# Patient Record
Sex: MALE | Race: WHITE | NOT HISPANIC OR LATINO | Employment: OTHER | URBAN - NONMETROPOLITAN AREA
[De-identification: names, ages, dates, MRNs, and addresses within clinical notes are randomized per-mention and may not be internally consistent; named-entity substitution may affect disease eponyms.]

---

## 2018-06-07 ENCOUNTER — HOSPITAL ENCOUNTER (INPATIENT)
Facility: HOSPITAL | Age: 68
LOS: 13 days | Discharge: HOME | End: 2018-06-20
Attending: PSYCHIATRY & NEUROLOGY | Admitting: PSYCHIATRY & NEUROLOGY

## 2018-06-07 ENCOUNTER — APPOINTMENT (OUTPATIENT)
Dept: CT IMAGING | Facility: HOSPITAL | Age: 68
End: 2018-06-07

## 2018-06-07 ENCOUNTER — HOSPITAL ENCOUNTER (EMERGENCY)
Facility: HOSPITAL | Age: 68
Discharge: PSYCHIATRIC OR SUBSTANCE USE FACILITY | End: 2018-06-07
Attending: EMERGENCY MEDICINE | Admitting: EMERGENCY MEDICINE

## 2018-06-07 ENCOUNTER — APPOINTMENT (OUTPATIENT)
Dept: GENERAL RADIOLOGY | Facility: HOSPITAL | Age: 68
End: 2018-06-07

## 2018-06-07 VITALS
BODY MASS INDEX: 28.49 KG/M2 | RESPIRATION RATE: 20 BRPM | HEART RATE: 63 BPM | DIASTOLIC BLOOD PRESSURE: 90 MMHG | HEIGHT: 68 IN | OXYGEN SATURATION: 96 % | SYSTOLIC BLOOD PRESSURE: 150 MMHG | TEMPERATURE: 98.1 F | WEIGHT: 188 LBS

## 2018-06-07 DIAGNOSIS — F29 PSYCHOSIS, UNSPECIFIED PSYCHOSIS TYPE (HCC): Primary | ICD-10-CM

## 2018-06-07 DIAGNOSIS — F32.3 SEVERE SINGLE CURRENT EPISODE OF MAJOR DEPRESSIVE DISORDER, WITH PSYCHOTIC FEATURES (HCC): Primary | ICD-10-CM

## 2018-06-07 PROBLEM — F32.9 MDD (MAJOR DEPRESSIVE DISORDER): Status: ACTIVE | Noted: 2018-06-07

## 2018-06-07 LAB
6-ACETYL MORPHINE: NEGATIVE
ALBUMIN SERPL-MCNC: 4.6 G/DL (ref 3.4–4.8)
ALBUMIN/GLOB SERPL: 1.5 G/DL (ref 1.5–2.5)
ALP SERPL-CCNC: 64 U/L (ref 40–129)
ALT SERPL W P-5'-P-CCNC: 25 U/L (ref 10–44)
AMPHET+METHAMPHET UR QL: NEGATIVE
ANION GAP SERPL CALCULATED.3IONS-SCNC: 5.9 MMOL/L (ref 3.6–11.2)
AST SERPL-CCNC: 24 U/L (ref 10–34)
BARBITURATES UR QL SCN: NEGATIVE
BASOPHILS # BLD AUTO: 0.09 10*3/MM3 (ref 0–0.3)
BASOPHILS NFR BLD AUTO: 0.9 % (ref 0–2)
BENZODIAZ UR QL SCN: POSITIVE
BILIRUB SERPL-MCNC: 1.2 MG/DL (ref 0.2–1.8)
BILIRUB UR QL STRIP: NEGATIVE
BUN BLD-MCNC: 16 MG/DL (ref 7–21)
BUN/CREAT SERPL: 13.2 (ref 7–25)
BUPRENORPHINE SERPL-MCNC: NEGATIVE NG/ML
CALCIUM SPEC-SCNC: 9.8 MG/DL (ref 7.7–10)
CANNABINOIDS SERPL QL: NEGATIVE
CHLORIDE SERPL-SCNC: 104 MMOL/L (ref 99–112)
CLARITY UR: CLEAR
CO2 SERPL-SCNC: 26.1 MMOL/L (ref 24.3–31.9)
COCAINE UR QL: NEGATIVE
COLOR UR: YELLOW
CREAT BLD-MCNC: 1.21 MG/DL (ref 0.43–1.29)
DEPRECATED RDW RBC AUTO: 43.3 FL (ref 37–54)
EOSINOPHIL # BLD AUTO: 0.19 10*3/MM3 (ref 0–0.7)
EOSINOPHIL NFR BLD AUTO: 2 % (ref 0–7)
ERYTHROCYTE [DISTWIDTH] IN BLOOD BY AUTOMATED COUNT: 13.5 % (ref 11.5–14.5)
ETHANOL BLD-MCNC: <10 MG/DL
ETHANOL UR QL: <0.01 %
GFR SERPL CREATININE-BSD FRML MDRD: 60 ML/MIN/1.73
GLOBULIN UR ELPH-MCNC: 3.1 GM/DL
GLUCOSE BLD-MCNC: 111 MG/DL (ref 70–110)
GLUCOSE UR STRIP-MCNC: NEGATIVE MG/DL
HCT VFR BLD AUTO: 45.2 % (ref 42–52)
HGB BLD-MCNC: 16.4 G/DL (ref 14–18)
HGB UR QL STRIP.AUTO: NEGATIVE
IMM GRANULOCYTES # BLD: 0.02 10*3/MM3 (ref 0–0.03)
IMM GRANULOCYTES NFR BLD: 0.2 % (ref 0–0.5)
KETONES UR QL STRIP: NEGATIVE
LEUKOCYTE ESTERASE UR QL STRIP.AUTO: NEGATIVE
LYMPHOCYTES # BLD AUTO: 1.74 10*3/MM3 (ref 1–3)
LYMPHOCYTES NFR BLD AUTO: 18 % (ref 16–46)
MCH RBC QN AUTO: 32.3 PG (ref 27–33)
MCHC RBC AUTO-ENTMCNC: 36.3 G/DL (ref 33–37)
MCV RBC AUTO: 89 FL (ref 80–94)
METHADONE UR QL SCN: NEGATIVE
MONOCYTES # BLD AUTO: 1.13 10*3/MM3 (ref 0.1–0.9)
MONOCYTES NFR BLD AUTO: 11.7 % (ref 0–12)
NEUTROPHILS # BLD AUTO: 6.5 10*3/MM3 (ref 1.4–6.5)
NEUTROPHILS NFR BLD AUTO: 67.2 % (ref 40–75)
NITRITE UR QL STRIP: NEGATIVE
OPIATES UR QL: NEGATIVE
OSMOLALITY SERPL CALC.SUM OF ELEC: 273.8 MOSM/KG (ref 273–305)
OXYCODONE UR QL SCN: NEGATIVE
PCP UR QL SCN: NEGATIVE
PH UR STRIP.AUTO: 6 [PH] (ref 5–8)
PLATELET # BLD AUTO: 360 10*3/MM3 (ref 130–400)
PMV BLD AUTO: 9.3 FL (ref 6–10)
POTASSIUM BLD-SCNC: 3.8 MMOL/L (ref 3.5–5.3)
PROT SERPL-MCNC: 7.7 G/DL (ref 6–8)
PROT UR QL STRIP: NEGATIVE
RBC # BLD AUTO: 5.08 10*6/MM3 (ref 4.7–6.1)
SODIUM BLD-SCNC: 136 MMOL/L (ref 135–153)
SP GR UR STRIP: 1.02 (ref 1–1.03)
UROBILINOGEN UR QL STRIP: NORMAL
WBC NRBC COR # BLD: 9.67 10*3/MM3 (ref 4.5–12.5)

## 2018-06-07 PROCEDURE — 80307 DRUG TEST PRSMV CHEM ANLYZR: CPT | Performed by: PHYSICIAN ASSISTANT

## 2018-06-07 PROCEDURE — 85025 COMPLETE CBC W/AUTO DIFF WBC: CPT | Performed by: PHYSICIAN ASSISTANT

## 2018-06-07 PROCEDURE — 70450 CT HEAD/BRAIN W/O DYE: CPT | Performed by: RADIOLOGY

## 2018-06-07 PROCEDURE — 80053 COMPREHEN METABOLIC PANEL: CPT | Performed by: PHYSICIAN ASSISTANT

## 2018-06-07 PROCEDURE — 99284 EMERGENCY DEPT VISIT MOD MDM: CPT

## 2018-06-07 PROCEDURE — 74022 RADEX COMPL AQT ABD SERIES: CPT

## 2018-06-07 PROCEDURE — 93010 ELECTROCARDIOGRAM REPORT: CPT | Performed by: INTERNAL MEDICINE

## 2018-06-07 PROCEDURE — 81003 URINALYSIS AUTO W/O SCOPE: CPT | Performed by: PHYSICIAN ASSISTANT

## 2018-06-07 PROCEDURE — 93005 ELECTROCARDIOGRAM TRACING: CPT | Performed by: PSYCHIATRY & NEUROLOGY

## 2018-06-07 PROCEDURE — 74022 RADEX COMPL AQT ABD SERIES: CPT | Performed by: RADIOLOGY

## 2018-06-07 PROCEDURE — 70450 CT HEAD/BRAIN W/O DYE: CPT

## 2018-06-07 RX ORDER — PAROXETINE HYDROCHLORIDE 20 MG/1
20 TABLET, FILM COATED ORAL DAILY
COMMUNITY
End: 2018-06-20 | Stop reason: HOSPADM

## 2018-06-07 RX ORDER — CLONIDINE HYDROCHLORIDE 0.1 MG/1
0.1 TABLET ORAL DAILY PRN
Status: DISCONTINUED | OUTPATIENT
Start: 2018-06-07 | End: 2018-06-09

## 2018-06-07 RX ORDER — OLANZAPINE 5 MG/1
5 TABLET, ORALLY DISINTEGRATING ORAL EVERY 8 HOURS PRN
Status: DISCONTINUED | OUTPATIENT
Start: 2018-06-07 | End: 2018-06-20 | Stop reason: HOSPADM

## 2018-06-07 RX ORDER — AMLODIPINE BESYLATE 10 MG/1
10 TABLET ORAL DAILY
Status: DISCONTINUED | OUTPATIENT
Start: 2018-06-08 | End: 2018-06-09

## 2018-06-07 RX ORDER — IRBESARTAN AND HYDROCHLOROTHIAZIDE 150; 12.5 MG/1; MG/1
2 TABLET, FILM COATED ORAL DAILY
COMMUNITY
End: 2018-06-20 | Stop reason: HOSPADM

## 2018-06-07 RX ORDER — BENZTROPINE MESYLATE 1 MG/1
1 TABLET ORAL DAILY PRN
Status: DISCONTINUED | OUTPATIENT
Start: 2018-06-07 | End: 2018-06-20 | Stop reason: HOSPADM

## 2018-06-07 RX ORDER — BENZONATATE 100 MG/1
100 CAPSULE ORAL 3 TIMES DAILY PRN
Status: DISCONTINUED | OUTPATIENT
Start: 2018-06-07 | End: 2018-06-20 | Stop reason: HOSPADM

## 2018-06-07 RX ORDER — ONDANSETRON 4 MG/1
4 TABLET, FILM COATED ORAL EVERY 6 HOURS PRN
Status: DISCONTINUED | OUTPATIENT
Start: 2018-06-07 | End: 2018-06-20 | Stop reason: HOSPADM

## 2018-06-07 RX ORDER — ECHINACEA PURPUREA EXTRACT 125 MG
2 TABLET ORAL AS NEEDED
Status: DISCONTINUED | OUTPATIENT
Start: 2018-06-07 | End: 2018-06-20 | Stop reason: HOSPADM

## 2018-06-07 RX ORDER — ALUMINA, MAGNESIA, AND SIMETHICONE 2400; 2400; 240 MG/30ML; MG/30ML; MG/30ML
15 SUSPENSION ORAL EVERY 6 HOURS PRN
Status: DISCONTINUED | OUTPATIENT
Start: 2018-06-07 | End: 2018-06-20 | Stop reason: HOSPADM

## 2018-06-07 RX ORDER — ATENOLOL 25 MG/1
25 TABLET ORAL DAILY
Status: DISCONTINUED | OUTPATIENT
Start: 2018-06-08 | End: 2018-06-12

## 2018-06-07 RX ORDER — BENZTROPINE MESYLATE 1 MG/ML
0.5 INJECTION INTRAMUSCULAR; INTRAVENOUS DAILY PRN
Status: DISCONTINUED | OUTPATIENT
Start: 2018-06-07 | End: 2018-06-20 | Stop reason: HOSPADM

## 2018-06-07 RX ORDER — CLONIDINE HYDROCHLORIDE 0.1 MG/1
0.1 TABLET ORAL DAILY PRN
COMMUNITY
End: 2018-06-20 | Stop reason: HOSPADM

## 2018-06-07 RX ORDER — ALPRAZOLAM 0.5 MG/1
0.5 TABLET ORAL 3 TIMES DAILY PRN
Status: DISCONTINUED | OUTPATIENT
Start: 2018-06-07 | End: 2018-06-20 | Stop reason: HOSPADM

## 2018-06-07 RX ORDER — TRAZODONE HYDROCHLORIDE 50 MG/1
50 TABLET ORAL NIGHTLY PRN
Status: DISCONTINUED | OUTPATIENT
Start: 2018-06-07 | End: 2018-06-20 | Stop reason: HOSPADM

## 2018-06-07 RX ORDER — ASPIRIN 81 MG/1
81 TABLET ORAL DAILY
COMMUNITY

## 2018-06-07 RX ORDER — AMLODIPINE BESYLATE 10 MG/1
10 TABLET ORAL DAILY
COMMUNITY
End: 2018-06-20 | Stop reason: HOSPADM

## 2018-06-07 RX ORDER — ALPRAZOLAM 0.5 MG/1
0.5 TABLET ORAL 3 TIMES DAILY PRN
COMMUNITY
End: 2018-06-20 | Stop reason: HOSPADM

## 2018-06-07 RX ORDER — ACETAMINOPHEN 325 MG/1
650 TABLET ORAL EVERY 4 HOURS PRN
Status: DISCONTINUED | OUTPATIENT
Start: 2018-06-07 | End: 2018-06-20 | Stop reason: HOSPADM

## 2018-06-07 RX ORDER — PANTOPRAZOLE SODIUM 40 MG/1
40 TABLET, DELAYED RELEASE ORAL EVERY EVENING
COMMUNITY
End: 2018-06-20 | Stop reason: HOSPADM

## 2018-06-07 RX ORDER — LOPERAMIDE HYDROCHLORIDE 2 MG/1
2 CAPSULE ORAL 4 TIMES DAILY PRN
Status: DISCONTINUED | OUTPATIENT
Start: 2018-06-07 | End: 2018-06-09

## 2018-06-07 RX ORDER — PAROXETINE HYDROCHLORIDE 20 MG/1
20 TABLET, FILM COATED ORAL EVERY MORNING
Status: CANCELLED | OUTPATIENT
Start: 2018-06-07

## 2018-06-07 RX ORDER — PANTOPRAZOLE SODIUM 40 MG/1
40 TABLET, DELAYED RELEASE ORAL EVERY EVENING
Status: DISCONTINUED | OUTPATIENT
Start: 2018-06-07 | End: 2018-06-20 | Stop reason: HOSPADM

## 2018-06-07 RX ORDER — PAROXETINE HYDROCHLORIDE 20 MG/1
20 TABLET, FILM COATED ORAL DAILY
Status: DISCONTINUED | OUTPATIENT
Start: 2018-06-07 | End: 2018-06-08

## 2018-06-07 RX ORDER — ASPIRIN 81 MG/1
81 TABLET, CHEWABLE ORAL DAILY
Status: DISCONTINUED | OUTPATIENT
Start: 2018-06-08 | End: 2018-06-20 | Stop reason: HOSPADM

## 2018-06-07 RX ORDER — FAMOTIDINE 20 MG/1
20 TABLET, FILM COATED ORAL 2 TIMES DAILY PRN
Status: DISCONTINUED | OUTPATIENT
Start: 2018-06-07 | End: 2018-06-20 | Stop reason: HOSPADM

## 2018-06-07 RX ORDER — ATENOLOL 25 MG/1
25 TABLET ORAL DAILY
COMMUNITY
End: 2018-06-20 | Stop reason: HOSPADM

## 2018-06-07 RX ADMIN — POLYETHYLENE GLYCOL (3350) 17 G: 17 POWDER, FOR SOLUTION ORAL at 12:42

## 2018-06-07 RX ADMIN — PANTOPRAZOLE SODIUM 40 MG: 40 TABLET, DELAYED RELEASE ORAL at 16:49

## 2018-06-07 RX ADMIN — PAROXETINE HYDROCHLORIDE 20 MG: 20 TABLET, FILM COATED ORAL at 12:42

## 2018-06-07 NOTE — PLAN OF CARE
Problem: Patient Care Overview  Goal: Plan of Care Review  Outcome: Ongoing (interventions implemented as appropriate)  NEW ADMIT. 6/7/18 06/07/18 2032   Coping/Psychosocial   Plan of Care Reviewed With patient   Coping/Psychosocial   Patient Agreement with Plan of Care agrees   Plan of Care Review   Progress no change       Problem: Overarching Goals (Adult)  Goal: Adheres to Safety Considerations for Self and Others  Outcome: Ongoing (interventions implemented as appropriate)    Goal: Optimized Coping Skills in Response to Life Stressors  Outcome: Ongoing (interventions implemented as appropriate)    Goal: Develops/Participates in Therapeutic [place] to Support Successful Transition  Outcome: Ongoing (interventions implemented as appropriate)

## 2018-06-07 NOTE — ED NOTES
Pt resting quietly on stretcher with no complaints.  Pt AAOx4 with no resp distress noted, respirations even and unlabored.  Pt denies any needs at this time.  Skin PWD.  Pt family at bedside. Will continue to monitor and follow plan of care.  Bed rails up x2, bed in lowest position, call light in reach.       Margarita Bustillos RN  06/07/18 2494
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
## 2018-06-07 NOTE — ED NOTES
"Pt reports that he has been feeling down and out and has been \"feeling tormented\".  Pt informs that he also has \"not been to the bathroom in the past couple weeks\".  Pt wife informs that he has been angry and agitated the past few months worsening the past couple days.  Pt wife at bedside.     Margarita Bustillos RN  06/07/18 3969    "

## 2018-06-08 PROBLEM — F32.3 MAJOR DEPRESSIVE DISORDER WITH PSYCHOTIC FEATURES: Status: ACTIVE | Noted: 2018-06-07

## 2018-06-08 PROBLEM — I10 HTN (HYPERTENSION): Status: ACTIVE | Noted: 2018-06-08

## 2018-06-08 PROBLEM — R45.851 DEPRESSION WITH SUICIDAL IDEATION: Status: ACTIVE | Noted: 2018-06-08

## 2018-06-08 PROBLEM — F32.A DEPRESSION WITH SUICIDAL IDEATION: Status: ACTIVE | Noted: 2018-06-08

## 2018-06-08 PROCEDURE — 99223 1ST HOSP IP/OBS HIGH 75: CPT | Performed by: PSYCHIATRY & NEUROLOGY

## 2018-06-08 RX ORDER — OLANZAPINE 5 MG/1
5 TABLET ORAL 2 TIMES DAILY
Status: DISCONTINUED | OUTPATIENT
Start: 2018-06-08 | End: 2018-06-15

## 2018-06-08 RX ORDER — FLUOXETINE 10 MG/1
10 CAPSULE ORAL DAILY
Status: DISCONTINUED | OUTPATIENT
Start: 2018-06-08 | End: 2018-06-11

## 2018-06-08 RX ADMIN — ASPIRIN 81 MG: 81 TABLET, CHEWABLE ORAL at 08:53

## 2018-06-08 RX ADMIN — OLANZAPINE 5 MG: 5 TABLET, FILM COATED ORAL at 11:57

## 2018-06-08 RX ADMIN — CLONIDINE HYDROCHLORIDE 0.1 MG: 0.1 TABLET ORAL at 20:04

## 2018-06-08 RX ADMIN — LOSARTAN POTASSIUM: 50 TABLET, FILM COATED ORAL at 08:54

## 2018-06-08 RX ADMIN — PANTOPRAZOLE SODIUM 40 MG: 40 TABLET, DELAYED RELEASE ORAL at 16:02

## 2018-06-08 RX ADMIN — PAROXETINE HYDROCHLORIDE 20 MG: 20 TABLET, FILM COATED ORAL at 08:54

## 2018-06-08 RX ADMIN — FLUOXETINE HYDROCHLORIDE 10 MG: 10 CAPSULE ORAL at 11:57

## 2018-06-08 RX ADMIN — ATENOLOL 25 MG: 25 TABLET ORAL at 08:54

## 2018-06-08 RX ADMIN — AMLODIPINE BESYLATE 10 MG: 10 TABLET ORAL at 08:54

## 2018-06-08 RX ADMIN — TRAZODONE HYDROCHLORIDE 50 MG: 50 TABLET ORAL at 00:52

## 2018-06-08 RX ADMIN — POLYETHYLENE GLYCOL (3350) 17 G: 17 POWDER, FOR SOLUTION ORAL at 08:53

## 2018-06-08 RX ADMIN — OLANZAPINE 5 MG: 5 TABLET, FILM COATED ORAL at 20:04

## 2018-06-08 NOTE — PLAN OF CARE
Problem: Patient Care Overview  Goal: Plan of Care Review  Outcome: Ongoing (interventions implemented as appropriate)   06/08/18 0258   Coping/Psychosocial   Plan of Care Reviewed With patient   Coping/Psychosocial   Patient Agreement with Plan of Care agrees   Plan of Care Review   Progress no change   OTHER   Outcome Summary Pt denies anxiety depression SI HI hallucinations.       Problem: Overarching Goals (Adult)  Goal: Adheres to Safety Considerations for Self and Others  Outcome: Ongoing (interventions implemented as appropriate)    Goal: Optimized Coping Skills in Response to Life Stressors  Outcome: Ongoing (interventions implemented as appropriate)    Goal: Develops/Participates in Therapeutic [place] to Support Successful Transition  Outcome: Ongoing (interventions implemented as appropriate)

## 2018-06-08 NOTE — PLAN OF CARE
Problem: Patient Care Overview  Goal: Plan of Care Review  Outcome: Ongoing (interventions implemented as appropriate)    Goal: Individualization and Mutuality  Outcome: Ongoing (interventions implemented as appropriate)    Goal: Discharge Needs Assessment  Outcome: Ongoing (interventions implemented as appropriate)    Goal: Interprofessional Rounds/Family Conf  Outcome: Ongoing (interventions implemented as appropriate)      Problem: Overarching Goals (Adult)  Goal: Adheres to Safety Considerations for Self and Others  Outcome: Ongoing (interventions implemented as appropriate)    Goal: Optimized Coping Skills in Response to Life Stressors  Outcome: Ongoing (interventions implemented as appropriate)    Goal: Develops/Participates in Therapeutic [place] to Support Successful Transition  Outcome: Ongoing (interventions implemented as appropriate)      Problem: Mood Impairment (Depressive Signs/Symptoms) (Adult)  Goal: Improved Mood Symptoms (Depressive Signs/Symptoms)  Outcome: Ongoing (interventions implemented as appropriate)      Problem: Mood Impairment (Anxiety Signs/Symptoms) (Adult)  Goal: Improved Mood Symptoms (Anxiety Signs/Symptoms)  Outcome: Ongoing (interventions implemented as appropriate)      Problem: Suicidal Behavior (Adult)  Goal: Suicidal Behavior is Absent/Minimized/Managed  Outcome: Ongoing (interventions implemented as appropriate)
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
## 2018-06-09 LAB
ANION GAP SERPL CALCULATED.3IONS-SCNC: 8.5 MMOL/L (ref 3.6–11.2)
BASOPHILS # BLD AUTO: 0.06 10*3/MM3 (ref 0–0.3)
BASOPHILS NFR BLD AUTO: 0.6 % (ref 0–2)
BUN BLD-MCNC: 21 MG/DL (ref 7–21)
BUN/CREAT SERPL: 15.3 (ref 7–25)
CALCIUM SPEC-SCNC: 9.9 MG/DL (ref 7.7–10)
CHLORIDE SERPL-SCNC: 102 MMOL/L (ref 99–112)
CHOLEST SERPL-MCNC: 178 MG/DL (ref 0–200)
CO2 SERPL-SCNC: 28.5 MMOL/L (ref 24.3–31.9)
CREAT BLD-MCNC: 1.37 MG/DL (ref 0.43–1.29)
DEPRECATED RDW RBC AUTO: 44.6 FL (ref 37–54)
EOSINOPHIL # BLD AUTO: 0.18 10*3/MM3 (ref 0–0.7)
EOSINOPHIL NFR BLD AUTO: 1.9 % (ref 0–7)
ERYTHROCYTE [DISTWIDTH] IN BLOOD BY AUTOMATED COUNT: 13.7 % (ref 11.5–14.5)
GFR SERPL CREATININE-BSD FRML MDRD: 52 ML/MIN/1.73
GLUCOSE BLD-MCNC: 129 MG/DL (ref 70–110)
HBA1C MFR BLD: 5 % (ref 4.5–5.7)
HCT VFR BLD AUTO: 43.2 % (ref 42–52)
HDLC SERPL-MCNC: 39 MG/DL (ref 60–100)
HGB BLD-MCNC: 15.4 G/DL (ref 14–18)
IMM GRANULOCYTES # BLD: 0.02 10*3/MM3 (ref 0–0.03)
IMM GRANULOCYTES NFR BLD: 0.2 % (ref 0–0.5)
LDLC SERPL CALC-MCNC: 115 MG/DL (ref 0–100)
LDLC/HDLC SERPL: 2.95 {RATIO}
LYMPHOCYTES # BLD AUTO: 2.54 10*3/MM3 (ref 1–3)
LYMPHOCYTES NFR BLD AUTO: 27.1 % (ref 16–46)
MCH RBC QN AUTO: 32.8 PG (ref 27–33)
MCHC RBC AUTO-ENTMCNC: 35.6 G/DL (ref 33–37)
MCV RBC AUTO: 92.1 FL (ref 80–94)
MONOCYTES # BLD AUTO: 1.3 10*3/MM3 (ref 0.1–0.9)
MONOCYTES NFR BLD AUTO: 13.9 % (ref 0–12)
NEUTROPHILS # BLD AUTO: 5.26 10*3/MM3 (ref 1.4–6.5)
NEUTROPHILS NFR BLD AUTO: 56.3 % (ref 40–75)
OSMOLALITY SERPL CALC.SUM OF ELEC: 282.2 MOSM/KG (ref 273–305)
PLATELET # BLD AUTO: 329 10*3/MM3 (ref 130–400)
PMV BLD AUTO: 9.7 FL (ref 6–10)
POTASSIUM BLD-SCNC: 3.8 MMOL/L (ref 3.5–5.3)
RBC # BLD AUTO: 4.69 10*6/MM3 (ref 4.7–6.1)
SODIUM BLD-SCNC: 139 MMOL/L (ref 135–153)
T4 FREE SERPL-MCNC: 1.36 NG/DL (ref 0.89–1.76)
TRIGL SERPL-MCNC: 119 MG/DL (ref 0–150)
TSH SERPL DL<=0.05 MIU/L-ACNC: 2.65 MIU/ML (ref 0.55–4.78)
VLDLC SERPL-MCNC: 23.8 MG/DL
WBC NRBC COR # BLD: 9.36 10*3/MM3 (ref 4.5–12.5)

## 2018-06-09 PROCEDURE — 84439 ASSAY OF FREE THYROXINE: CPT | Performed by: PSYCHIATRY & NEUROLOGY

## 2018-06-09 PROCEDURE — 85025 COMPLETE CBC W/AUTO DIFF WBC: CPT | Performed by: PHYSICIAN ASSISTANT

## 2018-06-09 PROCEDURE — 99233 SBSQ HOSP IP/OBS HIGH 50: CPT | Performed by: PSYCHIATRY & NEUROLOGY

## 2018-06-09 PROCEDURE — 80048 BASIC METABOLIC PNL TOTAL CA: CPT | Performed by: PSYCHIATRY & NEUROLOGY

## 2018-06-09 PROCEDURE — 84443 ASSAY THYROID STIM HORMONE: CPT | Performed by: PSYCHIATRY & NEUROLOGY

## 2018-06-09 PROCEDURE — 83036 HEMOGLOBIN GLYCOSYLATED A1C: CPT | Performed by: PSYCHIATRY & NEUROLOGY

## 2018-06-09 PROCEDURE — 99223 1ST HOSP IP/OBS HIGH 75: CPT | Performed by: HOSPITALIST

## 2018-06-09 PROCEDURE — 80061 LIPID PANEL: CPT | Performed by: PSYCHIATRY & NEUROLOGY

## 2018-06-09 RX ORDER — DOCUSATE SODIUM 100 MG/1
100 CAPSULE, LIQUID FILLED ORAL 2 TIMES DAILY
Status: DISCONTINUED | OUTPATIENT
Start: 2018-06-09 | End: 2018-06-10

## 2018-06-09 RX ADMIN — OLANZAPINE 5 MG: 5 TABLET, FILM COATED ORAL at 08:47

## 2018-06-09 RX ADMIN — SODIUM CHLORIDE 500 ML: 9 INJECTION, SOLUTION INTRAVENOUS at 16:40

## 2018-06-09 RX ADMIN — POLYETHYLENE GLYCOL (3350) 17 G: 17 POWDER, FOR SOLUTION ORAL at 08:47

## 2018-06-09 RX ADMIN — OLANZAPINE 5 MG: 5 TABLET, FILM COATED ORAL at 21:41

## 2018-06-09 RX ADMIN — SODIUM CHLORIDE 500 ML: 9 INJECTION, SOLUTION INTRAVENOUS at 12:49

## 2018-06-09 RX ADMIN — SODIUM CHLORIDE 500 ML: 9 INJECTION, SOLUTION INTRAVENOUS at 14:34

## 2018-06-09 RX ADMIN — FLUOXETINE HYDROCHLORIDE 10 MG: 10 CAPSULE ORAL at 08:47

## 2018-06-09 RX ADMIN — DOCUSATE SODIUM 100 MG: 100 CAPSULE, LIQUID FILLED ORAL at 21:41

## 2018-06-09 RX ADMIN — AMLODIPINE BESYLATE 10 MG: 10 TABLET ORAL at 08:47

## 2018-06-09 RX ADMIN — LOSARTAN POTASSIUM: 50 TABLET, FILM COATED ORAL at 08:46

## 2018-06-09 RX ADMIN — ATENOLOL 25 MG: 25 TABLET ORAL at 08:47

## 2018-06-09 RX ADMIN — ASPIRIN 81 MG: 81 TABLET, CHEWABLE ORAL at 08:46

## 2018-06-09 RX ADMIN — PANTOPRAZOLE SODIUM 40 MG: 40 TABLET, DELAYED RELEASE ORAL at 17:14

## 2018-06-09 NOTE — PROGRESS NOTES
"      Inpatient Psy Progress Note   Clinician: Tej De Leon MD  Admission Date: 6/7/2018  1:38 PM 06/09/18    Behavioral Health Treatment Plan and Problem List: I have reviewed and approved the Behavioral Health Treatment Plan and Problem list.    Allergies  No Known Allergies    Hospital Day: 2 days      Assessment completed within view of staff    History  CC: inpatient followup  Interval HPI: Patient seen and evaluated by me.  Chart reviewed. His blood pressure has been extremely low this morning.  Hospitalist consulted and patient currently receiving IV fluids.    Patient rates  level of depression (subjectively) at a   6/10.  Anxiety   8/10.  Patient tolerating meds with exception of dry mouth.        Interval Review of Systems:   General ROS: negative for - fever or malaise + for dizziness and syncope last night and this morning.  Endocrine ROS: negative for - palpitations  Respiratory ROS: no cough, shortness of breath, or wheezing  Cardiovascular ROS: no chest pain or dyspnea on exertion  Gastrointestinal ROS: no abdominal pain,no black or bloody stools    BP (!) 89/46 (Patient Position: Sitting)   Pulse 65   Temp 99.3 °F (37.4 °C) (Temporal Artery )   Resp 18   Ht 172.7 cm (68\")   Wt 80.5 kg (177 lb 6.4 oz)   SpO2 96%   BMI 26.97 kg/m²     Mental Status Exam  Mood: depressed  Affect: dysphoric   Thought Processes: linear, logical, and goal directed  Thought Content: negativistic  Hallucinations: no  Suicidal Thoughts: denies  Suicidal Plan/Intent:denies  Hopelesness:Moderate  Homicidal Thoughts:  absent      Medical Decision Making:   Labs:     Lab Results (last 24 hours)     Procedure Component Value Units Date/Time    T4, Free [[id number]]  (Normal) Collected:  06/09/18 0757    Specimen:  Blood Updated:  06/09/18 0945     Free T4 1.36 ng/dL     Hemoglobin A1c [[id number]]  (Normal) Collected:  06/09/18 0757    Specimen:  Blood Updated:  06/09/18 0905     Hemoglobin A1C 5.00 %     TSH [[id number]]  " (Normal) Collected:  06/09/18 0757    Specimen:  Blood Updated:  06/09/18 0901     TSH 2.651 mIU/mL     Lipid Panel [[id number]]  (Abnormal) Collected:  06/09/18 0757    Specimen:  Blood Updated:  06/09/18 0852     Total Cholesterol 178 mg/dL      Triglycerides 119 mg/dL      HDL Cholesterol 39 (L) mg/dL      LDL Cholesterol  115 (H) mg/dL      VLDL Cholesterol 23.8 mg/dL      LDL/HDL Ratio 2.95    Narrative:       Cholesterol Reference Ranges  (U.S. Department of Health and Human Services ATP III Classifications)    Desirable          <200 mg/dL  Borderline High    200-239 mg/dL  High Risk          >240 mg/dL      Triglyceride Reference Ranges  (U.S. Department of Health and Human Services ATP III Classifications)    Normal           <150 mg/dL  Borderline High  150-199 mg/dL  High             200-499 mg/dL  Very High        >500 mg/dL    HDL Reference Ranges  (U.S. Department of Health and Human Services ATP III Classifcations)    Low     <40 mg/dl (major risk factor for CHD)  High    >60 mg/dl ('negative' risk factor for CHD)        LDL Reference Ranges  (U.S. Department of Health and Human Services ATP III Classifcations)    Optimal          <100 mg/dL  Near Optimal     100-129 mg/dL  Borderline High  130-159 mg/dL  High             160-189 mg/dL  Very High        >189 mg/dL    Basic Metabolic Panel [[id number]]  (Abnormal) Collected:  06/09/18 0757    Specimen:  Blood Updated:  06/09/18 0852     Glucose 129 (H) mg/dL      BUN 21 mg/dL      Creatinine 1.37 (H) mg/dL      Sodium 139 mmol/L      Potassium 3.8 mmol/L      Chloride 102 mmol/L      CO2 28.5 mmol/L      Calcium 9.9 mg/dL      eGFR Non African Amer 52 (L) mL/min/1.73      BUN/Creatinine Ratio 15.3     Anion Gap 8.5 mmol/L     Narrative:       GFR Normal >60  Chronic Kidney Disease <60  Kidney Failure <15    Osmolality, Calculated [[id number]]  (Normal) Collected:  06/09/18 0757    Specimen:  Blood Updated:  06/09/18 0852     Osmolality Calc 282.2 mOsm/kg              Radiology:     Imaging Results (last 24 hours)     ** No results found for the last 24 hours. **            EKG:     ECG/EMG Results (most recent)     Procedure Component Value Units Date/Time    ECG 12 Lead [[id number]] Collected:  06/07/18 1443     Updated:  06/07/18 2023    Narrative:       Test Reason : Potential adverse reaction to medications.  Blood Pressure : **/** mmHG  Vent. Rate : 056 BPM     Atrial Rate : 056 BPM     P-R Int : 150 ms          QRS Dur : 106 ms      QT Int : 412 ms       P-R-T Axes : 064 057 048 degrees     QTc Int : 397 ms    Sinus bradycardia  Otherwise normal ECG  No previous ECGs available  Confirmed by Thai Burnham ([id number]) on 6/7/2018 8:23:37 PM    Referred By:  SARAH           Confirmed By:Thai Burnham           Medications:     amLODIPine 10 mg Oral Daily   aspirin 81 mg Oral Daily   atenolol 25 mg Oral Daily   FLUoxetine 10 mg Oral Daily   OLANZapine 5 mg Oral BID   pantoprazole 40 mg Oral Q PM   polyethylene glycol 17 g Oral Daily          All medications reviewed.      Assessment and Plan:   Diagnosis Code   • Major depressive disorder with psychotic features  Plan: labs reviewed.  Continue prozac and zyprexa.  F32.3    • Depression with suicidal ideation   Plan: is continued on precautions.  F32.9, R45.851   • HTN (hypertension)   Plan: continues patient current medication, Norvasc and atenolol and evaluate prospectively for necessary changes.   I10        Hypotension    - hospitalist consulted.  Patient currently receiving IV fluids.    Continue hospitalization for safety and stabilization.  Continue current level of Special Precautions (q15 minute checks).

## 2018-06-09 NOTE — PLAN OF CARE
"Problem: Patient Care Overview  Goal: Plan of Care Review  Outcome: Ongoing (interventions implemented as appropriate)   06/09/18 0153   Coping/Psychosocial   Plan of Care Reviewed With patient   Coping/Psychosocial   Patient Agreement with Plan of Care agrees   Plan of Care Review   Progress no change   OTHER   Outcome Summary Pt denies anxiety depression SI HI hallucinations. Pt did not sleep any the previous night and has not slept tonight. When PRN medication was offered pt states \"I know if I lay down I will die\".        Problem: Overarching Goals (Adult)  Goal: Adheres to Safety Considerations for Self and Others  Outcome: Ongoing (interventions implemented as appropriate)    Goal: Optimized Coping Skills in Response to Life Stressors  Outcome: Ongoing (interventions implemented as appropriate)    Goal: Develops/Participates in Therapeutic [place] to Support Successful Transition  Outcome: Ongoing (interventions implemented as appropriate)        "

## 2018-06-09 NOTE — PLAN OF CARE
Problem: Patient Care Overview  Goal: Plan of Care Review  Outcome: Ongoing (interventions implemented as appropriate)    Goal: Individualization and Mutuality  Outcome: Ongoing (interventions implemented as appropriate)    Goal: Discharge Needs Assessment  Outcome: Ongoing (interventions implemented as appropriate)    Goal: Interprofessional Rounds/Family Conf  Outcome: Ongoing (interventions implemented as appropriate)      Problem: Overarching Goals (Adult)  Goal: Adheres to Safety Considerations for Self and Others  Outcome: Ongoing (interventions implemented as appropriate)    Goal: Optimized Coping Skills in Response to Life Stressors  Outcome: Ongoing (interventions implemented as appropriate)    Goal: Develops/Participates in Therapeutic [place] to Support Successful Transition  Outcome: Ongoing (interventions implemented as appropriate)      Problem: Mood Impairment (Depressive Signs/Symptoms) (Adult)  Goal: Improved Mood Symptoms (Depressive Signs/Symptoms)  Outcome: Ongoing (interventions implemented as appropriate)      Problem: Mood Impairment (Anxiety Signs/Symptoms) (Adult)  Goal: Improved Mood Symptoms (Anxiety Signs/Symptoms)  Outcome: Ongoing (interventions implemented as appropriate)      Problem: Suicidal Behavior (Adult)  Goal: Suicidal Behavior is Absent/Minimized/Managed  Outcome: Ongoing (interventions implemented as appropriate)      Problem: Fall Risk (Adult)  Goal: Identify Related Risk Factors and Signs and Symptoms  Outcome: Ongoing (interventions implemented as appropriate)    Goal: Absence of Fall  Outcome: Ongoing (interventions implemented as appropriate)

## 2018-06-09 NOTE — CONSULTS
[facility] HOSPITALIST CONSULT NOTE     Inpatient Hospitalist Consult  Consult performed by: DARLINE GREGG  Consult ordered by: JERONIMO BRIDGES          Patient Identification:  Name:  Ernesto Easley  Age:  67 y.o.  Sex:  male  :  1950  MRN:  [id number]  Visit Number:  [id number]  Primary care provider:  GLO Huston    Length of stay:  2    Subjective     Chief Complaint:  Weak     History of presenting illness:     Patient is a 68 yo male admitted to the [facility] per psychiatry on 2018 for major depression and psychosis episode. Hospitalist services were consulted for low blood pressure. Patient's past medical history is significant for essential hypertension treated with 10 mg Norvasc daily, 25 mg atenolol daily, and 150-12.5 irbesartan-hydrochlorathiazide two tablets daily.     Patient states that until this morning when he was getting up to take a shower, he was feeling fine. He states that once he got up out of bed to go shower, he felt very weak, dizzy, and light headed. Patient was given all of his home blood pressure medication this morning, his BP prior to medication administration was 127/79. Per report, patient has not been eating or drinking much since time of admission. Currently, patient continues to report weakness. He denies any chest pain or shortness of breath. He states he feels okay if he remains lying down, becomes symptomatic with standing. He does report decreased po intake the past two days, but reports increased intake today. He is alert and oriented, has no other complaints. Denies palpitations. He denies any syncope. Denies abdominal pain, nausea, vomiting, diarrhea. No fevers or chills. No urinary symptoms.     Present during exam: BERNIE Colón  ---------------------------------------------------------------------------------------------------------------------  Review of Systems   Constitutional: Positive for appetite  change and fatigue. Negative for activity change, chills, diaphoresis and fever.   HENT: Negative for congestion, postnasal drip, rhinorrhea, sinus pain, sinus pressure, sneezing and sore throat.    Eyes: Negative for discharge and visual disturbance.   Respiratory: Negative for cough, chest tightness, shortness of breath and wheezing.    Cardiovascular: Negative for chest pain, palpitations and leg swelling.   Endocrine: Negative for cold intolerance and heat intolerance.   Genitourinary: Negative for dysuria, flank pain, frequency and urgency.   Musculoskeletal: Negative for arthralgias and myalgias.   Skin: Negative for pallor, rash and wound.   Allergic/Immunologic: Negative for environmental allergies and immunocompromised state.   Neurological: Positive for dizziness, weakness and light-headedness. Negative for syncope.   Hematological: Negative for adenopathy. Does not bruise/bleed easily.   Psychiatric/Behavioral: Negative for confusion and decreased concentration. The patient is not nervous/anxious.       ---------------------------------------------------------------------------------------------------------------------   Past History:  Past Medical History:   Diagnosis Date   • Anxiety    • Depression    • Hernia of abdominal cavity    • HTN (hypertension)    • Pyloric ulcer      Past Surgical History:   Procedure Laterality Date   • TOE NAIL AVULSION       Family History   Problem Relation Age of Onset   • Dementia Sister      Social History     Social History   • Marital status:      Social History Main Topics   • Smoking status: Never Smoker   • Smokeless tobacco: Never Used   • Alcohol use No      Comment: denies   • Drug use: No      Comment: denies   • Sexual activity: No     Other Topics Concern   • Not on file     ---------------------------------------------------------------------------------------------------------------------   Allergies:  Patient has no known  allergies.  ---------------------------------------------------------------------------------------------------------------------   Prior to Admission Medications     Prescriptions Last Dose Informant Patient Reported? Taking?    ALPRAZolam (XANAX) 0.5 MG tablet 6/6/2018 Medication Bottle Yes Yes    Take 0.5 mg by mouth 3 (Three) Times a Day As Needed for Anxiety.    amLODIPine (NORVASC) 10 MG tablet 6/7/2018 Medication Bottle Yes Yes    Take 10 mg by mouth Daily.    aspirin 81 MG chewable tablet 6/7/2018 Medication Bottle Yes Yes    Chew 81 mg Daily.    atenolol (TENORMIN) 25 MG tablet 6/7/2018 Medication Bottle Yes Yes    Take 25 mg by mouth Daily.    CloNIDine (CATAPRES) 0.1 MG tablet Unknown Medication Bottle Yes No    Take 0.1 mg by mouth Daily As Needed for High Blood Pressure (only give if its over 150/90).    irbesartan-hydrochlorothiazide (AVALIDE) 150-12.5 MG tablet 6/7/2018 Medication Bottle Yes Yes    Take 2 tablets by mouth Daily.    pantoprazole (PROTONIX) 40 MG EC tablet 6/6/2018 Medication Bottle Yes Yes    Take 40 mg by mouth Every Evening.    PARoxetine (PAXIL) 20 MG tablet 6/6/2018 Medication Bottle Yes Yes    Take 20 mg by mouth Daily.        ---------------------------------------------------------------------------------------------------------------------     Objective      [facility] Meds:    amLODIPine 10 mg Oral Daily   aspirin 81 mg Oral Daily   atenolol 25 mg Oral Daily   FLUoxetine 10 mg Oral Daily   OLANZapine 5 mg Oral BID   pantoprazole 40 mg Oral Q PM   polyethylene glycol 17 g Oral Daily   sodium chloride 500 mL Intravenous Once        ---------------------------------------------------------------------------------------------------------------------   Vital Signs:  Temp:  [98.2 °F (36.8 °C)-99.3 °F (37.4 °C)] 99.3 °F (37.4 °C)  Heart Rate:  [61-80] 72  Resp:  [18] 18  BP: ()/() 82/56  No data found.    SpO2 Percentage    06/08/18 1955 06/08/18 2034 06/09/18 0800   SpO2:  98% 98% 96%     SpO2:  [96 %-98 %] 96 %  on   ;   Device (Oxygen Therapy): room air    Body mass index is 26.97 kg/m².  Wt Readings from Last 3 Encounters:   06/07/18 80.5 kg (177 lb 6.4 oz)   06/07/18 85.3 kg (188 lb)      No intake or output data in the 24 hours ending 06/09/18 1247  Diet Regular  ---------------------------------------------------------------------------------------------------------------------   Physical exam:  Constitutional:  Well-developed and well-nourished.  No respiratory distress.      HENT:  Head: Normocephalic and atraumatic.  Mouth:  Moist mucous membranes.    Eyes:  Conjunctivae and EOM are normal.  Pupils are equal, round, and reactive to light.  No scleral icterus.    Neck:  Neck supple.  No JVD present.    Cardiovascular:  Normal rate, regular rhythm and normal heart sounds with no murmur.  Pulmonary/Chest:  No respiratory distress, no wheezes, no crackles, with normal breath sounds and good air movement.  Abdominal:  Soft.  Bowel sounds are normal.  No distension and no tenderness.   Musculoskeletal:  No edema, no tenderness, and no deformity.  No red or swollen joints anywhere.    Neurological:  Alert and oriented to person, place, and time.  No cranial nerve deficit.  No tongue deviation.  No facial droop.  No slurred speech.   Skin:  Skin is warm and dry. No rash noted.  No pallor.   Peripheral vascular: No edema. Capillary refill < 3 seconds.   Psychiatric:  Normal mood and affect.  Behavior is normal.  Judgment and thought content normal.   Genitourinary: No huffman catheter in place, making urine.   ---------------------------------------------------------------------------------------------------------------------   EKG:       Telemetry:  Patient is not currently on telemetry.     I have personally reviewed the EKG/Telemetry strips.   ---------------------------------------------------------------------------------------------------------------------         Results from  last 7 days  Lab Units 06/09/18  0757   CHOLESTEROL mg/dL 178   TRIGLYCERIDES mg/dL 119   HDL CHOL mg/dL 39*   LDL CHOL mg/dL 115*       Results from last 7 days  Lab Units 06/07/18  0843   WBC 10*3/mm3 9.67   HEMOGLOBIN g/dL 16.4   HEMATOCRIT % 45.2   MCV fL 89.0   MCHC g/dL 36.3   PLATELETS 10*3/mm3 360     Results from last 7 days  Lab Units 06/09/18  0757 06/07/18  0843   SODIUM mmol/L 139 136   POTASSIUM mmol/L 3.8 3.8   CHLORIDE mmol/L 102 104   CO2 mmol/L 28.5 26.1   BUN mg/dL 21 16   CREATININE mg/dL 1.37* 1.21   EGFR IF NONAFRICN AM mL/min/1.73 52* 60*   CALCIUM mg/dL 9.9 9.8   GLUCOSE mg/dL 129* 111*   ALBUMIN g/dL  --  4.60   BILIRUBIN mg/dL  --  1.2   ALK PHOS U/L  --  64   AST (SGOT) U/L  --  24   ALT (SGPT) U/L  --  25   Estimated Creatinine Clearance: 61.2 mL/min (A) (by C-G formula based on SCr of 1.37 mg/dL (H)).  No results found for: AMMONIA    Hemoglobin A1C   Date/Time Value Ref Range Status   06/09/2018 0757 5.00 4.50 - 5.70 % Final     Lab Results   Component Value Date    HGBA1C 5.00 06/09/2018     Lab Results   Component Value Date    TSH 2.651 06/09/2018    FREET4 1.36 06/09/2018     Pain Management Panel     Pain Management Panel Latest Ref Rng & Units 6/7/2018    AMPHETAMINES SCREEN, URINE Negative Negative    BARBITURATES SCREEN Negative Negative    BENZODIAZEPINE SCREEN, URINE Negative Positive(A)    BUPRENORPHINE Negative Negative    COCAINE SCREEN, URINE Negative Negative    METHADONE SCREEN, URINE Negative Negative        I have personally reviewed the above laboratory results.   ---------------------------------------------------------------------------------------------------------------------  Imaging Results (last 7 days)               I have personally reviewed the above radiology results.   ----------------------------------------------------------------------------------------------------------------------    Assessment/Plan     -Hypotension, most likely medication induced    -Mild acute on chronic stage III renal failure   -GERD   -Constipation with possible ileus on abdominal films.  -Major depression with psychosis   -Anxiety     Continue psychiatric care per primary. Patient hypotensive, most likely secondary to medications and decreased po intake. BP was low normal this morning and three large dose oral BP medications were given. Patient was hypertensive during the night and was given a PRN dose of 0.1 mg PO clonidine. Suspect hypotension due to combo of meds and since he has not been drinking much fluids. I have ordered for STAT normal saline bolus. Will stop home irbesartan-hydrochlorothiazide, in setting of mild renal failure. Will put holding parameters on Atenolol and Norvasc. Will stop PRN clonidine. Will reassess BP after these interventions are made. Continue to closely monitor BP, adjust medication therapy as necessary. Patient with no signs of infection to warrant sepsis work up, he has no symptoms there than symptomatic hypotension.  Patient does have constipation, ? Ileus on left abdomen on abdominal films, schedule miralax and colace, again patient asymptomatic.       Thank you for the consult, Hospitalist Services will continue to follow.     MARY Salas  06/09/18  12:47 PM  ---------------------------------------------------------------------------------------------------------------------

## 2018-06-10 LAB
ANION GAP SERPL CALCULATED.3IONS-SCNC: 6.1 MMOL/L (ref 3.6–11.2)
BUN BLD-MCNC: 27 MG/DL (ref 7–21)
BUN/CREAT SERPL: 18.5 (ref 7–25)
CALCIUM SPEC-SCNC: 9 MG/DL (ref 7.7–10)
CHLORIDE SERPL-SCNC: 106 MMOL/L (ref 99–112)
CO2 SERPL-SCNC: 27.9 MMOL/L (ref 24.3–31.9)
CREAT BLD-MCNC: 1.46 MG/DL (ref 0.43–1.29)
GFR SERPL CREATININE-BSD FRML MDRD: 48 ML/MIN/1.73
GLUCOSE BLD-MCNC: 97 MG/DL (ref 70–110)
OSMOLALITY SERPL CALC.SUM OF ELEC: 284.4 MOSM/KG (ref 273–305)
POTASSIUM BLD-SCNC: 4 MMOL/L (ref 3.5–5.3)
SODIUM BLD-SCNC: 140 MMOL/L (ref 135–153)

## 2018-06-10 PROCEDURE — 99232 SBSQ HOSP IP/OBS MODERATE 35: CPT | Performed by: HOSPITALIST

## 2018-06-10 PROCEDURE — 80048 BASIC METABOLIC PNL TOTAL CA: CPT | Performed by: HOSPITALIST

## 2018-06-10 PROCEDURE — 99232 SBSQ HOSP IP/OBS MODERATE 35: CPT | Performed by: PSYCHIATRY & NEUROLOGY

## 2018-06-10 RX ORDER — SENNA AND DOCUSATE SODIUM 50; 8.6 MG/1; MG/1
2 TABLET, FILM COATED ORAL NIGHTLY
Status: DISCONTINUED | OUTPATIENT
Start: 2018-06-10 | End: 2018-06-11

## 2018-06-10 RX ORDER — AMLODIPINE BESYLATE 5 MG/1
5 TABLET ORAL
Status: DISCONTINUED | OUTPATIENT
Start: 2018-06-10 | End: 2018-06-11

## 2018-06-10 RX ORDER — BISACODYL 10 MG
10 SUPPOSITORY, RECTAL RECTAL DAILY
Status: DISCONTINUED | OUTPATIENT
Start: 2018-06-10 | End: 2018-06-20 | Stop reason: HOSPADM

## 2018-06-10 RX ADMIN — DOCUSATE SODIUM AND SENNOSIDES 2 TABLET: 8.6; 5 TABLET, FILM COATED ORAL at 20:41

## 2018-06-10 RX ADMIN — DOCUSATE SODIUM 100 MG: 100 CAPSULE, LIQUID FILLED ORAL at 10:03

## 2018-06-10 RX ADMIN — AMLODIPINE BESYLATE 5 MG: 5 TABLET ORAL at 18:29

## 2018-06-10 RX ADMIN — ASPIRIN 81 MG: 81 TABLET, CHEWABLE ORAL at 10:03

## 2018-06-10 RX ADMIN — FLUOXETINE HYDROCHLORIDE 10 MG: 10 CAPSULE ORAL at 10:03

## 2018-06-10 RX ADMIN — ATENOLOL 25 MG: 25 TABLET ORAL at 10:03

## 2018-06-10 RX ADMIN — POLYETHYLENE GLYCOL (3350) 17 G: 17 POWDER, FOR SOLUTION ORAL at 10:03

## 2018-06-10 RX ADMIN — OLANZAPINE 5 MG: 5 TABLET, FILM COATED ORAL at 20:41

## 2018-06-10 RX ADMIN — PANTOPRAZOLE SODIUM 40 MG: 40 TABLET, DELAYED RELEASE ORAL at 16:25

## 2018-06-10 RX ADMIN — OLANZAPINE 5 MG: 5 TABLET, FILM COATED ORAL at 10:03

## 2018-06-10 NOTE — PROGRESS NOTES
"    [facility]     Patient Identification:  Name:  Ernesto Easley  Age:  67 y.o.  Sex:  male  :  1950  MRN:  [id number]  Visit Number:  [id number]  Primary Care Provider:  GLO Huston    Length of stay:  3    Subjective:  Patient is awake, comfortable, he has very poor appetite and has reported that he has not eaten for a while, he reports no known pain no dyspepsia or nausea or vomiting, he just don't \"have the appetite \".  His last bowel movement was 2 days ago which she reported as hard and constipated.  He received 2 L of fluids yesterday for hypotension.  His blood pressure is now elevated but his renal function has not improved.  He reports his been urinating.  ----------------------------------------------------------------------------------------------------------------------  Current Hospital Meds:    amLODIPine 5 mg Oral Q24H   aspirin 81 mg Oral Daily   atenolol 25 mg Oral Daily   FLUoxetine 10 mg Oral Daily   OLANZapine 5 mg Oral BID   pantoprazole 40 mg Oral Q PM   polyethylene glycol 17 g Oral Daily   sennosides-docusate sodium 2 tablet Oral Nightly        ----------------------------------------------------------------------------------------------------------------------  Vital Signs:  Temp:  [98.1 °F (36.7 °C)-99.4 °F (37.4 °C)] 98.1 °F (36.7 °C)  Heart Rate:  [53-79] 61  Resp:  [18] 18  BP: ()/() 160/107       Tele:   1    18  1040   Weight: 80.5 kg (177 lb 6.4 oz)     Body mass index is 26.97 kg/m².  No intake or output data in the 24 hours ending 06/10/18 1711  Diet Regular  ----------------------------------------------------------------------------------------------------------------------  Physical exam:  General: Pleasant, responds appropriately very blunt affect. Comfortable,awake, alert, oriented to self, place, and time, well-developed and well-nourished.  No respiratory distress.    Skin:  Skin is warm and dry. No rash " noted. No pallor.  Poor turgor  HENT:  Head:  Normocephalic and atraumatic.  Mouth:  Moist mucous membranes.    Eyes:  Conjunctivae and EOM are normal.  Pupils are equal, round, and reactive to light.  No scleral icterus.    Neck:  Neck supple.  No JVD present.    Pulmonary/Chest:  No respiratory distress, no wheezes, no crackles, with normal breath sounds and good air movement.  Cardiovascular:  Normal rate, regular rhythm and normal heart sounds with no murmur.  Abdominal:  Soft.  Bowel sounds are normal.  No distension and no tenderness.   Extremities:  No edema, no tenderness, and no deformity.  No red or swollen joints anywhere.  Strong pulses in all 4 extremities with no clubbing, no cyanosis, no edema.  Neurological:  Motor strength equal no obvious deficit, sensory grossly intact.   No cranial nerve deficit.  No tongue deviation.  No facial droop.  No slurred speech.      ----------------------------------------------------------------------------------------------------------------------  ----------------------------------------------------------------------------------------------------------------------        Results from last 7 days  Lab Units 06/09/18  1355 06/07/18  0843   WBC 10*3/mm3 9.36 9.67   HEMOGLOBIN g/dL 15.4 16.4   HEMATOCRIT % 43.2 45.2   MCV fL 92.1 89.0   MCHC g/dL 35.6 36.3   PLATELETS 10*3/mm3 329 360           Results from last 7 days  Lab Units 06/10/18  0506 06/09/18  0757 06/07/18  0843   SODIUM mmol/L 140 139 136   POTASSIUM mmol/L 4.0 3.8 3.8   CHLORIDE mmol/L 106 102 104   CO2 mmol/L 27.9 28.5 26.1   BUN mg/dL 27* 21 16   CREATININE mg/dL 1.46* 1.37* 1.21   EGFR IF NONAFRICN AM mL/min/1.73 48* 52* 60*   CALCIUM mg/dL 9.0 9.9 9.8   GLUCOSE mg/dL 97 129* 111*   ALBUMIN g/dL  --   --  4.60   BILIRUBIN mg/dL  --   --  1.2   ALK PHOS U/L  --   --  64   AST (SGOT) U/L  --   --  24   ALT (SGPT) U/L  --   --  25   Estimated Creatinine Clearance: 57.4 mL/min (A) (by C-G formula based on  SCr of 1.46 mg/dL (H)).    No results found for: AMMONIA    Results from last 7 days  Lab Units 06/09/18  0757   CHOLESTEROL mg/dL 178   TRIGLYCERIDES mg/dL 119   HDL CHOL mg/dL 39*   LDL CHOL mg/dL 115*                   I have personally looked at the labs and they are summarized above.  ----------------------------------------------------------------------------------------------------------------------  Imaging Results (last 24 hours)     ** No results found for the last 24 hours. **        ----------------------------------------------------------------------------------------------------------------------  Assessment and Plan:  - Hypotension secondary to medication as well as dehydration.  - Dehydration secondary to poor intake and diuretics  - Acute kidney injury prerenal  - Major depression with psychosis    Patient is encouraged to increase fluid intake, else he will require IV fluids.  We will suggest continued holding ARB's and diuretics for now.  May restart Norvasc at the lower dose for his blood pressure, address constipation, repeat BMP in the morning.  I suggested to the nurse to keep hep lock for now in case he needs IV fluids.  We will follow the patient with you closely.       Beatris Velazquez MD  06/10/18  5:11 PM

## 2018-06-10 NOTE — PLAN OF CARE
Problem: Patient Care Overview  Goal: Plan of Care Review  Outcome: Ongoing (interventions implemented as appropriate)  Denies suicidal thoughts. Denies hallucinations. Asked patient to rate anxiety and depression no answer to question. Patient observed to be withdrawn staying in room. Patient reports does not want anything to eat or drink encouraged patient to eat and drink refuses at this time. Patients blood pressure elevated on last assessment patient refuses to have blood pressure rechecked. Dr De Leon notified of elevated blood pressure.   Goal: Individualization and Mutuality  Outcome: Ongoing (interventions implemented as appropriate)    Goal: Discharge Needs Assessment  Outcome: Ongoing (interventions implemented as appropriate)      Problem: Mood Impairment (Anxiety Signs/Symptoms) (Adult)  Goal: Improved Mood Symptoms (Anxiety Signs/Symptoms)  Outcome: Ongoing (interventions implemented as appropriate)      Problem: Suicidal Behavior (Adult)  Goal: Suicidal Behavior is Absent/Minimized/Managed  Outcome: Ongoing (interventions implemented as appropriate)      Problem: Fall Risk (Adult)  Goal: Identify Related Risk Factors and Signs and Symptoms  Outcome: Ongoing (interventions implemented as appropriate)    Goal: Absence of Fall  Outcome: Ongoing (interventions implemented as appropriate)

## 2018-06-10 NOTE — PROGRESS NOTES
"      Inpatient Psy Progress Note   Clinician: Tej De Leon MD  Admission Date: 6/7/2018  12:18 PM 06/10/18    Behavioral Health Treatment Plan and Problem List: I have reviewed and approved the Behavioral Health Treatment Plan and Problem list.    Allergies  No Known Allergies    Hospital Day: 3 days      Assessment completed within view of staff    History  CC: inpatient followup  Interval HPI: Patient seen and evaluated by me.  Chart reviewed. Patient not feeling well physically.  Staff reports that he has been very withdrawn.  He is not getting good by mouth intake.  He has rated his anxiety level and depression level at a 7 out of 10.  He is being followed by the hospitalist team and has received IV fluids.    Interval Review of Systems:   General ROS: negative for - fever or malaise  Endocrine ROS: negative for - palpitations  Respiratory ROS: no cough, shortness of breath, or wheezing  Cardiovascular ROS: no chest pain or dyspnea on exertion  Gastrointestinal ROS: no abdominal pain,no black or bloody stools    /90 (BP Location: Right arm)   Pulse 59   Temp 98.4 °F (36.9 °C) (Temporal Artery )   Resp 18   Ht 172.7 cm (68\")   Wt 80.5 kg (177 lb 6.4 oz)   SpO2 97%   BMI 26.97 kg/m²     Mental Status Exam  Mood: depressed  Affect: dysphoric   Thought Processes: linear, logical, and goal directed  Thought Content: negativistic  Hallucinations: no  Suicidal Thoughts: moderate  Suicidal Plan/Intent:denies  Hopelesness:Severe  Homicidal Thoughts:  absent      Medical Decision Making:   Labs:     Lab Results (last 24 hours)     Procedure Component Value Units Date/Time    Basic Metabolic Panel [[id number]]  (Abnormal) Collected:  06/10/18 0506    Specimen:  Blood Updated:  06/10/18 0612     Glucose 97 mg/dL      BUN 27 (H) mg/dL      Creatinine 1.46 (H) mg/dL      Sodium 140 mmol/L      Potassium 4.0 mmol/L      Chloride 106 mmol/L      CO2 27.9 mmol/L      Calcium 9.0 mg/dL      eGFR Non  Amer 48 " (L) mL/min/1.73      BUN/Creatinine Ratio 18.5     Anion Gap 6.1 mmol/L     Narrative:       GFR Normal >60  Chronic Kidney Disease <60  Kidney Failure <15    Osmolality, Calculated [[id number]]  (Normal) Collected:  06/10/18 0506    Specimen:  Blood Updated:  06/10/18 0612     Osmolality Calc 284.4 mOsm/kg     CBC & Differential [[id number]] Collected:  06/09/18 1355    Specimen:  Blood Updated:  06/09/18 1409    Narrative:       The following orders were created for panel order CBC & Differential.  Procedure                               Abnormality         Status                     ---------                               -----------         ------                     CBC Auto Differential[[id number]]        Abnormal            Final result                 Please view results for these tests on the individual orders.    CBC Auto Differential [[id number]]  (Abnormal) Collected:  06/09/18 1355    Specimen:  Blood Updated:  06/09/18 1409     WBC 9.36 10*3/mm3      RBC 4.69 (L) 10*6/mm3      Hemoglobin 15.4 g/dL      Hematocrit 43.2 %      MCV 92.1 fL      MCH 32.8 pg      MCHC 35.6 g/dL      RDW 13.7 %      RDW-SD 44.6 fl      MPV 9.7 fL      Platelets 329 10*3/mm3      Neutrophil % 56.3 %      Lymphocyte % 27.1 %      Monocyte % 13.9 (H) %      Eosinophil % 1.9 %      Basophil % 0.6 %      Immature Grans % 0.2 %      Neutrophils, Absolute 5.26 10*3/mm3      Lymphocytes, Absolute 2.54 10*3/mm3      Monocytes, Absolute 1.30 (H) 10*3/mm3      Eosinophils, Absolute 0.18 10*3/mm3      Basophils, Absolute 0.06 10*3/mm3      Immature Grans, Absolute 0.02 10*3/mm3             Radiology:     Imaging Results (last 24 hours)     ** No results found for the last 24 hours. **            EKG:     ECG/EMG Results (most recent)     Procedure Component Value Units Date/Time    ECG 12 Lead [[id number]] Collected:  06/07/18 1443     Updated:  06/07/18 2023    Narrative:       Test Reason : Potential adverse reaction to medications.  Blood  Pressure : **/** mmHG  Vent. Rate : 056 BPM     Atrial Rate : 056 BPM     P-R Int : 150 ms          QRS Dur : 106 ms      QT Int : 412 ms       P-R-T Axes : 064 057 048 degrees     QTc Int : 397 ms    Sinus bradycardia  Otherwise normal ECG  No previous ECGs available  Confirmed by Thai Burnham ([id number]) on 6/7/2018 8:23:37 PM    Referred By:  SARAH           Confirmed By:Thai Burnham           Medications:     aspirin 81 mg Oral Daily   atenolol 25 mg Oral Daily   docusate sodium 100 mg Oral BID   FLUoxetine 10 mg Oral Daily   OLANZapine 5 mg Oral BID   pantoprazole 40 mg Oral Q PM   polyethylene glycol 17 g Oral Daily          All medications reviewed.      Assessment and Plan:    Active Problems:    Major depressive disorder with psychotic features      - Increase Prozac to 20 mg daily.  Continue Zyprexa 5 mg twice a day.      HTN (hypertension) with intermittent hypotension and orthostasis.      - Patient has received IV fluids.  Hospitalist team has seen the patient and stopped PRN clonidine.  Hold parameters placed on atenolol and Norvasc.  Appreciate the ongoing assistance from the hospitalist team.    Continue hospitalization for safety and stabilization.  Continue current level of Special Precautions (q15 minute checks).

## 2018-06-10 NOTE — PLAN OF CARE
Problem: Nutrition, Imbalanced: Inadequate Oral Intake (Adult)  Goal: Identify Related Risk Factors and Signs and Symptoms  Outcome: Ongoing (interventions implemented as appropriate)    Goal: Improved Oral Intake  Outcome: Ongoing (interventions implemented as appropriate)    Goal: Prevent Further Weight Loss  Outcome: Ongoing (interventions implemented as appropriate)

## 2018-06-10 NOTE — PLAN OF CARE
Problem: Patient Care Overview  Goal: Plan of Care Review  Outcome: Ongoing (interventions implemented as appropriate)   06/10/18 0106   Coping/Psychosocial   Plan of Care Reviewed With patient   Coping/Psychosocial   Patient Agreement with Plan of Care agrees   Plan of Care Review   Progress no change   OTHER   Outcome Summary Pt rates anxiety depression 7/10. Denies SI HI hallucinations. Pt isolating in room, encouraged pt to increase oral intake       Problem: Overarching Goals (Adult)  Goal: Adheres to Safety Considerations for Self and Others  Outcome: Ongoing (interventions implemented as appropriate)    Goal: Optimized Coping Skills in Response to Life Stressors  Outcome: Ongoing (interventions implemented as appropriate)    Goal: Develops/Participates in Therapeutic [place] to Support Successful Transition  Outcome: Ongoing (interventions implemented as appropriate)

## 2018-06-11 ENCOUNTER — APPOINTMENT (OUTPATIENT)
Dept: GENERAL RADIOLOGY | Facility: HOSPITAL | Age: 68
End: 2018-06-11

## 2018-06-11 LAB
ANION GAP SERPL CALCULATED.3IONS-SCNC: 3 MMOL/L (ref 3.6–11.2)
BUN BLD-MCNC: 16 MG/DL (ref 7–21)
BUN/CREAT SERPL: 14.4 (ref 7–25)
CALCIUM SPEC-SCNC: 9.3 MG/DL (ref 7.7–10)
CHLORIDE SERPL-SCNC: 106 MMOL/L (ref 99–112)
CO2 SERPL-SCNC: 28 MMOL/L (ref 24.3–31.9)
CREAT BLD-MCNC: 1.11 MG/DL (ref 0.43–1.29)
GFR SERPL CREATININE-BSD FRML MDRD: 66 ML/MIN/1.73
GLUCOSE BLD-MCNC: 94 MG/DL (ref 70–110)
OSMOLALITY SERPL CALC.SUM OF ELEC: 274.8 MOSM/KG (ref 273–305)
POTASSIUM BLD-SCNC: 3.7 MMOL/L (ref 3.5–5.3)
SODIUM BLD-SCNC: 137 MMOL/L (ref 135–153)

## 2018-06-11 PROCEDURE — 99232 SBSQ HOSP IP/OBS MODERATE 35: CPT | Performed by: PHYSICIAN ASSISTANT

## 2018-06-11 PROCEDURE — 72020 X-RAY EXAM OF SPINE 1 VIEW: CPT

## 2018-06-11 PROCEDURE — 72020 X-RAY EXAM OF SPINE 1 VIEW: CPT | Performed by: RADIOLOGY

## 2018-06-11 PROCEDURE — 99232 SBSQ HOSP IP/OBS MODERATE 35: CPT | Performed by: PSYCHIATRY & NEUROLOGY

## 2018-06-11 PROCEDURE — 80048 BASIC METABOLIC PNL TOTAL CA: CPT | Performed by: HOSPITALIST

## 2018-06-11 RX ORDER — AMLODIPINE BESYLATE 10 MG/1
10 TABLET ORAL
Status: DISCONTINUED | OUTPATIENT
Start: 2018-06-12 | End: 2018-06-20 | Stop reason: HOSPADM

## 2018-06-11 RX ORDER — SENNA AND DOCUSATE SODIUM 50; 8.6 MG/1; MG/1
2 TABLET, FILM COATED ORAL 2 TIMES DAILY
Status: DISCONTINUED | OUTPATIENT
Start: 2018-06-11 | End: 2018-06-20 | Stop reason: HOSPADM

## 2018-06-11 RX ORDER — FLUOXETINE HYDROCHLORIDE 20 MG/1
20 CAPSULE ORAL DAILY
Status: DISCONTINUED | OUTPATIENT
Start: 2018-06-11 | End: 2018-06-18

## 2018-06-11 RX ADMIN — PANTOPRAZOLE SODIUM 40 MG: 40 TABLET, DELAYED RELEASE ORAL at 18:03

## 2018-06-11 RX ADMIN — ATENOLOL 25 MG: 25 TABLET ORAL at 09:42

## 2018-06-11 RX ADMIN — OLANZAPINE 5 MG: 5 TABLET, FILM COATED ORAL at 20:34

## 2018-06-11 RX ADMIN — ASPIRIN 81 MG: 81 TABLET, CHEWABLE ORAL at 09:42

## 2018-06-11 RX ADMIN — OLANZAPINE 5 MG: 5 TABLET, FILM COATED ORAL at 09:42

## 2018-06-11 RX ADMIN — FLUOXETINE 20 MG: 20 CAPSULE ORAL at 09:41

## 2018-06-11 RX ADMIN — AMLODIPINE BESYLATE 5 MG: 5 TABLET ORAL at 09:41

## 2018-06-11 RX ADMIN — DOCUSATE SODIUM AND SENNOSIDES 2 TABLET: 8.6; 5 TABLET, FILM COATED ORAL at 15:06

## 2018-06-11 RX ADMIN — DOCUSATE SODIUM AND SENNOSIDES 2 TABLET: 8.6; 5 TABLET, FILM COATED ORAL at 20:34

## 2018-06-11 RX ADMIN — POLYETHYLENE GLYCOL (3350) 17 G: 17 POWDER, FOR SOLUTION ORAL at 09:42

## 2018-06-11 NOTE — PROGRESS NOTES
"INPATIENT PSYCHIATRIC PROGRESS NOTE    Name:  Ernesto Easley  :  1950  MRN:  [id number]  Visit Number:  [id number]  Length of stay:  4    Behavioral Health Treatment Plan and Problem List: I have reviewed and approved the Behavioral Health Treatment Plan and Problem list.    SUBJECTIVE    CC: \"Not sleeping, might as well die at home\"    INTERVAL HISTORY: Not eating or sleeping, isolating on the unit, remains convinced has CA and is going to die, feels hopeless, however denying suicidal intent \"here\".    Remain delusional regarding that he is convinced he has a terminal illness, at serious risk for self harm as a result of this.     Discussed treatment options with the patient and subsequently with his wife via phone, at this point ECT seems to be the best treatment option given the severity of his depression as described.  Wife to visit today, nursing to educate both her and her  regarding ECT.    Depression rating 10/10  Anxiety rating 10/10  Sleep: 2-3 hours at most.          Review of Systems   Respiratory: Negative.    Cardiovascular: Negative.    Gastrointestinal: Negative.    Musculoskeletal: Negative.    Neurological: Negative.          OBJECTIVE    Temp:  [97.8 °F (36.6 °C)-98.1 °F (36.7 °C)] 97.8 °F (36.6 °C)  Heart Rate:  [57-79] 75  Resp:  [18] 18  BP: (115-163)/() 163/95    MENTAL STATUS EXAM:      Appearance:Casually dressed, good hygeine.   Cooperation:Cooperative  Psychomotor: No psychomotor agitation/retardation, No EPS, No motor tics  Speech-normal rate, amount.   Mood/Affect: Depressed  Thought Processes: associations intact  Thought Content: dilussional, negativistic and distorted   Hallucination(s): none  Hopelessness: Yes  Optimistic:No  Suicidal Thoughts:  moderate  Suicidal Plan/Intent: none  Homicidal Thoughts:  absent  Orientation: oriented x 3  Memory: recent intact    Lab Results (last 24 hours)     Procedure Component Value Units Date/Time    Basic Metabolic Panel " [[id number]]  (Abnormal) Collected:  06/11/18 0514    Specimen:  Blood Updated:  06/11/18 0625     Glucose 94 mg/dL      BUN 16 mg/dL      Creatinine 1.11 mg/dL      Sodium 137 mmol/L      Potassium 3.7 mmol/L      Chloride 106 mmol/L      CO2 28.0 mmol/L      Calcium 9.3 mg/dL      eGFR Non African Amer 66 mL/min/1.73      BUN/Creatinine Ratio 14.4     Anion Gap 3.0 (L) mmol/L     Narrative:       GFR Normal >60  Chronic Kidney Disease <60  Kidney Failure <15    Osmolality, Calculated [[id number]]  (Normal) Collected:  06/11/18 0514    Specimen:  Blood Updated:  06/11/18 0625     Osmolality Calc 274.8 mOsm/kg            Imaging Results (last 24 hours)     ** No results found for the last 24 hours. **           ECG/EMG Results (most recent)     Procedure Component Value Units Date/Time    ECG 12 Lead [[id number]] Collected:  06/07/18 1443     Updated:  06/07/18 2023    Narrative:       Test Reason : Potential adverse reaction to medications.  Blood Pressure : **/** mmHG  Vent. Rate : 056 BPM     Atrial Rate : 056 BPM     P-R Int : 150 ms          QRS Dur : 106 ms      QT Int : 412 ms       P-R-T Axes : 064 057 048 degrees     QTc Int : 397 ms    Sinus bradycardia  Otherwise normal ECG  No previous ECGs available  Confirmed by Thai Burnham ([id number]) on 6/7/2018 8:23:37 PM    Referred By:  SARAH           Confirmed By:Thai Burnham           ALLERGIES: Patient has no known allergies.      Current Facility-Administered Medications:   •  acetaminophen (TYLENOL) tablet 650 mg, 650 mg, Oral, Q4H PRN, Eduardo Bhandari MD  •  ALPRAZolam (XANAX) tablet 0.5 mg, 0.5 mg, Oral, TID PRN, Eduardo Bhandari MD  •  aluminum-magnesium hydroxide-simethicone (MAALOX MAX) 400-400-40 MG/5ML suspension 15 mL, 15 mL, Oral, Q6H PRN, Eduardo Bhandari MD  •  amLODIPine (NORVASC) tablet 5 mg, 5 mg, Oral, Q24H, Beatris Velazquez MD, 5 mg at 06/10/18 5929  •  aspirin chewable tablet 81 mg, 81 mg, Oral, Daily, Eduardo Bhandari MD, 81 mg at 06/10/18  1003  •  atenolol (TENORMIN) tablet 25 mg, 25 mg, Oral, Daily, MARY Salas, 25 mg at 06/10/18 1003  •  benzonatate (TESSALON) capsule 100 mg, 100 mg, Oral, TID PRN, Eduardo Bhandari MD  •  benztropine (COGENTIN) tablet 1 mg, 1 mg, Oral, Daily PRN **OR** benztropine (COGENTIN) injection 0.5 mg, 0.5 mg, Intramuscular, Daily PRN, Eduardo Bhandari MD  •  bisacodyl (DULCOLAX) suppository 10 mg, 10 mg, Rectal, Daily, Beatris Velazquez MD  •  famotidine (PEPCID) tablet 20 mg, 20 mg, Oral, BID PRN, Eduardo Bhandari MD  •  FLUoxetine (PROzac) capsule 10 mg, 10 mg, Oral, Daily, Thomas De Leon MD, 10 mg at 06/10/18 1003  •  magnesium hydroxide (MILK OF MAGNESIA) suspension 2400 mg/10mL 10 mL, 10 mL, Oral, Daily PRN, Eduardo Bhandari MD  •  OLANZapine (zyPREXA) tablet 5 mg, 5 mg, Oral, BID, Thomas De Leon MD, 5 mg at 06/10/18 2041  •  OLANZapine zydis (zyPREXA) disintegrating tablet 5 mg, 5 mg, Oral, Q8H PRN, Eduardo Bhandari MD  •  ondansetron (ZOFRAN) tablet 4 mg, 4 mg, Oral, Q6H PRN, Eduardo Bhandari MD  •  pantoprazole (PROTONIX) EC tablet 40 mg, 40 mg, Oral, Q PM, Eduardo Bhandari MD, 40 mg at 06/10/18 1625  •  polyethylene glycol 3350 powder (packet), 17 g, Oral, Daily, Thomas Canela MD, 17 g at 06/10/18 1003  •  sennosides-docusate sodium (SENOKOT-S) 8.6-50 MG tablet 2 tablet, 2 tablet, Oral, Nightly, Beatris Velazquez MD, 2 tablet at 06/10/18 2041  •  sodium chloride (OCEAN) nasal spray 2 spray, 2 spray, Each Nare, PRN, Eduardo Russellm, MD  •  traZODone (DESYREL) tablet 50 mg, 50 mg, Oral, Nightly PRNEduardo MD, 50 mg at 06/08/18 0052    ASSESSMENT & PLAN    Patient Active Problem List   Diagnosis   • Major depressive disorder with psychotic features Plan: To continue medications that include Prozac and Zyprexa, to discussed ECT as a treatment option more completely with the patient and his wife, to continue supportive psychotherapeutic efforts individual and group.     • Depression with suicidal  ideation Plan: Patient is on special precautions.     • HTN (hypertension) Plan: Hospitalist consultant evaluating patient's blood pressure medications prospectively.  Follow their recommendations          Suicide precautions: Suicide precaution Level 3 (q15 min checks)     Behavioral Health Treatment Plan and Problem List: I have reviewed and approved the Behavioral Health Treatment Plan and Problem list.    Clinician:  Thomas De Leon MD  06/11/18  8:10 AM    Dictated utilizing Dragon dictation

## 2018-06-11 NOTE — PLAN OF CARE
Problem: Patient Care Overview  Goal: Plan of Care Review  Outcome: Ongoing (interventions implemented as appropriate)   06/11/18 0321   Coping/Psychosocial   Plan of Care Reviewed With patient   Coping/Psychosocial   Patient Agreement with Plan of Care agrees   Plan of Care Review   Progress no change   OTHER   Outcome Summary Pt rates anxiety depression 10/10. Denies SI HI hallucinations. Continue to encourage increasing PO intake       Problem: Overarching Goals (Adult)  Goal: Adheres to Safety Considerations for Self and Others  Outcome: Ongoing (interventions implemented as appropriate)    Goal: Optimized Coping Skills in Response to Life Stressors  Outcome: Ongoing (interventions implemented as appropriate)    Goal: Develops/Participates in Therapeutic [place] to Support Successful Transition  Outcome: Ongoing (interventions implemented as appropriate)

## 2018-06-11 NOTE — PROGRESS NOTES
1500  Patient isolating in his room, he is having delusional thoughts that he is thinks that he is dying of cancer. Patient reports not sleeping well and tells Dr. De Leon that he might as well go home and die. He continues to rate anxiety and depression at 10/10 He reports sleeping 2-3 hrs at night. Dr. De Leon discussed ECT with him today and his wife plans to visit today to discuss ECT.

## 2018-06-11 NOTE — PLAN OF CARE
Problem: Patient Care Overview  Goal: Plan of Care Review  Outcome: Ongoing (interventions implemented as appropriate)   06/11/18 1631   Coping/Psychosocial   Plan of Care Reviewed With patient   Coping/Psychosocial   Patient Agreement with Plan of Care agrees   Plan of Care Review   Progress no change   OTHER   Outcome Summary Isolates in his room. Delusional-thinks he has cancer and that he is dying. Rates anxiety and depression both 10. Denies si.       Problem: Overarching Goals (Adult)  Goal: Adheres to Safety Considerations for Self and Others  Outcome: Ongoing (interventions implemented as appropriate)    Goal: Optimized Coping Skills in Response to Life Stressors  Outcome: Ongoing (interventions implemented as appropriate)    Goal: Develops/Participates in Therapeutic [place] to Support Successful Transition  Outcome: Ongoing (interventions implemented as appropriate)      Problem: Fall Risk (Adult)  Goal: Identify Related Risk Factors and Signs and Symptoms  Outcome: Ongoing (interventions implemented as appropriate)    Goal: Absence of Fall  Outcome: Ongoing (interventions implemented as appropriate)

## 2018-06-11 NOTE — PROGRESS NOTES
Patient Identification:  Name:  Ernesto Easley  Age:  67 y.o.  Sex:  male  :  1950  MRN:  [id number]  Visit Number:  [id number]  Primary Care Provider:  GLO Huston    Length of stay:  4    67-year-old  male admitted to the [facility] for major depression and psychosis. Hospitalist service initially consulted for low blood pressures.  He was also found to have mild acute on chronic stage III renal failure.   Consult appears to have been placed again for ECT clearance.      Subjective:      Mr. Easley is resting in bed on this date. He reports he is considering ECT but wants to discuss this with his wife.  He denies chest pain, palpitations, and shortness of breath.   Mr. Easley denies known history of seizures. He reports he has never had ECT treatment.  He reports he has had general anesthesia before and was recently anesthetized prior to colonoscopy.   He does report some intermittent constipation.  He reports he has had one bowel movement in the past 4 days.    ----------------------------------------------------------------------------------------------------------------------  [facility] Meds:    [START ON 2018] amLODIPine 10 mg Oral Q24H   aspirin 81 mg Oral Daily   atenolol 25 mg Oral Daily   bisacodyl 10 mg Rectal Daily   FLUoxetine 20 mg Oral Daily   OLANZapine 5 mg Oral BID   pantoprazole 40 mg Oral Q PM   polyethylene glycol 17 g Oral Daily   sennosides-docusate sodium 2 tablet Oral Nightly        ----------------------------------------------------------------------------------------------------------------------  Vital Signs:  Temp:  [97.8 °F (36.6 °C)-98.1 °F (36.7 °C)] 98.1 °F (36.7 °C)  Heart Rate:  [57-75] 69  Resp:  [18] 18  BP: (141-193)/() 153/106  1    18  1040   Weight: 80.5 kg (177 lb 6.4 oz)     Body mass index is 26.97 kg/m².  No intake or output data in the 24 hours ending 18 1131  No intake/output data recorded.  Diet  Regular  ----------------------------------------------------------------------------------------------------------------------  Physical exam:  Constitutional:  Well-developed and well-nourished.  No respiratory distress.      HENT:  Head:  Normocephalic and atraumatic.  Mouth:  Moist mucous membranes.    Eyes:  Conjunctivae and EOM are normal.  Pupils are equal, round, and reactive to light.  No scleral icterus.    Neck:  Neck supple.  No JVD present.    Cardiovascular:  Normal rate, regular rhythm and normal heart sounds with no murmur.  Pulmonary/Chest:  No respiratory distress, no wheezes, no crackles, with normal breath sounds and good air movement.  Abdominal:  Soft.  Bowel sounds are normal.  No distension and no tenderness.   Musculoskeletal:  No edema, no tenderness, and no deformity.  No red or swollen joints anywhere.    Neurological:  Alert and oriented to person, place, and time.  No cranial nerve deficit.  No tongue deviation.  No facial droop.  No slurred speech.   Skin:  Skin is warm and dry. No rash noted. No pallor.   ----------------------------------------------------------------------------------------------------------------------  EKG from admission:         ----------------------------------------------------------------------------------------------------------------------        Results from last 7 days  Lab Units 06/09/18  1355 06/07/18  0843   WBC 10*3/mm3 9.36 9.67   HEMOGLOBIN g/dL 15.4 16.4   HEMATOCRIT % 43.2 45.2   MCV fL 92.1 89.0   MCHC g/dL 35.6 36.3   PLATELETS 10*3/mm3 329 360           Results from last 7 days  Lab Units 06/11/18  0514 06/10/18  0506 06/09/18  0757 06/07/18  0843   SODIUM mmol/L 137 140 139 136   POTASSIUM mmol/L 3.7 4.0 3.8 3.8   CHLORIDE mmol/L 106 106 102 104   CO2 mmol/L 28.0 27.9 28.5 26.1   BUN mg/dL 16 27* 21 16   CREATININE mg/dL 1.11 1.46* 1.37* 1.21   EGFR IF NONAFRICN AM mL/min/1.73 66 48* 52* 60*   CALCIUM mg/dL 9.3 9.0 9.9 9.8   GLUCOSE mg/dL 94  97 129* 111*   ALBUMIN g/dL  --   --   --  4.60   BILIRUBIN mg/dL  --   --   --  1.2   ALK PHOS U/L  --   --   --  64   AST (SGOT) U/L  --   --   --  24   ALT (SGPT) U/L  --   --   --  25   Estimated Creatinine Clearance: 75.5 mL/min (by C-G formula based on SCr of 1.11 mg/dL).  No results found for: AMMONIA    Results from last 7 days  Lab Units 06/09/18  0757   CHOLESTEROL mg/dL 178   TRIGLYCERIDES mg/dL 119   HDL CHOL mg/dL 39*   LDL CHOL mg/dL 115*                 ----------------------------------------------------------------------------------------------------------------------  Imaging Results (last 24 hours)     ** No results found for the last 24 hours. **        ----------------------------------------------------------------------------------------------------------------------  Assessment and Plan:    -Pre-ECT clearance: CT head unremarkable upon admission.  Thyroid studies unremarkable.  Lumbar spine xray is pending at present.   Will likely need better control of hypertension prior to procedure.   -Hypertension, uncontrolled;  Norvasc increased to 10mg daily (prior home dose). Continue to hold Losartan/HCTZ combination pill given improvement in renal failure. Will continue to monitor.   -Acute on chronic renal failure stage III, improved:   Continue to hold nephrotoxic medications. Continue to encourage oral intake.     -Constipation: Increased colace to BID.  Continue with daily miralax.    -Major depression with psychosis, care per primary team.     (Reviewed data and agree)    Christiana Holland PA-C  06/11/18  11:31 AM

## 2018-06-12 LAB
ANION GAP SERPL CALCULATED.3IONS-SCNC: 8.1 MMOL/L (ref 3.6–11.2)
BASOPHILS # BLD AUTO: 0.06 10*3/MM3 (ref 0–0.3)
BASOPHILS NFR BLD AUTO: 0.8 % (ref 0–2)
BUN BLD-MCNC: 17 MG/DL (ref 7–21)
BUN/CREAT SERPL: 13.1 (ref 7–25)
CALCIUM SPEC-SCNC: 9.4 MG/DL (ref 7.7–10)
CHLORIDE SERPL-SCNC: 105 MMOL/L (ref 99–112)
CO2 SERPL-SCNC: 26.9 MMOL/L (ref 24.3–31.9)
CREAT BLD-MCNC: 1.3 MG/DL (ref 0.43–1.29)
DEPRECATED RDW RBC AUTO: 44.3 FL (ref 37–54)
EOSINOPHIL # BLD AUTO: 0.22 10*3/MM3 (ref 0–0.7)
EOSINOPHIL NFR BLD AUTO: 2.8 % (ref 0–7)
ERYTHROCYTE [DISTWIDTH] IN BLOOD BY AUTOMATED COUNT: 13.4 % (ref 11.5–14.5)
GFR SERPL CREATININE-BSD FRML MDRD: 55 ML/MIN/1.73
GLUCOSE BLD-MCNC: 93 MG/DL (ref 70–110)
HCT VFR BLD AUTO: 42.6 % (ref 42–52)
HGB BLD-MCNC: 14.8 G/DL (ref 14–18)
IMM GRANULOCYTES # BLD: 0.01 10*3/MM3 (ref 0–0.03)
IMM GRANULOCYTES NFR BLD: 0.1 % (ref 0–0.5)
LYMPHOCYTES # BLD AUTO: 2.41 10*3/MM3 (ref 1–3)
LYMPHOCYTES NFR BLD AUTO: 30.9 % (ref 16–46)
MCH RBC QN AUTO: 32 PG (ref 27–33)
MCHC RBC AUTO-ENTMCNC: 34.7 G/DL (ref 33–37)
MCV RBC AUTO: 92.2 FL (ref 80–94)
MONOCYTES # BLD AUTO: 0.95 10*3/MM3 (ref 0.1–0.9)
MONOCYTES NFR BLD AUTO: 12.2 % (ref 0–12)
NEUTROPHILS # BLD AUTO: 4.16 10*3/MM3 (ref 1.4–6.5)
NEUTROPHILS NFR BLD AUTO: 53.2 % (ref 40–75)
OSMOLALITY SERPL CALC.SUM OF ELEC: 280.6 MOSM/KG (ref 273–305)
PLATELET # BLD AUTO: 276 10*3/MM3 (ref 130–400)
PMV BLD AUTO: 9.9 FL (ref 6–10)
POTASSIUM BLD-SCNC: 3.7 MMOL/L (ref 3.5–5.3)
RBC # BLD AUTO: 4.62 10*6/MM3 (ref 4.7–6.1)
SODIUM BLD-SCNC: 140 MMOL/L (ref 135–153)
WBC NRBC COR # BLD: 7.81 10*3/MM3 (ref 4.5–12.5)

## 2018-06-12 PROCEDURE — 80048 BASIC METABOLIC PNL TOTAL CA: CPT | Performed by: INTERNAL MEDICINE

## 2018-06-12 PROCEDURE — 85025 COMPLETE CBC W/AUTO DIFF WBC: CPT | Performed by: INTERNAL MEDICINE

## 2018-06-12 PROCEDURE — 99232 SBSQ HOSP IP/OBS MODERATE 35: CPT | Performed by: PHYSICIAN ASSISTANT

## 2018-06-12 PROCEDURE — 99232 SBSQ HOSP IP/OBS MODERATE 35: CPT | Performed by: PSYCHIATRY & NEUROLOGY

## 2018-06-12 RX ORDER — ATENOLOL 50 MG/1
50 TABLET ORAL DAILY
Status: DISCONTINUED | OUTPATIENT
Start: 2018-06-13 | End: 2018-06-14

## 2018-06-12 RX ORDER — CLONIDINE HYDROCHLORIDE 0.1 MG/1
0.1 TABLET ORAL EVERY 8 HOURS SCHEDULED
Status: DISCONTINUED | OUTPATIENT
Start: 2018-06-12 | End: 2018-06-12

## 2018-06-12 RX ORDER — CLONIDINE HYDROCHLORIDE 0.1 MG/1
0.1 TABLET ORAL EVERY 8 HOURS PRN
Status: DISCONTINUED | OUTPATIENT
Start: 2018-06-12 | End: 2018-06-20 | Stop reason: HOSPADM

## 2018-06-12 RX ADMIN — OLANZAPINE 5 MG: 5 TABLET, FILM COATED ORAL at 20:27

## 2018-06-12 RX ADMIN — PANTOPRAZOLE SODIUM 40 MG: 40 TABLET, DELAYED RELEASE ORAL at 16:50

## 2018-06-12 RX ADMIN — FLUOXETINE 20 MG: 20 CAPSULE ORAL at 09:08

## 2018-06-12 RX ADMIN — CLONIDINE HYDROCHLORIDE 0.1 MG: 0.1 TABLET ORAL at 15:52

## 2018-06-12 RX ADMIN — ASPIRIN 81 MG: 81 TABLET, CHEWABLE ORAL at 09:08

## 2018-06-12 RX ADMIN — OLANZAPINE 5 MG: 5 TABLET, FILM COATED ORAL at 09:08

## 2018-06-12 RX ADMIN — ATENOLOL 25 MG: 25 TABLET ORAL at 09:08

## 2018-06-12 RX ADMIN — AMLODIPINE BESYLATE 10 MG: 10 TABLET ORAL at 09:07

## 2018-06-12 NOTE — PROGRESS NOTES
"Patient Identification:  Name:  Ernesto Easley  Age:  67 y.o.  Sex:  male  :  1950  MRN:  [id number]  Visit Number:  [id number]  Primary Care Provider:  GLO Huston    Length of stay:  5    67-year-old  male admitted to the [facility] for major depression and psychosis. Hospitalist service initially consulted for low blood pressures.  He was also found to have mild acute on chronic stage III renal failure.   Consult appears to have been placed again for ECT clearance.      Subjective:      Mr. Easley is sitting up on the side of the bed.  He denies chest pain, shortness of breath, and palpitations. He believes the last set of labs he had drawn was over a year ago with Dr. Virgil Brito in [place], [place].  Order to request these labs has been placed.  He reports history of renal failure with labs \"up and down\".  He is unsure of his baseline.  He reports one bowel movement.        ----------------------------------------------------------------------------------------------------------------------  [facility] Meds:    amLODIPine 10 mg Oral Q24H   aspirin 81 mg Oral Daily   [START ON 2018] atenolol 50 mg Oral Daily   bisacodyl 10 mg Rectal Daily   FLUoxetine 20 mg Oral Daily   OLANZapine 5 mg Oral BID   pantoprazole 40 mg Oral Q PM   polyethylene glycol 17 g Oral Daily   sennosides-docusate sodium 2 tablet Oral BID        ----------------------------------------------------------------------------------------------------------------------  Vital Signs:    Vitals:    18 2000 18 0800 18 0815 18 0907   BP: 114/78 (!) 183/111 (!) 173/113 (!) 183/111   BP Location: Right arm Right arm Left arm    Patient Position: Sitting Sitting     Pulse: 74 78  78   Resp: 18 18     Temp: 98.3 °F (36.8 °C) 97.1 °F (36.2 °C)     TempSrc: Temporal Artery  Temporal Artery      SpO2: 97% 96%     Weight:       Height:               Temp:  [97.1 °F (36.2 °C)-98.3 °F (36.8 °C)] 97.1 " °F (36.2 °C)  Heart Rate:  [74-78] 78  Resp:  [18] 18  BP: (114-183)/() 183/111  1    06/07/18  1040   Weight: 80.5 kg (177 lb 6.4 oz)     Body mass index is 26.97 kg/m².  No intake or output data in the 24 hours ending 06/12/18 1235  No intake/output data recorded.  Diet Regular; Low Sodium; 2,000 mg Na  ----------------------------------------------------------------------------------------------------------------------  Physical exam:  Constitutional:  Well-developed and well-nourished.  No respiratory distress.      HENT:  Head:  Normocephalic and atraumatic.  Mouth:  Moist mucous membranes.    Eyes:  Conjunctivae and EOM are normal.  Pupils are equal, round, and reactive to light.  No scleral icterus.    Neck:  Neck supple.  No JVD present.    Cardiovascular:  Normal rate, regular rhythm and normal heart sounds with no murmur.  Pulmonary/Chest:  No respiratory distress, no wheezes, no crackles, with normal breath sounds and good air movement.  Abdominal:  Soft.  Bowel sounds are normal.  No distension and no tenderness.   Musculoskeletal:  No edema, no tenderness, and no deformity.  No red or swollen joints anywhere.    Neurological:  Alert and oriented to person, place, and time.  No cranial nerve deficit.  No tongue deviation.  No facial droop.  No slurred speech.   Skin:  Skin is warm and dry. No rash noted. No pallor.   ----------------------------------------------------------------------------------------------------------------------  ----------------------------------------------------------------------------------------------------------------------        Results from last 7 days  Lab Units 06/12/18  0448 06/09/18  1355 06/07/18  0843   WBC 10*3/mm3 7.81 9.36 9.67   HEMOGLOBIN g/dL 14.8 15.4 16.4   HEMATOCRIT % 42.6 43.2 45.2   MCV fL 92.2 92.1 89.0   MCHC g/dL 34.7 35.6 36.3   PLATELETS 10*3/mm3 276 926 360           Results from last 7 days  Lab Units 06/12/18  0448 06/11/18  0514  06/10/18  0506  06/07/18  0843   SODIUM mmol/L 140 137 140  < > 136   POTASSIUM mmol/L 3.7 3.7 4.0  < > 3.8   CHLORIDE mmol/L 105 106 106  < > 104   CO2 mmol/L 26.9 28.0 27.9  < > 26.1   BUN mg/dL 17 16 27*  < > 16   CREATININE mg/dL 1.30* 1.11 1.46*  < > 1.21   EGFR IF NONAFRICN AM mL/min/1.73 55* 66 48*  < > 60*   CALCIUM mg/dL 9.4 9.3 9.0  < > 9.8   GLUCOSE mg/dL 93 94 97  < > 111*   ALBUMIN g/dL  --   --   --   --  4.60   BILIRUBIN mg/dL  --   --   --   --  1.2   ALK PHOS U/L  --   --   --   --  64   AST (SGOT) U/L  --   --   --   --  24   ALT (SGPT) U/L  --   --   --   --  25   < > = values in this interval not displayed.Estimated Creatinine Clearance: 64.5 mL/min (A) (by C-G formula based on SCr of 1.3 mg/dL (H)).  No results found for: AMMONIA    Results from last 7 days  Lab Units 06/09/18  0757   CHOLESTEROL mg/dL 178   TRIGLYCERIDES mg/dL 119   HDL CHOL mg/dL 39*   LDL CHOL mg/dL 115*         Xr Abdomen 2 View With Chest 1 View    Result Date: 6/7/2018  1. Unremarkable chest. 2. Findings most suggestive of mild focal ileus left mid abdomen. No adrian obstruction identified. 3. Constipation. Otherwise unremarkable exam.   This report was finalized on 6/7/2018 9:57 AM by Dr. Aamir Dewey MD.      Ct Head Without Contrast    Result Date: 6/7/2018  No CT evidence of acute intracranial abnormality.  This report was finalized on 6/7/2018 9:21 AM by Dr. Aamir Dewey MD.      Xr Spine Lumbar Lateral    Result Date: 6/11/2018  Multilevel degenerative disc disease. No radiographic evidence of acute compression fracture of the lumbar spine.  This report was finalized on 6/11/2018 10:52 AM by Dr. Gera Chacon MD.              ----------------------------------------------------------------------------------------------------------------------  Imaging Results (last 24 hours)     ** No results found for the last 24 hours. **     "    ----------------------------------------------------------------------------------------------------------------------  Assessment and Plan:    -Pre-ECT clearance:  CT head unremarkable and lumbar spine with degenerative changes. Thyroid studies unremarkable.  Awaiting improvement in blood pressure control.  EKG reviewed. No prior difficulty with anesthesia or known seizure disorder as previously documented.       -Hypertension, uncontrolled: Blood pressures last evening at 10PM were improved at 114/70; however, pressures prior to receiving AM meds were elevated at 180s/110s.  Norvasc 10mg was given in addition to atenolol.  Discussed with NH staff to recheck blood pressure.  Awaiting 2PM recheck following Norvasc and Atenolol.   If blood pressures remain elevated will add PRN medication such as clonidine.     -Acute on likely chronic renal failure stage III: Creatinine mildly increased today. Encouraged oral intake. Mr. Easley tells me his kidney function \"goes up and down\" but is unsure of his baseline.  His last labs were drawn around one year ago, he thinks at the office of Dr. Virgil Brito in [place], [place]. A records request has been placed to obtain these to establish baseline creatinine, as he may very well be there at present. Continue to hold nephrotoxins.     -Constipation, improving: Continue current regiment.     -Major depression with psychosis, care per primary team.      (D/W Rissa, Agree with above, BP improved with prn clonidine)        Christiana Holland PA-C  06/12/18  12:35 PM    "

## 2018-06-12 NOTE — PROGRESS NOTES
"930  Met with patient to discuss patients family visit last night and questions regarding ECT. Family watched the educational materials for ECT and discussed with the patient. They have requested to meet with Dr. De Leon for consultation, he will see them tomorrow at 9:00.    Met with patient for individual, he continues to report that treatment for him is pointless and says nothing is helping. He continues to say he has cancer and he feels like \"throwing in the towel\". He has been told he does not have cancer however believes that he does.     Continue stabilization individual and group therapy to focus on patient participating in his care and recommendations of the treatment team.   "

## 2018-06-12 NOTE — PLAN OF CARE
Problem: Patient Care Overview  Goal: Plan of Care Review  Outcome: Ongoing (interventions implemented as appropriate)   06/12/18 2699   Coping/Psychosocial   Plan of Care Reviewed With patient   Coping/Psychosocial   Patient Agreement with Plan of Care agrees   Plan of Care Review   Progress no change   OTHER   Outcome Summary Out of his room more today. Evasive. Paranoid. Rates his anxiety and depression both 10. Denies si/hi. Took a shower-refused to shave.       Problem: Overarching Goals (Adult)  Goal: Adheres to Safety Considerations for Self and Others  Outcome: Ongoing (interventions implemented as appropriate)    Goal: Optimized Coping Skills in Response to Life Stressors  Outcome: Ongoing (interventions implemented as appropriate)    Goal: Develops/Participates in Therapeutic Union to Support Successful Transition  Outcome: Ongoing (interventions implemented as appropriate)      Problem: Fall Risk (Adult)  Goal: Identify Related Risk Factors and Signs and Symptoms  Outcome: Ongoing (interventions implemented as appropriate)    Goal: Absence of Fall  Outcome: Ongoing (interventions implemented as appropriate)

## 2018-06-12 NOTE — NURSING NOTE
I spoke with patient's wife-informed her to be here tomorrow am at 0900 to talk with Dr. De Leon. Dr. De Leon states he can meet with them at this time.

## 2018-06-12 NOTE — PROGRESS NOTES
"INPATIENT PSYCHIATRIC PROGRESS NOTE    Name:  Ernesto Easley  :  1950  MRN:  [id number]  Visit Number:  [id number]  Length of stay:  5    Behavioral Health Treatment Plan and Problem List: I have reviewed and approved the Behavioral Health Treatment Plan and Problem list.    SUBJECTIVE  CC: \"No point in trying to treat me, it's hopeless\".     INTERVAL HISTORY: No change in the patient's basic delusional depression seeing his situation is hopeless, that is going to die of cancer, that there is no use in treatment.  \"Just send me home I can die there\".    Understand that the patient's family and the patient has viewed the ECT video and have questions requesting I meet with him tomorrow morning, no decision about ECT, patient is continuing his current medication that include Zyprexa and Prozac.    Patient seen and evaluated by hospitalist service, do not see a note actually clearing patient for ECT.    Depression rating 10/10  Anxiety rating 10/10  Sleep: poor         Review of Systems   Respiratory: Negative.    Cardiovascular: Negative.    Gastrointestinal: Positive for constipation.   Musculoskeletal: Negative.    Neurological: Negative.          OBJECTIVE    Temp:  [97.1 °F (36.2 °C)-98.3 °F (36.8 °C)] 97.1 °F (36.2 °C)  Heart Rate:  [74-78] 78  Resp:  [18] 18  BP: (114-183)/() 183/111    MENTAL STATUS EXAM:      Appearance:Casually dressed, good hygeine.   Cooperation:Cooperative  Psychomotor: No psychomotor agitation/retardation, No EPS, No motor tics  Speech-normal rate, amount.   Mood/Affect: Depressed  Thought Processes: poverty of thought  Thought Content: dilussional, negativistic and distorted   Hallucination(s): none  Hopelessness: Yes  Optimistic:No  Suicidal Thoughts:  moderate  Suicidal Plan/Intent: none  Homicidal Thoughts:  absent  Orientation: oriented x 3  Memory: recent intact    Lab Results (last 24 hours)     Procedure Component Value Units Date/Time    Basic Metabolic Panel [[id number]] "  (Abnormal) Collected:  06/12/18 0448    Specimen:  Blood Updated:  06/12/18 0618     Glucose 93 mg/dL      BUN 17 mg/dL      Creatinine 1.30 (H) mg/dL      Sodium 140 mmol/L      Potassium 3.7 mmol/L      Chloride 105 mmol/L      CO2 26.9 mmol/L      Calcium 9.4 mg/dL      eGFR Non African Amer 55 (L) mL/min/1.73      BUN/Creatinine Ratio 13.1     Anion Gap 8.1 mmol/L     Narrative:       GFR Normal >60  Chronic Kidney Disease <60  Kidney Failure <15    Osmolality, Calculated [[id number]]  (Normal) Collected:  06/12/18 0448    Specimen:  Blood Updated:  06/12/18 0618     Osmolality Calc 280.6 mOsm/kg     CBC & Differential [[id number]] Collected:  06/12/18 0448    Specimen:  Blood Updated:  06/12/18 0546    Narrative:       The following orders were created for panel order CBC & Differential.  Procedure                               Abnormality         Status                     ---------                               -----------         ------                     CBC Auto Differential[[id number]]        Abnormal            Final result                 Please view results for these tests on the individual orders.    CBC Auto Differential [[id number]]  (Abnormal) Collected:  06/12/18 0448    Specimen:  Blood Updated:  06/12/18 0546     WBC 7.81 10*3/mm3      RBC 4.62 (L) 10*6/mm3      Hemoglobin 14.8 g/dL      Hematocrit 42.6 %      MCV 92.2 fL      MCH 32.0 pg      MCHC 34.7 g/dL      RDW 13.4 %      RDW-SD 44.3 fl      MPV 9.9 fL      Platelets 276 10*3/mm3      Neutrophil % 53.2 %      Lymphocyte % 30.9 %      Monocyte % 12.2 (H) %      Eosinophil % 2.8 %      Basophil % 0.8 %      Immature Grans % 0.1 %      Neutrophils, Absolute 4.16 10*3/mm3      Lymphocytes, Absolute 2.41 10*3/mm3      Monocytes, Absolute 0.95 (H) 10*3/mm3      Eosinophils, Absolute 0.22 10*3/mm3      Basophils, Absolute 0.06 10*3/mm3      Immature Grans, Absolute 0.01 10*3/mm3            Imaging Results (last 24 hours)     Procedure Component  Value Units Date/Time    XR Spine Lumbar Lateral [[id number]] Collected:  06/11/18 1051     Updated:  06/11/18 1141    Narrative:       EXAMINATION: XR SPINE LUMBAR LATERAL-      CLINICAL INDICATION: PAIN      TECHNIQUE: Lateral X-rays of the lumbosacral spine.      COMPARISON: NONE.      FINDINGS: There is multilevel degenerative disc disease. Mild anterior  osteophyte formation is noted. Atherosclerotic calcification is seen in  the aorta. There is mild facet arthropathy. Vertebral body height and  alignment are maintained. No radiographic evidence of acute compression  fracture.       Impression:       Multilevel degenerative disc disease. No radiographic  evidence of acute compression fracture of the lumbar spine.     This report was finalized on 6/11/2018 10:52 AM by Dr. Gera Chacon MD.              ECG/EMG Results (most recent)     Procedure Component Value Units Date/Time    ECG 12 Lead [[id number]] Collected:  06/07/18 1443     Updated:  06/07/18 2023    Narrative:       Test Reason : Potential adverse reaction to medications.  Blood Pressure : **/** mmHG  Vent. Rate : 056 BPM     Atrial Rate : 056 BPM     P-R Int : 150 ms          QRS Dur : 106 ms      QT Int : 412 ms       P-R-T Axes : 064 057 048 degrees     QTc Int : 397 ms    Sinus bradycardia  Otherwise normal ECG  No previous ECGs available  Confirmed by Thai Burnham ([id number]) on 6/7/2018 8:23:37 PM    Referred By:  SARAH           Confirmed By:Thai Burnham           ALLERGIES: Patient has no known allergies.      Current Facility-Administered Medications:   •  acetaminophen (TYLENOL) tablet 650 mg, 650 mg, Oral, Q4H PRN, Eduardo Bhandari MD  •  ALPRAZolam (XANAX) tablet 0.5 mg, 0.5 mg, Oral, TID PRN, Eduardo Bhandari MD  •  aluminum-magnesium hydroxide-simethicone (MAALOX MAX) 400-400-40 MG/5ML suspension 15 mL, 15 mL, Oral, Q6H PRN, Eduardo Bhandari MD  •  amLODIPine (NORVASC) tablet 10 mg, 10 mg, Oral, Q24H, Christiana Holland PA-C, 10 mg at  06/12/18 0907  •  aspirin chewable tablet 81 mg, 81 mg, Oral, Daily, Eduardo Bhandari MD, 81 mg at 06/12/18 0908  •  atenolol (TENORMIN) tablet 25 mg, 25 mg, Oral, Daily, Christiana Holland PA-C, 25 mg at 06/12/18 0908  •  benzonatate (TESSALON) capsule 100 mg, 100 mg, Oral, TID PRN, Eduardo Bhandari MD  •  benztropine (COGENTIN) tablet 1 mg, 1 mg, Oral, Daily PRN **OR** benztropine (COGENTIN) injection 0.5 mg, 0.5 mg, Intramuscular, Daily PRN, Eduardo Bhandari MD  •  bisacodyl (DULCOLAX) suppository 10 mg, 10 mg, Rectal, Daily, Beatris Velazquez MD  •  famotidine (PEPCID) tablet 20 mg, 20 mg, Oral, BID PRN, Eduardo Bhandari MD  •  FLUoxetine (PROzac) capsule 20 mg, 20 mg, Oral, Daily, Thomas De Leon MD, 20 mg at 06/12/18 0908  •  magnesium hydroxide (MILK OF MAGNESIA) suspension 2400 mg/10mL 10 mL, 10 mL, Oral, Daily PRN, Eduardo Bhandari MD  •  OLANZapine (zyPREXA) tablet 5 mg, 5 mg, Oral, BID, Thomas De Leon MD, 5 mg at 06/12/18 0908  •  OLANZapine zydis (zyPREXA) disintegrating tablet 5 mg, 5 mg, Oral, Q8H PRN, Eduardo Bhandari MD  •  ondansetron (ZOFRAN) tablet 4 mg, 4 mg, Oral, Q6H PRN, Eduardo Bhandari MD  •  pantoprazole (PROTONIX) EC tablet 40 mg, 40 mg, Oral, Q PM, Eduardo Bhandari MD, 40 mg at 06/11/18 1803  •  polyethylene glycol 3350 powder (packet), 17 g, Oral, Daily, Thomas Canela MD, 17 g at 06/11/18 0942  •  sennosides-docusate sodium (SENOKOT-S) 8.6-50 MG tablet 2 tablet, 2 tablet, Oral, BID, Christiana Holland PA-C, 2 tablet at 06/11/18 2034  •  sodium chloride (OCEAN) nasal spray 2 spray, 2 spray, Each Nare, PRN, Eduardo Bhandari MD  •  traZODone (DESYREL) tablet 50 mg, 50 mg, Oral, Nightly PRN, Eduardo Bhandari MD, 50 mg at 06/08/18 0052    ASSESSMENT & PLAN    Diagnosis   • Major depressive disorder with psychotic features Plan: To continue medications that include Prozac and Zyprexa, to discussed ECT as a treatment option more completely with the patient and his wife, to continue  supportive psychotherapeutic efforts individual and group.     • Depression with suicidal ideation Plan: Patient is on special precautions.     • HTN (hypertension) Plan: Hospitalist consultant evaluating patient's blood pressure medications prospectively.  Follow their recommendations            Suicide precautions: Suicide precaution Level 3 (q15 min checks)     Behavioral Health Treatment Plan and Problem List: I have reviewed and approved the Behavioral Health Treatment Plan and Problem list.    Clinician:  Thomas De Leon MD  06/12/18  10:15 AM    Dictated utilizing Dragon dictation

## 2018-06-13 PROBLEM — F32.3 SEVERE SINGLE CURRENT EPISODE OF MAJOR DEPRESSIVE DISORDER, WITH PSYCHOTIC FEATURES (HCC): Status: ACTIVE | Noted: 2018-06-07

## 2018-06-13 LAB
ANION GAP SERPL CALCULATED.3IONS-SCNC: 4.3 MMOL/L (ref 3.6–11.2)
BUN BLD-MCNC: 20 MG/DL (ref 7–21)
BUN/CREAT SERPL: 15.5 (ref 7–25)
CALCIUM SPEC-SCNC: 9.4 MG/DL (ref 7.7–10)
CHLORIDE SERPL-SCNC: 106 MMOL/L (ref 99–112)
CO2 SERPL-SCNC: 27.7 MMOL/L (ref 24.3–31.9)
CREAT BLD-MCNC: 1.29 MG/DL (ref 0.43–1.29)
GFR SERPL CREATININE-BSD FRML MDRD: 56 ML/MIN/1.73
GLUCOSE BLD-MCNC: 97 MG/DL (ref 70–110)
OSMOLALITY SERPL CALC.SUM OF ELEC: 278.2 MOSM/KG (ref 273–305)
POTASSIUM BLD-SCNC: 4 MMOL/L (ref 3.5–5.3)
SODIUM BLD-SCNC: 138 MMOL/L (ref 135–153)

## 2018-06-13 PROCEDURE — 99232 SBSQ HOSP IP/OBS MODERATE 35: CPT | Performed by: PSYCHIATRY & NEUROLOGY

## 2018-06-13 PROCEDURE — 99231 SBSQ HOSP IP/OBS SF/LOW 25: CPT | Performed by: INTERNAL MEDICINE

## 2018-06-13 PROCEDURE — 80048 BASIC METABOLIC PNL TOTAL CA: CPT | Performed by: INTERNAL MEDICINE

## 2018-06-13 RX ADMIN — PANTOPRAZOLE SODIUM 40 MG: 40 TABLET, DELAYED RELEASE ORAL at 16:50

## 2018-06-13 RX ADMIN — OLANZAPINE 5 MG: 5 TABLET, FILM COATED ORAL at 08:59

## 2018-06-13 RX ADMIN — FLUOXETINE 20 MG: 20 CAPSULE ORAL at 09:00

## 2018-06-13 RX ADMIN — OLANZAPINE 5 MG: 5 TABLET, FILM COATED ORAL at 21:19

## 2018-06-13 RX ADMIN — ASPIRIN 81 MG: 81 TABLET, CHEWABLE ORAL at 09:00

## 2018-06-13 RX ADMIN — ALPRAZOLAM 0.5 MG: 0.5 TABLET ORAL at 13:39

## 2018-06-13 RX ADMIN — ATENOLOL 50 MG: 50 TABLET ORAL at 08:59

## 2018-06-13 RX ADMIN — AMLODIPINE BESYLATE 10 MG: 10 TABLET ORAL at 09:00

## 2018-06-13 NOTE — PLAN OF CARE
Problem: Patient Care Overview  Goal: Plan of Care Review  Outcome: Ongoing (interventions implemented as appropriate)   06/13/18 0313   Coping/Psychosocial   Plan of Care Reviewed With patient   Coping/Psychosocial   Patient Agreement with Plan of Care agrees   Plan of Care Review   Progress no change   OTHER   Outcome Summary Pt denies anxiety depression SI HI hallucinations. Pt evasive during assessment.       Problem: Overarching Goals (Adult)  Goal: Adheres to Safety Considerations for Self and Others  Outcome: Ongoing (interventions implemented as appropriate)    Goal: Optimized Coping Skills in Response to Life Stressors  Outcome: Ongoing (interventions implemented as appropriate)    Goal: Develops/Participates in Therapeutic [place] to Support Successful Transition  Outcome: Ongoing (interventions implemented as appropriate)

## 2018-06-13 NOTE — PROGRESS NOTES
"INPATIENT PSYCHIATRIC PROGRESS NOTE    Name:  Ernesto Easley  :  1950  MRN:  [id number]  Visit Number:  [id number]  Length of stay:  6    Behavioral Health Treatment Plan and Problem List: I have reviewed and approved the Behavioral Health Treatment Plan and Problem list.    SUBJECTIVE  CC: \"Nothing will help\"    INTERVAL HISTORY: Met with patient and his family today discussing ECT as a treatment option, have covered risk-benefit issues, subsequently patient's son and the patient signed informed consent.  Once cleared medically by the hospitalist who are concerned about his blood pressure will proceed with treatment while continuing the Zyprexa Prozac medications.    Depression rating 10/10  Anxiety rating 10/10  Sleep: up once during the night.      Review of Systems   Respiratory: Negative.    Cardiovascular: Negative.    Gastrointestinal: Negative.    Musculoskeletal: Negative.    Neurological: Negative.          OBJECTIVE    Temp:  [96.8 °F (36 °C)-97.9 °F (36.6 °C)] 96.8 °F (36 °C)  Heart Rate:  [57-72] 72  Resp:  [18] 18  BP: (130-172)/() 172/90    MENTAL STATUS EXAM:      Appearance:Casually dressed, good hygeine.   Cooperation:Cooperative  Psychomotor: No psychomotor agitation/retardation, No EPS, No motor tics  Speech-normal rate, amount.   Mood/Affect: Depressed  Thought Processes: poverty of thought  Thought Content: dilussional, negativistic and distorted   Hallucination(s): none  Hopelessness: Yes  Optimistic:No  Suicidal Thoughts:  moderate  Suicidal Plan/Intent: none  Homicidal Thoughts:  absent  Orientation: oriented x 3  Memory: recent intact    Lab Results (last 24 hours)     Procedure Component Value Units Date/Time    Basic Metabolic Panel [[id number]]  (Abnormal) Collected:  18 0851    Specimen:  Blood Updated:  18     Glucose 97 mg/dL      BUN 20 mg/dL      Creatinine 1.29 mg/dL      Sodium 138 mmol/L      Potassium 4.0 mmol/L      Chloride 106 mmol/L      CO2 27.7 " mmol/L      Calcium 9.4 mg/dL      eGFR Non African Amer 56 (L) mL/min/1.73      BUN/Creatinine Ratio 15.5     Anion Gap 4.3 mmol/L     Narrative:       GFR Normal >60  Chronic Kidney Disease <60  Kidney Failure <15    Osmolality, Calculated [[id number]]  (Normal) Collected:  06/13/18 0851    Specimen:  Blood Updated:  06/13/18 0926     Osmolality Calc 278.2 mOsm/kg            Imaging Results (last 24 hours)     ** No results found for the last 24 hours. **           ECG/EMG Results (most recent)     Procedure Component Value Units Date/Time    ECG 12 Lead [[id number]] Collected:  06/07/18 1443     Updated:  06/07/18 2023    Narrative:       Test Reason : Potential adverse reaction to medications.  Blood Pressure : **/** mmHG  Vent. Rate : 056 BPM     Atrial Rate : 056 BPM     P-R Int : 150 ms          QRS Dur : 106 ms      QT Int : 412 ms       P-R-T Axes : 064 057 048 degrees     QTc Int : 397 ms    Sinus bradycardia  Otherwise normal ECG  No previous ECGs available  Confirmed by Thai Burnham ([id number]) on 6/7/2018 8:23:37 PM    Referred By:  SARAH           Confirmed By:Thai Burnham           ALLERGIES: Patient has no known allergies.      Current Facility-Administered Medications:   •  acetaminophen (TYLENOL) tablet 650 mg, 650 mg, Oral, Q4H PRN, Eduardo Bhandari MD  •  ALPRAZolam (XANAX) tablet 0.5 mg, 0.5 mg, Oral, TID PRN, Eduardo Bhandari MD  •  aluminum-magnesium hydroxide-simethicone (MAALOX MAX) 400-400-40 MG/5ML suspension 15 mL, 15 mL, Oral, Q6H PRN, Eduardo Bhandari MD  •  amLODIPine (NORVASC) tablet 10 mg, 10 mg, Oral, Q24H, Christiana Holland PA-C, 10 mg at 06/13/18 0900  •  aspirin chewable tablet 81 mg, 81 mg, Oral, Daily, Eduardo Bhandari MD, 81 mg at 06/13/18 0900  •  atenolol (TENORMIN) tablet 50 mg, 50 mg, Oral, Daily, Thomas De Leon MD, 50 mg at 06/13/18 0859  •  benzonatate (TESSALON) capsule 100 mg, 100 mg, Oral, TID PRN, Eduardo Bhandari MD  •  benztropine (COGENTIN) tablet 1 mg, 1 mg,  Oral, Daily PRN **OR** benztropine (COGENTIN) injection 0.5 mg, 0.5 mg, Intramuscular, Daily PRN, Eduardo Bhandari MD  •  bisacodyl (DULCOLAX) suppository 10 mg, 10 mg, Rectal, Daily, Beatris Velazquez MD  •  CloNIDine (CATAPRES) tablet 0.1 mg, 0.1 mg, Oral, Q8H PRN, Christiana Holland PA-C  •  famotidine (PEPCID) tablet 20 mg, 20 mg, Oral, BID PRN, Eduardo Bhandari MD  •  FLUoxetine (PROzac) capsule 20 mg, 20 mg, Oral, Daily, Thomas De Leon MD, 20 mg at 06/13/18 0900  •  magnesium hydroxide (MILK OF MAGNESIA) suspension 2400 mg/10mL 10 mL, 10 mL, Oral, Daily PRN, Eduardo Bhandari MD  •  OLANZapine (zyPREXA) tablet 5 mg, 5 mg, Oral, BID, Thomas De Leon MD, 5 mg at 06/13/18 0859  •  OLANZapine zydis (zyPREXA) disintegrating tablet 5 mg, 5 mg, Oral, Q8H PRN, Eduardo Bhandari MD  •  ondansetron (ZOFRAN) tablet 4 mg, 4 mg, Oral, Q6H PRN, Eduardo Bhandari MD  •  pantoprazole (PROTONIX) EC tablet 40 mg, 40 mg, Oral, Q PM, Eduardo Bhandari MD, 40 mg at 06/12/18 1650  •  polyethylene glycol 3350 powder (packet), 17 g, Oral, Daily, Thomas Canela MD, 17 g at 06/11/18 0942  •  sennosides-docusate sodium (SENOKOT-S) 8.6-50 MG tablet 2 tablet, 2 tablet, Oral, BID, Christiana Holland PA-C, 2 tablet at 06/11/18 2034  •  sodium chloride (OCEAN) nasal spray 2 spray, 2 spray, Each Nare, PRN, Eduardo Bhandari MD  •  traZODone (DESYREL) tablet 50 mg, 50 mg, Oral, Nightly PRN, Eduardo Bhandari MD, 50 mg at 06/08/18 0052    ASSESSMENT & PLAN    Major depressive disorder with psychotic features Plan: To continue medications that include Prozac and Zyprexa,  ECT once medically cleared by the hospitalist.   have discussed risks benefits issues with patient and family who have now given their consent to proceed.      Depression with suicidal ideation Plan: Patient is on special precautions.  •      HTN (hypertension) Plan: Hospitalist consultant evaluating patient's blood pressure medications prospectively.  Follow their  recommendations•             Suicide precautions: Suicide precaution Level 3 (q15 min checks)     Behavioral Health Treatment Plan and Problem List: I have reviewed and approved the Behavioral Health Treatment Plan and Problem list.    Clinician:  Thomas De Leon MD  06/13/18  12:56 PM    Dictated utilizing Dragon dictation

## 2018-06-13 NOTE — NURSING NOTE
When I attempted to do the SLUMS and BDI, patient stated he didn't feel up to answering questions at this time.

## 2018-06-13 NOTE — PROGRESS NOTES
"0945  Met with patients family as well as with Dr. De Leon he was discussing risks and benefits of ECT as well as to educate family regarding the ECT procedure. Patients family visited with the patient and encouraged him to agree to the ECT treatment, he signed consent and had time to discuss with his family. They are very supportive and appropriately concerned.     Patient continues to say \"nothing is going to help me, I am dying of cancer\". Patient continues to say he has cancer and he knows this to be true, he is focused  on other patients avoiding him he thinks he smells bad. Reassured that there is no tests that have done that show he has cancer and reassured him that he does not have any body odor. Patient tells his wife that his hemorids are very large and painful. This has been discussed with Dr. De Leon. Patient continues to feel hopeless and thinks treatment is not helping. Discussed talking to the  Freddy and he was agreeable. Discussed with Freddy SEQUEIRA Patients current situation and he agreed to meet with the patient. Patient rates depression and anxiety at 10/10.     Continue to stabilize patients symptoms, provide individual and group therapy to focus on healthy coping and being compliant with treatment recommendations and follow up care.   "

## 2018-06-13 NOTE — PROGRESS NOTES
Patient Identification:  Name:  Ernesto Easley  Age:  67 y.o.  Sex:  male  :  1950  MRN:  [id number]  Visit Number:  [id number]  Primary Care Provider:  GLO Huston    Length of stay:  6    67-year-old  male admitted to the [facility] for major depression and psychosis. Hospitalist service initially consulted for low blood pressures.  He was also found to have mild acute on chronic stage III renal failure.   Consult appears to have been placed again for ECT clearance.      Subjective:      Mr. Easley is evaluated while sitting on the side of his bed. HE denies chest pain, dyspnea, and palpitations.  He tells me he did well with his EGD last month with Dr. Greenwood but was told he had an ulcer.  Since that time, he has reportedly told his family he has cancer and feels that he is dying. To the knowledge of his family and current staff, Mr. Easley does not have known malignancy and has been reassured of this.      ----------------------------------------------------------------------------------------------------------------------  Current Hospital Meds:    amLODIPine 10 mg Oral Q24H   aspirin 81 mg Oral Daily   atenolol 50 mg Oral Daily   bisacodyl 10 mg Rectal Daily   FLUoxetine 20 mg Oral Daily   OLANZapine 5 mg Oral BID   pantoprazole 40 mg Oral Q PM   polyethylene glycol 17 g Oral Daily   sennosides-docusate sodium 2 tablet Oral BID        ----------------------------------------------------------------------------------------------------------------------  Vital Signs:  Temp:  [96.8 °F (36 °C)-97.9 °F (36.6 °C)] 96.8 °F (36 °C)  Heart Rate:  [57-72] 72  Resp:  [18] 18  BP: (130-172)/() 172/90  1    18  1040   Weight: 80.5 kg (177 lb 6.4 oz)     Body mass index is 26.97 kg/m².  No intake or output data in the 24 hours ending 18 1347  No intake/output data recorded.  Diet Regular; Low Sodium; 2,000 mg  Na  ----------------------------------------------------------------------------------------------------------------------  Physical exam:  Constitutional:  Well-developed and well-nourished.  No respiratory distress.    Current BP 120s /80s to my exam  HENT:  Head:  Normocephalic and atraumatic.  Mouth:  Moist mucous membranes.    Eyes:  Conjunctivae and EOM are normal.  Pupils are equal, round, and reactive to light.  No scleral icterus.    Neck:  Neck supple.  No JVD present.    Cardiovascular:  Normal rate, regular rhythm and normal heart sounds with no murmur.  Pulmonary/Chest:  No respiratory distress, no wheezes, no crackles, with normal breath sounds and good air movement.  Abdominal:  Soft.  Bowel sounds are normal.  No distension and no tenderness.   Musculoskeletal:  No edema, no tenderness, and no deformity.  No red or swollen joints anywhere.    Neurological:  Alert and oriented to person, place, and time.  No cranial nerve deficit.  No tongue deviation.  No facial droop.  No slurred speech.   Skin:  Skin is warm and dry. No rash noted. No pallor.   ----------------------------------------------------------------------------------------------------------------------  ----------------------------------------------------------------------------------------------------------------------        Results from last 7 days  Lab Units 06/12/18  0448 06/09/18  1355 06/07/18  0843   WBC 10*3/mm3 7.81 9.36 9.67   HEMOGLOBIN g/dL 14.8 15.4 16.4   HEMATOCRIT % 42.6 43.2 45.2   MCV fL 92.2 92.1 89.0   MCHC g/dL 34.7 35.6 36.3   PLATELETS 10*3/mm3 276 329 360           Results from last 7 days  Lab Units 06/13/18  0851 06/12/18  0448 06/11/18  0514  06/07/18  0843   SODIUM mmol/L 138 140 137  < > 136   POTASSIUM mmol/L 4.0 3.7 3.7  < > 3.8   CHLORIDE mmol/L 106 105 106  < > 104   CO2 mmol/L 27.7 26.9 28.0  < > 26.1   BUN mg/dL 20 17 16  < > 16   CREATININE mg/dL 1.29 1.30* 1.11  < > 1.21   EGFR IF NONAFRICN AM  mL/min/1.73 56* 55* 66  < > 60*   CALCIUM mg/dL 9.4 9.4 9.3  < > 9.8   GLUCOSE mg/dL 97 93 94  < > 111*   ALBUMIN g/dL  --   --   --   --  4.60   BILIRUBIN mg/dL  --   --   --   --  1.2   ALK PHOS U/L  --   --   --   --  64   AST (SGOT) U/L  --   --   --   --  24   ALT (SGPT) U/L  --   --   --   --  25   < > = values in this interval not displayed.Estimated Creatinine Clearance: 65 mL/min (by C-G formula based on SCr of 1.29 mg/dL).  No results found for: AMMONIA    Results from last 7 days  Lab Units 06/09/18  0757   CHOLESTEROL mg/dL 178   TRIGLYCERIDES mg/dL 119   HDL CHOL mg/dL 39*   LDL CHOL mg/dL 115*                 ----------------------------------------------------------------------------------------------------------------------  Imaging Results (last 24 hours)     ** No results found for the last 24 hours. **        ----------------------------------------------------------------------------------------------------------------------  Assessment and Plan:    -Pre-ECT clearance: CT head unremarkable. Thyroid studies unremarkable.  EKG unremarkable. Wrist x-ray reviewed and negative.  No prior reported seizures or known difficulty with general anesthesia.  Blood pressures have improved.  Mr. Easley is considered acceptable risk for general anesthesia. Discussed additionally with Dr. Espinal.      -Hypertension, improving: Will continue Norvasc 10mg daily.  Atenolol was increased yesterday by Dr. De Leon to 50mg.  PRN Clonidine 0.1mg TID added for systolic bp>160mmHg.  Vital sign frequency has been changed to q8 hours to ensure that Mr. Easley is receiving his vitals more frequently than shift change given requirement for PRN  Blood pressure medications.      -Acute on likely chronic renal failure stage III:  Creatinine essentially unchanged.  Please see prior progress notes regarding patient's knowledge of wandering creatinine. Records requests have been placed with PCP's office for last labs and discussed  with the office by staff; however, there are currently no prior labs available.   Would advise to continue to hold nephrotoxins.     -Recent ulcer per patient account:  Protonix continued. Recent reported H.pylori with completion of antibiotics.  EGD report and pathology report requested, as patient has reported been more depressed since procedure.      Major depression with psychosis:  Care per primary team. Pathology report from EGD requested from Dr. Greenwood office.      (I have interviewed and examined the patient, I discussed this case with the attending physician Dr. De Leon as well as the physician assistant.  I concur with the above note, patient should be except will risk for general anesthesia for ECT.)    Christiana Holland PA-C  06/13/18  1:47 PM

## 2018-06-13 NOTE — PLAN OF CARE
Problem: Patient Care Overview  Goal: Plan of Care Review  Outcome: Ongoing (interventions implemented as appropriate)   06/13/18 1549   Coping/Psychosocial   Plan of Care Reviewed With patient   Coping/Psychosocial   Patient Agreement with Plan of Care agrees   Plan of Care Review   Progress no change   OTHER   Outcome Summary Avoids social contact. Had family session today. Evasive. Scheduled for ect ryan.       Problem: Overarching Goals (Adult)  Goal: Adheres to Safety Considerations for Self and Others  Outcome: Ongoing (interventions implemented as appropriate)    Goal: Optimized Coping Skills in Response to Life Stressors  Outcome: Ongoing (interventions implemented as appropriate)    Goal: Develops/Participates in Therapeutic [place] to Support Successful Transition  Outcome: Ongoing (interventions implemented as appropriate)      Problem: Fall Risk (Adult)  Goal: Identify Related Risk Factors and Signs and Symptoms  Outcome: Ongoing (interventions implemented as appropriate)    Goal: Absence of Fall  Outcome: Ongoing (interventions implemented as appropriate)

## 2018-06-14 ENCOUNTER — ANESTHESIA (OUTPATIENT)
Dept: PERIOP | Facility: HOSPITAL | Age: 68
End: 2018-06-14

## 2018-06-14 ENCOUNTER — ANESTHESIA EVENT (OUTPATIENT)
Dept: PERIOP | Facility: HOSPITAL | Age: 68
End: 2018-06-14

## 2018-06-14 LAB
ANION GAP SERPL CALCULATED.3IONS-SCNC: 8.3 MMOL/L (ref 3.6–11.2)
BUN BLD-MCNC: 20 MG/DL (ref 7–21)
BUN/CREAT SERPL: 16 (ref 7–25)
CALCIUM SPEC-SCNC: 9.2 MG/DL (ref 7.7–10)
CHLORIDE SERPL-SCNC: 105 MMOL/L (ref 99–112)
CO2 SERPL-SCNC: 28.7 MMOL/L (ref 24.3–31.9)
CREAT BLD-MCNC: 1.25 MG/DL (ref 0.43–1.29)
GFR SERPL CREATININE-BSD FRML MDRD: 58 ML/MIN/1.73
GLUCOSE BLD-MCNC: 92 MG/DL (ref 70–110)
OSMOLALITY SERPL CALC.SUM OF ELEC: 285.4 MOSM/KG (ref 273–305)
POTASSIUM BLD-SCNC: 4.1 MMOL/L (ref 3.5–5.3)
SODIUM BLD-SCNC: 142 MMOL/L (ref 135–153)

## 2018-06-14 PROCEDURE — 80048 BASIC METABOLIC PNL TOTAL CA: CPT | Performed by: PHYSICIAN ASSISTANT

## 2018-06-14 PROCEDURE — 25010000002 SUCCINYLCHOLINE PER 20 MG: Performed by: NURSE ANESTHETIST, CERTIFIED REGISTERED

## 2018-06-14 PROCEDURE — 25010000002 MIDAZOLAM PER 1 MG: Performed by: NURSE ANESTHETIST, CERTIFIED REGISTERED

## 2018-06-14 PROCEDURE — 25010000002 FENTANYL CITRATE (PF) 100 MCG/2ML SOLUTION: Performed by: NURSE ANESTHETIST, CERTIFIED REGISTERED

## 2018-06-14 PROCEDURE — 90870 ELECTROCONVULSIVE THERAPY: CPT | Performed by: PSYCHIATRY & NEUROLOGY

## 2018-06-14 PROCEDURE — GZB2ZZZ ELECTROCONVULSIVE THERAPY, BILATERAL-SINGLE SEIZURE: ICD-10-PCS | Performed by: PSYCHIATRY & NEUROLOGY

## 2018-06-14 PROCEDURE — 25010000002 ONDANSETRON PER 1 MG: Performed by: NURSE ANESTHETIST, CERTIFIED REGISTERED

## 2018-06-14 RX ORDER — FENTANYL CITRATE 50 UG/ML
50 INJECTION, SOLUTION INTRAMUSCULAR; INTRAVENOUS
Status: DISCONTINUED | OUTPATIENT
Start: 2018-06-14 | End: 2018-06-14 | Stop reason: HOSPADM

## 2018-06-14 RX ORDER — MIDAZOLAM HYDROCHLORIDE 1 MG/ML
1 INJECTION INTRAMUSCULAR; INTRAVENOUS
Status: DISCONTINUED | OUTPATIENT
Start: 2018-06-14 | End: 2018-06-14 | Stop reason: HOSPADM

## 2018-06-14 RX ORDER — ONDANSETRON 2 MG/ML
4 INJECTION INTRAMUSCULAR; INTRAVENOUS ONCE AS NEEDED
Status: DISCONTINUED | OUTPATIENT
Start: 2018-06-14 | End: 2018-06-14 | Stop reason: HOSPADM

## 2018-06-14 RX ORDER — SODIUM CHLORIDE 0.9 % (FLUSH) 0.9 %
1-10 SYRINGE (ML) INJECTION AS NEEDED
Status: DISCONTINUED | OUTPATIENT
Start: 2018-06-14 | End: 2018-06-20 | Stop reason: HOSPADM

## 2018-06-14 RX ORDER — SUCCINYLCHOLINE CHLORIDE 20 MG/ML
INJECTION INTRAMUSCULAR; INTRAVENOUS AS NEEDED
Status: DISCONTINUED | OUTPATIENT
Start: 2018-06-14 | End: 2018-06-14 | Stop reason: SURG

## 2018-06-14 RX ORDER — SODIUM CHLORIDE, SODIUM LACTATE, POTASSIUM CHLORIDE, CALCIUM CHLORIDE 600; 310; 30; 20 MG/100ML; MG/100ML; MG/100ML; MG/100ML
INJECTION, SOLUTION INTRAVENOUS CONTINUOUS PRN
Status: DISCONTINUED | OUTPATIENT
Start: 2018-06-14 | End: 2018-06-14 | Stop reason: SURG

## 2018-06-14 RX ORDER — ONDANSETRON 2 MG/ML
INJECTION INTRAMUSCULAR; INTRAVENOUS AS NEEDED
Status: DISCONTINUED | OUTPATIENT
Start: 2018-06-14 | End: 2018-06-14 | Stop reason: SURG

## 2018-06-14 RX ORDER — MIDAZOLAM HYDROCHLORIDE 1 MG/ML
2 INJECTION INTRAMUSCULAR; INTRAVENOUS
Status: DISCONTINUED | OUTPATIENT
Start: 2018-06-14 | End: 2018-06-14 | Stop reason: HOSPADM

## 2018-06-14 RX ORDER — SODIUM CHLORIDE, SODIUM LACTATE, POTASSIUM CHLORIDE, CALCIUM CHLORIDE 600; 310; 30; 20 MG/100ML; MG/100ML; MG/100ML; MG/100ML
125 INJECTION, SOLUTION INTRAVENOUS CONTINUOUS
Status: DISCONTINUED | OUTPATIENT
Start: 2018-06-14 | End: 2018-06-14

## 2018-06-14 RX ORDER — OXYCODONE HYDROCHLORIDE AND ACETAMINOPHEN 5; 325 MG/1; MG/1
1 TABLET ORAL ONCE AS NEEDED
Status: DISCONTINUED | OUTPATIENT
Start: 2018-06-14 | End: 2018-06-14 | Stop reason: HOSPADM

## 2018-06-14 RX ORDER — FENTANYL CITRATE 50 UG/ML
INJECTION, SOLUTION INTRAMUSCULAR; INTRAVENOUS AS NEEDED
Status: DISCONTINUED | OUTPATIENT
Start: 2018-06-14 | End: 2018-06-14 | Stop reason: SURG

## 2018-06-14 RX ORDER — MEPERIDINE HYDROCHLORIDE 50 MG/ML
12.5 INJECTION INTRAMUSCULAR; INTRAVENOUS; SUBCUTANEOUS
Status: DISCONTINUED | OUTPATIENT
Start: 2018-06-14 | End: 2018-06-14 | Stop reason: HOSPADM

## 2018-06-14 RX ORDER — GLYCOPYRROLATE 0.2 MG/ML
INJECTION INTRAMUSCULAR; INTRAVENOUS AS NEEDED
Status: DISCONTINUED | OUTPATIENT
Start: 2018-06-14 | End: 2018-06-14 | Stop reason: SURG

## 2018-06-14 RX ORDER — MIDAZOLAM HYDROCHLORIDE 1 MG/ML
INJECTION INTRAMUSCULAR; INTRAVENOUS AS NEEDED
Status: DISCONTINUED | OUTPATIENT
Start: 2018-06-14 | End: 2018-06-14 | Stop reason: SURG

## 2018-06-14 RX ORDER — ATENOLOL 25 MG/1
25 TABLET ORAL DAILY
Status: DISCONTINUED | OUTPATIENT
Start: 2018-06-15 | End: 2018-06-20 | Stop reason: HOSPADM

## 2018-06-14 RX ORDER — SODIUM CHLORIDE 0.9 % (FLUSH) 0.9 %
1-10 SYRINGE (ML) INJECTION AS NEEDED
Status: DISCONTINUED | OUTPATIENT
Start: 2018-06-14 | End: 2018-06-14 | Stop reason: HOSPADM

## 2018-06-14 RX ORDER — IPRATROPIUM BROMIDE AND ALBUTEROL SULFATE 2.5; .5 MG/3ML; MG/3ML
3 SOLUTION RESPIRATORY (INHALATION) ONCE AS NEEDED
Status: DISCONTINUED | OUTPATIENT
Start: 2018-06-14 | End: 2018-06-14 | Stop reason: HOSPADM

## 2018-06-14 RX ADMIN — OLANZAPINE 5 MG: 5 TABLET, FILM COATED ORAL at 07:43

## 2018-06-14 RX ADMIN — GLYCOPYRROLATE 0.2 MG: 0.2 INJECTION, SOLUTION INTRAMUSCULAR; INTRAVENOUS at 10:38

## 2018-06-14 RX ADMIN — METHOHEXITAL SODIUM 100 MG: 500 INJECTION, POWDER, LYOPHILIZED, FOR SOLUTION INTRAMUSCULAR; INTRAVENOUS; RECTAL at 10:34

## 2018-06-14 RX ADMIN — ATENOLOL 50 MG: 50 TABLET ORAL at 07:44

## 2018-06-14 RX ADMIN — SODIUM CHLORIDE, POTASSIUM CHLORIDE, SODIUM LACTATE AND CALCIUM CHLORIDE 125 ML/HR: 600; 310; 30; 20 INJECTION, SOLUTION INTRAVENOUS at 09:21

## 2018-06-14 RX ADMIN — FLUOXETINE 20 MG: 20 CAPSULE ORAL at 07:43

## 2018-06-14 RX ADMIN — ONDANSETRON 4 MG: 2 INJECTION, SOLUTION INTRAMUSCULAR; INTRAVENOUS at 10:38

## 2018-06-14 RX ADMIN — ASPIRIN 81 MG: 81 TABLET, CHEWABLE ORAL at 07:43

## 2018-06-14 RX ADMIN — SUCCINYLCHOLINE CHLORIDE 100 MG: 20 INJECTION, SOLUTION INTRAMUSCULAR; INTRAVENOUS at 10:34

## 2018-06-14 RX ADMIN — PANTOPRAZOLE SODIUM 40 MG: 40 TABLET, DELAYED RELEASE ORAL at 16:23

## 2018-06-14 RX ADMIN — MIDAZOLAM HYDROCHLORIDE 2 MG: 1 INJECTION, SOLUTION INTRAMUSCULAR; INTRAVENOUS at 10:38

## 2018-06-14 RX ADMIN — OLANZAPINE 5 MG: 5 TABLET, FILM COATED ORAL at 21:00

## 2018-06-14 RX ADMIN — AMLODIPINE BESYLATE 10 MG: 10 TABLET ORAL at 07:44

## 2018-06-14 RX ADMIN — DOCUSATE SODIUM AND SENNOSIDES 2 TABLET: 8.6; 5 TABLET, FILM COATED ORAL at 21:00

## 2018-06-14 RX ADMIN — SODIUM CHLORIDE, POTASSIUM CHLORIDE, SODIUM LACTATE AND CALCIUM CHLORIDE: 600; 310; 30; 20 INJECTION, SOLUTION INTRAVENOUS at 10:22

## 2018-06-14 RX ADMIN — FENTANYL CITRATE 50 MCG: 50 INJECTION INTRAMUSCULAR; INTRAVENOUS at 10:46

## 2018-06-14 NOTE — PROGRESS NOTES
Chart reviewed. Creatinine remains essentially unchanged.  Blood pressures appear controlled on this date. Discussed with psychiatric staff and outpatient labs that were previously requested have still not been sent by PCP.  This was previously discussed with PCPs office. Suspect present creatinine is Mr. Easley's baseline.  Given documented heart rates in the 50s and mild hypotension this morning, will decrease Atenolol to 25 mg daily.

## 2018-06-14 NOTE — ANESTHESIA POSTPROCEDURE EVALUATION
Patient: Ernesto Easley    Procedure Summary     Date:  06/14/18 Room / Location:  [facility] OR  /  [facility] OR    Anesthesia Start:  1022 Anesthesia Stop:  1051    Procedure:  ELECTROCONVULSIVE THERAPY (Bilateral ) Diagnosis:       Severe single current episode of major depressive disorder, with psychotic features      (Severe single current episode of major depressive disorder, with psychotic features [F32.3])    Surgeon:  Thomas De Leon MD Provider:  Jn Edge MD    Anesthesia Type:  general ASA Status:  2          Anesthesia Type: general  Last vitals  BP   134/76 (06/14/18 0916)   Temp   98.7 °F (37.1 °C) (06/14/18 0916)   Pulse   83 (06/14/18 0916)   Resp   16 (06/14/18 0916)     SpO2   96 % (06/14/18 0916)     Post Anesthesia Care and Evaluation    Patient location during evaluation: PHASE II  Patient participation: complete - patient participated  Level of consciousness: awake and alert  Pain score: 1  Pain management: adequate  Airway patency: patent  Anesthetic complications: No anesthetic complications  PONV Status: controlled  Cardiovascular status: acceptable  Respiratory status: acceptable  Hydration status: acceptable

## 2018-06-14 NOTE — PLAN OF CARE
Problem: Patient Care Overview  Goal: Plan of Care Review  Outcome: Ongoing (interventions implemented as appropriate)   06/14/18 0058   Coping/Psychosocial   Plan of Care Reviewed With patient   Coping/Psychosocial   Patient Agreement with Plan of Care agrees   Plan of Care Review   Progress no change   OTHER   Outcome Summary Pt continues to isolate to room. Denies anxiety depression SI HI hallucinations.       Problem: Overarching Goals (Adult)  Goal: Adheres to Safety Considerations for Self and Others  Outcome: Ongoing (interventions implemented as appropriate)    Goal: Optimized Coping Skills in Response to Life Stressors  Outcome: Ongoing (interventions implemented as appropriate)    Goal: Develops/Participates in Therapeutic [place] to Support Successful Transition  Outcome: Ongoing (interventions implemented as appropriate)

## 2018-06-14 NOTE — OP NOTE
ECT OPERATIVE PROCEDURE REPORT      PATIENT NAME: Ernesto Easley    Assistants: None    Primary Surgeon: JEAN PAUL De Leon MD    Pre-Op Diagnosis:   Major Depression, Single Episode, Severe Without Psychosis    Post- Operative Diagnosis: Same  : 1950    SSN:     MRN#: [id number]    PHYSICIAN: JEAN PAUL DE LEON M.D.    PROCEDURE DATE: 18    PROCEDURE: Bifrontal ECT utilizing Spectrum 5000 q. Machine. Settings at **0.5* ms, 90Hz, 1.75 seconds, and 800 mA. This is the patient's 1 Tx in this series.    ANESTHESIA: See anesthesia report for medications and monitoring.                           Brevital:100        mg          Succinylcholine: 100        mg    DETAILS: Impulse at 10.34 with a resultant 45 second seizure.    Specimens Removed: NA  Blood Administered: NA    Complications: None    Pt STATUS STABLE: 1045  HR    Grafts or Implants: NA    Dictated utilizing Dragon dictation

## 2018-06-14 NOTE — ANESTHESIA PREPROCEDURE EVALUATION
Anesthesia Evaluation     no history of anesthetic complications:  NPO Solid Status: > 8 hours  NPO Liquid Status: > 8 hours           Airway   Mallampati: II  TM distance: >3 FB  Neck ROM: full  No difficulty expected  Dental    (+) upper dentures    Pulmonary - normal exam   Cardiovascular - normal exam    (+) hypertension,       Neuro/Psych  (+) psychiatric history Depression,     GI/Hepatic/Renal/Endo      Musculoskeletal     Abdominal  - normal exam   Substance History      OB/GYN          Other                        Anesthesia Plan    ASA 2     general     intravenous induction   Anesthetic plan and risks discussed with patient.

## 2018-06-14 NOTE — PROGRESS NOTES
"[phone]  D: Spoke with the patient briefly in the hallway outside his room.  The patient reported he was doing \"fair\" today.  He complained that being here felt like he was in penitentiary.  He reported believing people were talking about him and even laughing at him.  He complained of the noises on the unit, saying that there was always a door slamming or toilet flushing, and described it as \"torture.\"    A: The patient's affect appeared irritable.  He seemed preoccupied with leaving the hospital at this time.    P: The patient will continue treatment.  "

## 2018-06-15 ENCOUNTER — ANESTHESIA EVENT (OUTPATIENT)
Dept: PERIOP | Facility: HOSPITAL | Age: 68
End: 2018-06-15

## 2018-06-15 ENCOUNTER — ANESTHESIA (OUTPATIENT)
Dept: PERIOP | Facility: HOSPITAL | Age: 68
End: 2018-06-15

## 2018-06-15 LAB
ANION GAP SERPL CALCULATED.3IONS-SCNC: 5.4 MMOL/L (ref 3.6–11.2)
BUN BLD-MCNC: 17 MG/DL (ref 7–21)
BUN/CREAT SERPL: 13 (ref 7–25)
CALCIUM SPEC-SCNC: 8.9 MG/DL (ref 7.7–10)
CHLORIDE SERPL-SCNC: 107 MMOL/L (ref 99–112)
CO2 SERPL-SCNC: 28.6 MMOL/L (ref 24.3–31.9)
CREAT BLD-MCNC: 1.31 MG/DL (ref 0.43–1.29)
GFR SERPL CREATININE-BSD FRML MDRD: 55 ML/MIN/1.73
GLUCOSE BLD-MCNC: 89 MG/DL (ref 70–110)
OSMOLALITY SERPL CALC.SUM OF ELEC: 282.3 MOSM/KG (ref 273–305)
POTASSIUM BLD-SCNC: 3.7 MMOL/L (ref 3.5–5.3)
SODIUM BLD-SCNC: 141 MMOL/L (ref 135–153)

## 2018-06-15 PROCEDURE — 90870 ELECTROCONVULSIVE THERAPY: CPT | Performed by: PSYCHIATRY & NEUROLOGY

## 2018-06-15 PROCEDURE — 25010000002 SUCCINYLCHOLINE PER 20 MG: Performed by: NURSE ANESTHETIST, CERTIFIED REGISTERED

## 2018-06-15 PROCEDURE — 93010 ELECTROCARDIOGRAM REPORT: CPT | Performed by: INTERNAL MEDICINE

## 2018-06-15 PROCEDURE — GZB2ZZZ ELECTROCONVULSIVE THERAPY, BILATERAL-SINGLE SEIZURE: ICD-10-PCS | Performed by: PSYCHIATRY & NEUROLOGY

## 2018-06-15 PROCEDURE — 80048 BASIC METABOLIC PNL TOTAL CA: CPT | Performed by: PHYSICIAN ASSISTANT

## 2018-06-15 PROCEDURE — 93005 ELECTROCARDIOGRAM TRACING: CPT | Performed by: ANESTHESIOLOGY

## 2018-06-15 RX ORDER — GLYCOPYRROLATE 0.2 MG/ML
0.1 INJECTION INTRAMUSCULAR; INTRAVENOUS ONCE
Status: DISCONTINUED | OUTPATIENT
Start: 2018-06-15 | End: 2018-06-15

## 2018-06-15 RX ORDER — SODIUM CHLORIDE, SODIUM LACTATE, POTASSIUM CHLORIDE, CALCIUM CHLORIDE 600; 310; 30; 20 MG/100ML; MG/100ML; MG/100ML; MG/100ML
125 INJECTION, SOLUTION INTRAVENOUS CONTINUOUS
Status: DISCONTINUED | OUTPATIENT
Start: 2018-06-15 | End: 2018-06-15

## 2018-06-15 RX ORDER — ONDANSETRON 2 MG/ML
4 INJECTION INTRAMUSCULAR; INTRAVENOUS ONCE AS NEEDED
Status: DISCONTINUED | OUTPATIENT
Start: 2018-06-15 | End: 2018-06-19 | Stop reason: HOSPADM

## 2018-06-15 RX ORDER — IPRATROPIUM BROMIDE AND ALBUTEROL SULFATE 2.5; .5 MG/3ML; MG/3ML
3 SOLUTION RESPIRATORY (INHALATION) ONCE AS NEEDED
Status: DISCONTINUED | OUTPATIENT
Start: 2018-06-15 | End: 2018-06-19 | Stop reason: HOSPADM

## 2018-06-15 RX ORDER — SODIUM CHLORIDE 0.9 % (FLUSH) 0.9 %
1-10 SYRINGE (ML) INJECTION AS NEEDED
Status: DISCONTINUED | OUTPATIENT
Start: 2018-06-15 | End: 2018-06-20 | Stop reason: HOSPADM

## 2018-06-15 RX ORDER — SUCCINYLCHOLINE CHLORIDE 20 MG/ML
INJECTION INTRAMUSCULAR; INTRAVENOUS AS NEEDED
Status: DISCONTINUED | OUTPATIENT
Start: 2018-06-15 | End: 2018-06-15 | Stop reason: SURG

## 2018-06-15 RX ORDER — SODIUM CHLORIDE 0.9 % (FLUSH) 0.9 %
1-10 SYRINGE (ML) INJECTION AS NEEDED
Status: DISCONTINUED | OUTPATIENT
Start: 2018-06-15 | End: 2018-06-15 | Stop reason: HOSPADM

## 2018-06-15 RX ORDER — GLYCOPYRROLATE 0.2 MG/ML
0.4 INJECTION INTRAMUSCULAR; INTRAVENOUS ONCE
Status: COMPLETED | OUTPATIENT
Start: 2018-06-15 | End: 2018-06-15

## 2018-06-15 RX ADMIN — ATENOLOL 25 MG: 25 TABLET ORAL at 07:56

## 2018-06-15 RX ADMIN — CLONIDINE HYDROCHLORIDE 0.1 MG: 0.1 TABLET ORAL at 11:05

## 2018-06-15 RX ADMIN — PANTOPRAZOLE SODIUM 40 MG: 40 TABLET, DELAYED RELEASE ORAL at 16:50

## 2018-06-15 RX ADMIN — ASPIRIN 81 MG: 81 TABLET, CHEWABLE ORAL at 11:05

## 2018-06-15 RX ADMIN — ACETAMINOPHEN 650 MG: 325 TABLET ORAL at 11:05

## 2018-06-15 RX ADMIN — METHOHEXITAL SODIUM 100 MG: 500 INJECTION, POWDER, LYOPHILIZED, FOR SOLUTION INTRAMUSCULAR; INTRAVENOUS; RECTAL at 09:38

## 2018-06-15 RX ADMIN — AMLODIPINE BESYLATE 10 MG: 10 TABLET ORAL at 07:55

## 2018-06-15 RX ADMIN — OLANZAPINE 5 MG: 5 TABLET, FILM COATED ORAL at 11:05

## 2018-06-15 RX ADMIN — OLANZAPINE 7.5 MG: 2.5 TABLET, FILM COATED ORAL at 21:48

## 2018-06-15 RX ADMIN — SUCCINYLCHOLINE CHLORIDE 100 MG: 20 INJECTION, SOLUTION INTRAMUSCULAR; INTRAVENOUS at 09:38

## 2018-06-15 RX ADMIN — DOCUSATE SODIUM AND SENNOSIDES 2 TABLET: 8.6; 5 TABLET, FILM COATED ORAL at 21:48

## 2018-06-15 RX ADMIN — FLUOXETINE 20 MG: 20 CAPSULE ORAL at 11:05

## 2018-06-15 RX ADMIN — GLYCOPYRROLATE 0.4 MG: 0.2 INJECTION, SOLUTION INTRAMUSCULAR; INTRAVENOUS at 10:13

## 2018-06-15 RX ADMIN — SODIUM CHLORIDE, POTASSIUM CHLORIDE, SODIUM LACTATE AND CALCIUM CHLORIDE: 600; 310; 30; 20 INJECTION, SOLUTION INTRAVENOUS at 09:24

## 2018-06-15 NOTE — PLAN OF CARE
Problem: Patient Care Overview  Goal: Plan of Care Review  Outcome: Ongoing (interventions implemented as appropriate)  Rates anxiety 10 and depression 10. Denies suicidal thoughts.   Goal: Individualization and Mutuality  Outcome: Ongoing (interventions implemented as appropriate)    Goal: Discharge Needs Assessment  Outcome: Ongoing (interventions implemented as appropriate)    Goal: Interprofessional Rounds/Family Conf  Outcome: Ongoing (interventions implemented as appropriate)      Problem: Overarching Goals (Adult)  Goal: Adheres to Safety Considerations for Self and Others  Outcome: Ongoing (interventions implemented as appropriate)    Goal: Optimized Coping Skills in Response to Life Stressors  Outcome: Ongoing (interventions implemented as appropriate)    Goal: Develops/Participates in Therapeutic [place] to Support Successful Transition  Outcome: Ongoing (interventions implemented as appropriate)      Problem: Mood Impairment (Depressive Signs/Symptoms) (Adult)  Goal: Improved Mood Symptoms (Depressive Signs/Symptoms)  Outcome: Ongoing (interventions implemented as appropriate)      Problem: Mood Impairment (Anxiety Signs/Symptoms) (Adult)  Goal: Improved Mood Symptoms (Anxiety Signs/Symptoms)  Outcome: Ongoing (interventions implemented as appropriate)      Problem: Suicidal Behavior (Adult)  Goal: Suicidal Behavior is Absent/Minimized/Managed  Outcome: Ongoing (interventions implemented as appropriate)      Problem: Fall Risk (Adult)  Goal: Identify Related Risk Factors and Signs and Symptoms  Outcome: Ongoing (interventions implemented as appropriate)    Goal: Absence of Fall  Outcome: Ongoing (interventions implemented as appropriate)      Problem: Nutrition, Imbalanced: Inadequate Oral Intake (Adult)  Goal: Identify Related Risk Factors and Signs and Symptoms  Outcome: Ongoing (interventions implemented as appropriate)    Goal: Improved Oral Intake  Outcome: Ongoing (interventions implemented as  appropriate)    Goal: Prevent Further Weight Loss  Outcome: Ongoing (interventions implemented as appropriate)      Problem: Procedure Safety and Recovery (ECT) (Adult)  Goal: Optimal Procedural Recovery  Outcome: Ongoing (interventions implemented as appropriate)      Problem: Patient Care Overview  Goal: Plan of Care Review  Outcome: Ongoing (interventions implemented as appropriate)    Goal: Individualization and Mutuality  Outcome: Ongoing (interventions implemented as appropriate)    Goal: Discharge Needs Assessment  Outcome: Ongoing (interventions implemented as appropriate)    Goal: Interprofessional Rounds/Family Conf  Outcome: Ongoing (interventions implemented as appropriate)

## 2018-06-15 NOTE — ANESTHESIA POSTPROCEDURE EVALUATION
Patient: Ernesto Easley    Procedure Summary     Date:  06/15/18 Room / Location:  [facility] OR  /  [facility] OR    Anesthesia Start:  0924 Anesthesia Stop:  0946    Procedure:  ELECTROCONVULSIVE THERAPY, 60 seconds (N/A ) Diagnosis:       Severe single current episode of major depressive disorder, with psychotic features      (Severe single current episode of major depressive disorder, with psychotic features [F32.3])    Surgeon:  Thomas De Leon MD Provider:  Jn Edge MD    Anesthesia Type:  general ASA Status:  2          Anesthesia Type: general  Last vitals  BP   145/92 (06/15/18 0755)   Temp   98.7 °F (37.1 °C) (06/15/18 0848)   Pulse   115 (06/15/18 0755)   Resp   18 (06/15/18 0848)     SpO2   99 % (06/15/18 0848)     Post Anesthesia Care and Evaluation    Patient location during evaluation: PHASE II  Patient participation: complete - patient participated  Level of consciousness: awake and alert  Pain score: 1  Pain management: adequate  Airway patency: patent  Anesthetic complications: No anesthetic complications  PONV Status: controlled  Cardiovascular status: acceptable  Respiratory status: acceptable  Hydration status: acceptable

## 2018-06-15 NOTE — OP NOTE
ECT OPERATIVE PROCEDURE REPORT      PATIENT NAME: Ernesto Easley    Assistants: None    Primary Surgeon: JEAN PAUL De Leon MD    Pre-Op Diagnosis:   Major Depression, Single Episode, Severe Without Psychosis    Post- Operative Diagnosis: Same  : 1950    SSN:     MRN#: [id number]    PHYSICIAN: JEAN PAUL DE LEON M.D.    PROCEDURE DATE: 06/15/18    PROCEDURE: Bifrontal ECT utilizing Spectrum 5000 q. Machine. Settings at 0.5 ms, 90Hz, 2.0 seconds, and 800 mA. This is the patient's 2 Tx in this series.    ANESTHESIA: See anesthesia report for medications and monitoring.                           Brevital:100        mg          Succinylcholine: 100        mg    DETAILS: Impulse at 0937 with a resultant 60 second seizure.    Specimens Removed: NA  Blood Administered: NA    Complications: None    Pt STATUS STABLE: 0950  HR    Grafts or Implants: NA    Dictated utilizing Dragon dictation

## 2018-06-15 NOTE — PLAN OF CARE
Problem: Patient Care Overview  Goal: Plan of Care Review  Outcome: Ongoing (interventions implemented as appropriate)   06/15/18 0515   Coping/Psychosocial   Plan of Care Reviewed With patient   Coping/Psychosocial   Patient Agreement with Plan of Care agrees   Plan of Care Review   Progress no change       Problem: Overarching Goals (Adult)  Goal: Adheres to Safety Considerations for Self and Others  Outcome: Ongoing (interventions implemented as appropriate)    Goal: Optimized Coping Skills in Response to Life Stressors  Outcome: Ongoing (interventions implemented as appropriate)    Goal: Develops/Participates in Therapeutic [place] to Support Successful Transition  Outcome: Ongoing (interventions implemented as appropriate)

## 2018-06-15 NOTE — PROGRESS NOTES
"Therapist met with patient at bed side to address concerns and discuss progress with treatment.  Patient discussed feeling \"okay\" today.  He reported feeling less depressed though somewhat irritable.  Patient discussed feeling as if people are talking about him.  He reports ECT treatments seem to be somewhat helpful.  He reports plan to follow up with [facility] upon discharge and return home with wife.  Therapist reviewed patient's chart.  His family are planning to visit tomorrow to assess patient's baseline.  "

## 2018-06-15 NOTE — PROGRESS NOTES
Chart reviewed. Creatinine remains around 1.3.  Would recommend continuing current blood pressure regimen with Norvasc daily in addition to PRN clonidine 0.1mg for systolic bp>160mmgHg.

## 2018-06-15 NOTE — PROGRESS NOTES
Contact the patient's wife and discuss patient's status as well as treatment plan.  Will schedule patient for ECT Monday but actually decide about the next treatment earlier  that morning.  They are to visit this weekend and let the nursing no their impression of the patient's status.

## 2018-06-16 LAB
ANION GAP SERPL CALCULATED.3IONS-SCNC: 3.9 MMOL/L (ref 3.6–11.2)
BUN BLD-MCNC: 16 MG/DL (ref 7–21)
BUN/CREAT SERPL: 13.7 (ref 7–25)
CALCIUM SPEC-SCNC: 8.8 MG/DL (ref 7.7–10)
CHLORIDE SERPL-SCNC: 106 MMOL/L (ref 99–112)
CO2 SERPL-SCNC: 28.1 MMOL/L (ref 24.3–31.9)
CREAT BLD-MCNC: 1.17 MG/DL (ref 0.43–1.29)
GFR SERPL CREATININE-BSD FRML MDRD: 63 ML/MIN/1.73
GLUCOSE BLD-MCNC: 96 MG/DL (ref 70–110)
OSMOLALITY SERPL CALC.SUM OF ELEC: 276.7 MOSM/KG (ref 273–305)
POTASSIUM BLD-SCNC: 4.4 MMOL/L (ref 3.5–5.3)
SODIUM BLD-SCNC: 138 MMOL/L (ref 135–153)

## 2018-06-16 PROCEDURE — 80048 BASIC METABOLIC PNL TOTAL CA: CPT | Performed by: PHYSICIAN ASSISTANT

## 2018-06-16 PROCEDURE — 99232 SBSQ HOSP IP/OBS MODERATE 35: CPT | Performed by: PSYCHIATRY & NEUROLOGY

## 2018-06-16 RX ADMIN — FLUOXETINE 20 MG: 20 CAPSULE ORAL at 08:57

## 2018-06-16 RX ADMIN — PANTOPRAZOLE SODIUM 40 MG: 40 TABLET, DELAYED RELEASE ORAL at 16:38

## 2018-06-16 RX ADMIN — ATENOLOL 25 MG: 25 TABLET ORAL at 08:57

## 2018-06-16 RX ADMIN — CLONIDINE HYDROCHLORIDE 0.1 MG: 0.1 TABLET ORAL at 00:57

## 2018-06-16 RX ADMIN — ASPIRIN 81 MG: 81 TABLET, CHEWABLE ORAL at 08:57

## 2018-06-16 RX ADMIN — CLONIDINE HYDROCHLORIDE 0.1 MG: 0.1 TABLET ORAL at 16:43

## 2018-06-16 RX ADMIN — TRAZODONE HYDROCHLORIDE 50 MG: 50 TABLET ORAL at 21:28

## 2018-06-16 RX ADMIN — AMLODIPINE BESYLATE 10 MG: 10 TABLET ORAL at 08:57

## 2018-06-16 RX ADMIN — OLANZAPINE 7.5 MG: 2.5 TABLET, FILM COATED ORAL at 21:28

## 2018-06-16 RX ADMIN — DOCUSATE SODIUM AND SENNOSIDES 2 TABLET: 8.6; 5 TABLET, FILM COATED ORAL at 08:57

## 2018-06-16 NOTE — PROGRESS NOTES
"      Inpatient Psy Progress Note   Clinician: Tej De Leon MD  Admission Date: 6/7/2018  2:28 PM 06/16/18    Behavioral Health Treatment Plan and Problem List: I have reviewed and approved the Behavioral Health Treatment Plan and Problem list.    Allergies  No Known Allergies    Hospital Day: 9 days      Assessment completed within view of staff    History  CC: inpatient followup  Interval HPI: Patient seen and evaluated by me.  Chart reviewed.  Patient remains profoundly depressed.  He rates his current level depression at a 10 out of 10.  Anxiety 10 out of 10.  He denies side effects from his medication.    Interval Review of Systems:   General ROS: negative for - fever or malaise  Endocrine ROS: negative for - palpitations  Respiratory ROS: no cough, shortness of breath, or wheezing  Cardiovascular ROS: no chest pain or dyspnea on exertion  Gastrointestinal ROS: no abdominal pain,no black or bloody stools    /73 (BP Location: Right arm, Patient Position: Sitting)   Pulse 75   Temp 98.4 °F (36.9 °C) (Temporal Artery )   Resp 18   Ht 172 cm (67.72\")   Wt 81.2 kg (179 lb)   SpO2 97%   BMI 27.44 kg/m²     Mental Status Exam  Mood: depressed  Affect: mood-congruent   Thought Processes: linear, logical, and goal directed  Thought Content: negativistic  Hallucinations: no  Suicidal Thoughts: denies  Suicidal Plan/Intent:denies  Hopelesness:Severe  Homicidal Thoughts:  absent      Medical Decision Making:   Labs:     Lab Results (last 24 hours)     Procedure Component Value Units Date/Time    Osmolality, Calculated [[id number]]  (Normal) Collected:  06/16/18 1032    Specimen:  Blood Updated:  06/16/18 1119     Osmolality Calc 276.7 mOsm/kg     Basic Metabolic Panel [[id number]]  (Normal) Collected:  06/16/18 1032    Specimen:  Blood Updated:  06/16/18 1119     Glucose 96 mg/dL      BUN 16 mg/dL      Creatinine 1.17 mg/dL      Sodium 138 mmol/L      Potassium 4.4 mmol/L      Comment: 1+ Hemolysis         " Chloride 106 mmol/L      CO2 28.1 mmol/L      Calcium 8.8 mg/dL      eGFR Non African Amer 63 mL/min/1.73      BUN/Creatinine Ratio 13.7     Anion Gap 3.9 mmol/L     Narrative:       GFR Normal >60  Chronic Kidney Disease <60  Kidney Failure <15            Radiology:     Imaging Results (last 24 hours)     ** No results found for the last 24 hours. **            EKG:     ECG/EMG Results (most recent)     Procedure Component Value Units Date/Time    ECG 12 Lead [[id number]] Collected:  06/07/18 1443     Updated:  06/07/18 2023    Narrative:       Test Reason : Potential adverse reaction to medications.  Blood Pressure : **/** mmHG  Vent. Rate : 056 BPM     Atrial Rate : 056 BPM     P-R Int : 150 ms          QRS Dur : 106 ms      QT Int : 412 ms       P-R-T Axes : 064 057 048 degrees     QTc Int : 397 ms    Sinus bradycardia  Otherwise normal ECG  No previous ECGs available  Confirmed by Thai Burnham ([id number]) on 6/7/2018 8:23:37 PM    Referred By:  SARAH           Confirmed By:Thai Burnham    ECG 12 Lead [[id number]] Collected:  06/15/18 1008     Updated:  06/15/18 1048    Narrative:       Test Reason : arrhythmias, sinus rachael  Blood Pressure : **/** mmHG  Vent. Rate : 069 BPM     Atrial Rate : 069 BPM     P-R Int : 142 ms          QRS Dur : 092 ms      QT Int : 410 ms       P-R-T Axes : 070 057 050 degrees     QTc Int : 439 ms    Normal sinus rhythm  Normal ECG  When compared with ECG of 07-JUN-2018 14:43,  No significant change was found  Confirmed by Kevin Hong ([id number]) on 6/15/2018 10:48:25 AM    Referred By:  ELLE           Confirmed By:Kevin Hong           Medications:     amLODIPine 10 mg Oral Q24H   aspirin 81 mg Oral Daily   atenolol 25 mg Oral Daily   bisacodyl 10 mg Rectal Daily   FLUoxetine 20 mg Oral Daily   OLANZapine 7.5 mg Oral Nightly   pantoprazole 40 mg Oral Q PM   polyethylene glycol 17 g Oral Daily   sennosides-docusate sodium 2 tablet Oral BID          All medications  reviewed.      Assessment and Plan:      Diagnosis Code   • Major depressive disorder with psychotic features  Plan: Continue prozac 20mg daily and zyprexa 7.5mg QHS.   F32.3    • Depression with suicidal ideation   Plan: Continue 15min checks. F32.9, R45.858   • HTN (hypertension)   Plan: continues patient current medication, Norvasc and atenolol and evaluate prospectively for necessary changes.   I10          Continue hospitalization for safety and stabilization.  Continue current level of Special Precautions (q15 minute checks).

## 2018-06-16 NOTE — PLAN OF CARE
Problem: Patient Care Overview  Goal: Plan of Care Review  Outcome: Ongoing (interventions implemented as appropriate)  Rated anxiety and depression as 10. Denies S.I. Stayed in room all of shift. Blood pressure has been elevated, but it did come down with Clonidine. Declined a.m. lab today, requesting lab come back after breakfast. Has appeared to sleep most of shift.   06/16/18 0527   Coping/Psychosocial   Plan of Care Reviewed With patient   Coping/Psychosocial   Patient Agreement with Plan of Care agrees   Plan of Care Review   Progress no change       Problem: Overarching Goals (Adult)  Goal: Adheres to Safety Considerations for Self and Others  Outcome: Ongoing (interventions implemented as appropriate)    Goal: Optimized Coping Skills in Response to Life Stressors  Outcome: Ongoing (interventions implemented as appropriate)    Goal: Develops/Participates in Therapeutic [place] to Support Successful Transition  Outcome: Ongoing (interventions implemented as appropriate)

## 2018-06-16 NOTE — PROGRESS NOTES
Chart reviewed.  Blood pressures continue to be acceptable with current regimen. Reviewed labs with creatinine improved slightly on this date. Continue current BP regimen as previously outlined.  (3 today's some wide fluctuations of blood pressure, follow.)

## 2018-06-16 NOTE — PLAN OF CARE
Problem: Patient Care Overview  Goal: Plan of Care Review  Outcome: Ongoing (interventions implemented as appropriate)  Rates anxiety 10 and depression 10. Denies suicidal thoughts.   Goal: Individualization and Mutuality  Outcome: Ongoing (interventions implemented as appropriate)    Goal: Discharge Needs Assessment  Outcome: Ongoing (interventions implemented as appropriate)    Goal: Interprofessional Rounds/Family Conf  Outcome: Ongoing (interventions implemented as appropriate)      Problem: Overarching Goals (Adult)  Goal: Adheres to Safety Considerations for Self and Others  Outcome: Ongoing (interventions implemented as appropriate)    Goal: Optimized Coping Skills in Response to Life Stressors  Outcome: Ongoing (interventions implemented as appropriate)    Goal: Develops/Participates in Therapeutic [place] to Support Successful Transition  Outcome: Ongoing (interventions implemented as appropriate)      Problem: Mood Impairment (Depressive Signs/Symptoms) (Adult)  Goal: Improved Mood Symptoms (Depressive Signs/Symptoms)  Outcome: Ongoing (interventions implemented as appropriate)      Problem: Mood Impairment (Anxiety Signs/Symptoms) (Adult)  Goal: Improved Mood Symptoms (Anxiety Signs/Symptoms)  Outcome: Ongoing (interventions implemented as appropriate)      Problem: Suicidal Behavior (Adult)  Goal: Suicidal Behavior is Absent/Minimized/Managed  Outcome: Ongoing (interventions implemented as appropriate)      Problem: Fall Risk (Adult)  Goal: Identify Related Risk Factors and Signs and Symptoms  Outcome: Ongoing (interventions implemented as appropriate)    Goal: Absence of Fall  Outcome: Ongoing (interventions implemented as appropriate)      Problem: Nutrition, Imbalanced: Inadequate Oral Intake (Adult)  Goal: Identify Related Risk Factors and Signs and Symptoms  Outcome: Ongoing (interventions implemented as appropriate)    Goal: Improved Oral Intake  Outcome: Ongoing (interventions implemented as  appropriate)    Goal: Prevent Further Weight Loss  Outcome: Ongoing (interventions implemented as appropriate)      Problem: Procedure Safety and Recovery (ECT) (Adult)  Goal: Optimal Procedural Recovery  Outcome: Ongoing (interventions implemented as appropriate)      Problem: Patient Care Overview  Goal: Plan of Care Review  Outcome: Ongoing (interventions implemented as appropriate)    Goal: Individualization and Mutuality  Outcome: Ongoing (interventions implemented as appropriate)    Goal: Discharge Needs Assessment  Outcome: Ongoing (interventions implemented as appropriate)    Goal: Interprofessional Rounds/Family Conf  Outcome: Ongoing (interventions implemented as appropriate)

## 2018-06-17 LAB
ANION GAP SERPL CALCULATED.3IONS-SCNC: 3.7 MMOL/L (ref 3.6–11.2)
BUN BLD-MCNC: 14 MG/DL (ref 7–21)
BUN/CREAT SERPL: 11.7 (ref 7–25)
CALCIUM SPEC-SCNC: 9.3 MG/DL (ref 7.7–10)
CHLORIDE SERPL-SCNC: 108 MMOL/L (ref 99–112)
CO2 SERPL-SCNC: 30.3 MMOL/L (ref 24.3–31.9)
CREAT BLD-MCNC: 1.2 MG/DL (ref 0.43–1.29)
GFR SERPL CREATININE-BSD FRML MDRD: 61 ML/MIN/1.73
GLUCOSE BLD-MCNC: 111 MG/DL (ref 70–110)
OSMOLALITY SERPL CALC.SUM OF ELEC: 284.3 MOSM/KG (ref 273–305)
POTASSIUM BLD-SCNC: 3.9 MMOL/L (ref 3.5–5.3)
SODIUM BLD-SCNC: 142 MMOL/L (ref 135–153)

## 2018-06-17 PROCEDURE — 99232 SBSQ HOSP IP/OBS MODERATE 35: CPT | Performed by: PSYCHIATRY & NEUROLOGY

## 2018-06-17 PROCEDURE — 80048 BASIC METABOLIC PNL TOTAL CA: CPT | Performed by: PHYSICIAN ASSISTANT

## 2018-06-17 RX ADMIN — ATENOLOL 25 MG: 25 TABLET ORAL at 09:41

## 2018-06-17 RX ADMIN — PANTOPRAZOLE SODIUM 40 MG: 40 TABLET, DELAYED RELEASE ORAL at 16:10

## 2018-06-17 RX ADMIN — AMLODIPINE BESYLATE 10 MG: 10 TABLET ORAL at 09:42

## 2018-06-17 RX ADMIN — ASPIRIN 81 MG: 81 TABLET, CHEWABLE ORAL at 09:42

## 2018-06-17 RX ADMIN — FLUOXETINE 20 MG: 20 CAPSULE ORAL at 09:42

## 2018-06-17 RX ADMIN — OLANZAPINE 7.5 MG: 2.5 TABLET, FILM COATED ORAL at 20:40

## 2018-06-17 RX ADMIN — POLYETHYLENE GLYCOL (3350) 17 G: 17 POWDER, FOR SOLUTION ORAL at 09:41

## 2018-06-17 RX ADMIN — DOCUSATE SODIUM AND SENNOSIDES 2 TABLET: 8.6; 5 TABLET, FILM COATED ORAL at 09:41

## 2018-06-17 NOTE — PROGRESS NOTES
Chart reviewed. Vital signs & Labs reviewed. Blood pressures reviewed. Recommend continuing current regimen of Amlodipine 10mg daily in addition to atenolol 25mg daily and PRN clonidine. Creatinine appears to be at baseline.  BMP scheduling has been changed to every other day.      EGD pathology requested and reviewed from EGD performed last month by Dr. Greenwood as following this patient did reportedly become focused with the idea that he was dying from cancer. 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

## 2018-06-17 NOTE — PLAN OF CARE
"Problem: Patient Care Overview  Goal: Plan of Care Review  Outcome: Ongoing (interventions implemented as appropriate)  Rated anxiety and depression as 8. When asked about S.I., he replied: \"it comes and goes\" then said he wasn't. Unsure whether he'll have any more ECT or not. Did come out of room for snack and to make a phone call. Was able to smile some and become animated a couple times. Appeared to sleep well this shift.   06/17/18 0525   Coping/Psychosocial   Plan of Care Reviewed With patient   Coping/Psychosocial   Patient Agreement with Plan of Care agrees   Plan of Care Review   Progress improving       Problem: Overarching Goals (Adult)  Goal: Adheres to Safety Considerations for Self and Others  Outcome: Ongoing (interventions implemented as appropriate)    Goal: Optimized Coping Skills in Response to Life Stressors  Outcome: Ongoing (interventions implemented as appropriate)    Goal: Develops/Participates in Therapeutic [place] to Support Successful Transition  Outcome: Ongoing (interventions implemented as appropriate)        "

## 2018-06-17 NOTE — PLAN OF CARE
Problem: Patient Care Overview  Goal: Plan of Care Review  Outcome: Ongoing (interventions implemented as appropriate)  Rates anxiety 10 and depression 10. Denies suicidal thoughts.   Goal: Individualization and Mutuality  Outcome: Ongoing (interventions implemented as appropriate)    Goal: Discharge Needs Assessment  Outcome: Ongoing (interventions implemented as appropriate)    Goal: Interprofessional Rounds/Family Conf  Outcome: Ongoing (interventions implemented as appropriate)      Problem: Overarching Goals (Adult)  Goal: Adheres to Safety Considerations for Self and Others  Outcome: Ongoing (interventions implemented as appropriate)    Goal: Optimized Coping Skills in Response to Life Stressors  Outcome: Ongoing (interventions implemented as appropriate)    Goal: Develops/Participates in Therapeutic [place] to Support Successful Transition  Outcome: Ongoing (interventions implemented as appropriate)      Problem: Mood Impairment (Depressive Signs/Symptoms) (Adult)  Goal: Improved Mood Symptoms (Depressive Signs/Symptoms)  Outcome: Ongoing (interventions implemented as appropriate)      Problem: Mood Impairment (Anxiety Signs/Symptoms) (Adult)  Goal: Improved Mood Symptoms (Anxiety Signs/Symptoms)  Outcome: Ongoing (interventions implemented as appropriate)      Problem: Suicidal Behavior (Adult)  Goal: Suicidal Behavior is Absent/Minimized/Managed  Outcome: Ongoing (interventions implemented as appropriate)      Problem: Fall Risk (Adult)  Goal: Identify Related Risk Factors and Signs and Symptoms  Outcome: Ongoing (interventions implemented as appropriate)    Goal: Absence of Fall  Outcome: Ongoing (interventions implemented as appropriate)      Problem: Nutrition, Imbalanced: Inadequate Oral Intake (Adult)  Goal: Identify Related Risk Factors and Signs and Symptoms  Outcome: Ongoing (interventions implemented as appropriate)    Goal: Improved Oral Intake  Outcome: Ongoing (interventions implemented as  appropriate)    Goal: Prevent Further Weight Loss  Outcome: Ongoing (interventions implemented as appropriate)      Problem: Procedure Safety and Recovery (ECT) (Adult)  Goal: Optimal Procedural Recovery  Outcome: Ongoing (interventions implemented as appropriate)    Goal: Optimal Procedural Recovery  Outcome: Ongoing (interventions implemented as appropriate)      Problem: Patient Care Overview  Goal: Plan of Care Review  Outcome: Ongoing (interventions implemented as appropriate)    Goal: Individualization and Mutuality  Outcome: Ongoing (interventions implemented as appropriate)    Goal: Discharge Needs Assessment  Outcome: Ongoing (interventions implemented as appropriate)    Goal: Interprofessional Rounds/Family Conf  Outcome: Ongoing (interventions implemented as appropriate)

## 2018-06-17 NOTE — PROGRESS NOTES
"      Inpatient Psy Progress Note   Clinician: Tej De Leon MD  Admission Date: 6/7/2018  11:11 AM 06/17/18    Behavioral Health Treatment Plan and Problem List: I have reviewed and approved the Behavioral Health Treatment Plan and Problem list.    Allergies  No Known Allergies    Hospital Day: 10 days      Assessment completed within view of staff    History  CC: inpatient followup  Interval HPI: Patient seen and evaluated by me.  Chart reviewed.  Severe depression persisting at a 10/10.  Tolerating meds okay.  Slept \"so-so\".      Interval Review of Systems:   General ROS: negative for - fever or malaise  Endocrine ROS: negative for - palpitations  Respiratory ROS: no cough, shortness of breath, or wheezing  Cardiovascular ROS: no chest pain or dyspnea on exertion  Gastrointestinal ROS: no abdominal pain,no black or bloody stools    /78   Pulse 81   Temp 97.6 °F (36.4 °C) (Temporal Artery )   Resp 18   Ht 172 cm (67.72\")   Wt 81.2 kg (179 lb)   SpO2 96%   BMI 27.44 kg/m²     Mental Status Exam  Mood: depressed  Affect: mood-congruent   Thought Processes: linear, logical, and goal directed  Thought Content: negativistic  Hallucinations: no  Suicidal Thoughts: denies  Suicidal Plan/Intent:denies  Hopelesness:Moderate  Homicidal Thoughts:  absent      Medical Decision Making:   Labs:     Lab Results (last 24 hours)     Procedure Component Value Units Date/Time    Basic Metabolic Panel [[id number]]  (Abnormal) Collected:  06/17/18 0832    Specimen:  Blood Updated:  06/17/18 0924     Glucose 111 (H) mg/dL      BUN 14 mg/dL      Creatinine 1.20 mg/dL      Sodium 142 mmol/L      Potassium 3.9 mmol/L      Chloride 108 mmol/L      CO2 30.3 mmol/L      Calcium 9.3 mg/dL      eGFR Non African Amer 61 mL/min/1.73      BUN/Creatinine Ratio 11.7     Anion Gap 3.7 mmol/L     Narrative:       GFR Normal >60  Chronic Kidney Disease <60  Kidney Failure <15    Osmolality, Calculated [[id number]]  (Normal) Collected:  " 06/17/18 0832    Specimen:  Blood Updated:  06/17/18 0924     Osmolality Calc 284.3 mOsm/kg     Osmolality, Calculated [[id number]]  (Normal) Collected:  06/16/18 1032    Specimen:  Blood Updated:  06/16/18 1119     Osmolality Calc 276.7 mOsm/kg     Basic Metabolic Panel [[id number]]  (Normal) Collected:  06/16/18 1032    Specimen:  Blood Updated:  06/16/18 1119     Glucose 96 mg/dL      BUN 16 mg/dL      Creatinine 1.17 mg/dL      Sodium 138 mmol/L      Potassium 4.4 mmol/L      Comment: 1+ Hemolysis         Chloride 106 mmol/L      CO2 28.1 mmol/L      Calcium 8.8 mg/dL      eGFR Non African Amer 63 mL/min/1.73      BUN/Creatinine Ratio 13.7     Anion Gap 3.9 mmol/L     Narrative:       GFR Normal >60  Chronic Kidney Disease <60  Kidney Failure <15            Radiology:     Imaging Results (last 24 hours)     ** No results found for the last 24 hours. **            EKG:     ECG/EMG Results (most recent)     Procedure Component Value Units Date/Time    ECG 12 Lead [[id number]] Collected:  06/07/18 1443     Updated:  06/07/18 2023    Narrative:       Test Reason : Potential adverse reaction to medications.  Blood Pressure : **/** mmHG  Vent. Rate : 056 BPM     Atrial Rate : 056 BPM     P-R Int : 150 ms          QRS Dur : 106 ms      QT Int : 412 ms       P-R-T Axes : 064 057 048 degrees     QTc Int : 397 ms    Sinus bradycardia  Otherwise normal ECG  No previous ECGs available  Confirmed by Thai Burnham ([id number]) on 6/7/2018 8:23:37 PM    Referred By:  SARAH           Confirmed By:Thai Burnham    ECG 12 Lead [[id number]] Collected:  06/15/18 1008     Updated:  06/15/18 1048    Narrative:       Test Reason : arrhythmias, sinus rachael  Blood Pressure : **/** mmHG  Vent. Rate : 069 BPM     Atrial Rate : 069 BPM     P-R Int : 142 ms          QRS Dur : 092 ms      QT Int : 410 ms       P-R-T Axes : 070 057 050 degrees     QTc Int : 439 ms    Normal sinus rhythm  Normal ECG  When compared with ECG of 07-JUN-2018  14:43,  No significant change was found  Confirmed by Kevin Hong ([id number]) on 6/15/2018 10:48:25 AM    Referred By:  ELLE           Confirmed By:Kevin Hong           Medications:     amLODIPine 10 mg Oral Q24H   aspirin 81 mg Oral Daily   atenolol 25 mg Oral Daily   bisacodyl 10 mg Rectal Daily   FLUoxetine 20 mg Oral Daily   OLANZapine 7.5 mg Oral Nightly   pantoprazole 40 mg Oral Q PM   polyethylene glycol 17 g Oral Daily   sennosides-docusate sodium 2 tablet Oral BID          All medications reviewed.      Assessment and Plan:      Diagnosis Code   • Major depressive disorder with psychotic features  Plan: Continue prozac 20mg daily and zyprexa 7.5mg QHS.   F32.3    • Depression with suicidal ideation   Plan: Continue 15min checks. F32.9, R45.851   • HTN (hypertension)   Plan: continues patient current medication, Norvasc and atenolol and evaluate prospectively for necessary changes.        Continue hospitalization for safety and stabilization.

## 2018-06-18 PROCEDURE — 99232 SBSQ HOSP IP/OBS MODERATE 35: CPT | Performed by: PSYCHIATRY & NEUROLOGY

## 2018-06-18 PROCEDURE — 99232 SBSQ HOSP IP/OBS MODERATE 35: CPT | Performed by: HOSPITALIST

## 2018-06-18 RX ORDER — SODIUM CHLORIDE 0.9 % (FLUSH) 0.9 %
1-10 SYRINGE (ML) INJECTION AS NEEDED
Status: DISCONTINUED | OUTPATIENT
Start: 2018-06-18 | End: 2018-06-20 | Stop reason: HOSPADM

## 2018-06-18 RX ADMIN — ATENOLOL 25 MG: 25 TABLET ORAL at 09:55

## 2018-06-18 RX ADMIN — AMLODIPINE BESYLATE 10 MG: 10 TABLET ORAL at 09:55

## 2018-06-18 RX ADMIN — ASPIRIN 81 MG: 81 TABLET, CHEWABLE ORAL at 09:55

## 2018-06-18 RX ADMIN — POLYETHYLENE GLYCOL (3350) 17 G: 17 POWDER, FOR SOLUTION ORAL at 09:55

## 2018-06-18 RX ADMIN — FLUOXETINE 30 MG: 20 CAPSULE ORAL at 09:55

## 2018-06-18 RX ADMIN — DOCUSATE SODIUM AND SENNOSIDES 2 TABLET: 8.6; 5 TABLET, FILM COATED ORAL at 09:55

## 2018-06-18 RX ADMIN — OLANZAPINE 7.5 MG: 2.5 TABLET, FILM COATED ORAL at 20:47

## 2018-06-18 RX ADMIN — PANTOPRAZOLE SODIUM 40 MG: 40 TABLET, DELAYED RELEASE ORAL at 16:09

## 2018-06-18 NOTE — PROGRESS NOTES
"    [facility]     Patient Identification:  Name:  Ernesto Easley  Age:  67 y.o.  Sex:  male  :  1950  MRN:  [id number]  Visit Number:  [id number]  Primary Care Provider:  GLO Huston    Length of stay:  11     Chief complaints: Depressed but \"better\"    Subjective:  Complaining of being depressed, but pressure so far is been stable renal function continued to improve.  Patient seems to be more inverse and and responsive to questions, improvement of his affect is notable.  Nursing staff reported he is eating better.  Last bowel movement was last night.  Primary team's progress notes today noted.  I reassured the patient again today that he has no cancer.  ----------------------------------------------------------------------------------------------------------------------  Current Hospital Meds:    amLODIPine 10 mg Oral Q24H   aspirin 81 mg Oral Daily   atenolol 25 mg Oral Daily   bisacodyl 10 mg Rectal Daily   FLUoxetine 30 mg Oral Daily   OLANZapine 7.5 mg Oral Nightly   pantoprazole 40 mg Oral Q PM   polyethylene glycol 17 g Oral Daily   sennosides-docusate sodium 2 tablet Oral BID        ----------------------------------------------------------------------------------------------------------------------  Vital Signs:  Temp:  [97.6 °F (36.4 °C)-99.1 °F (37.3 °C)] 97.6 °F (36.4 °C)  Heart Rate:  [66-80] 80  Resp:  [18] 18  BP: (107-160)/(63-92) 148/91       Tele:   1    18  2135 18  0916 06/15/18  0848   Weight: 81.3 kg (179 lb 3.2 oz) 81.2 kg (179 lb) 81.2 kg (179 lb)     Body mass index is 27.44 kg/m².  No intake or output data in the 24 hours ending 18 1505  Diet Regular; Low Sodium; 2,000 mg Na  ----------------------------------------------------------------------------------------------------------------------  Physical exam:  General: Comfortable,awake, alert, oriented to self, place, and time, well-developed and well-nourished.  No " respiratory distress.    Skin:  Skin is warm and dry. No rash noted. No pallor.    HENT:  Head:  Normocephalic and atraumatic.  Mouth:  Moist mucous membranes.    Eyes:  Conjunctivae and EOM are normal.  Pupils are equal, round, and reactive to light.  No scleral icterus.    Neck:  Neck supple.  No JVD present.    Pulmonary/Chest:  No respiratory distress, no wheezes, no crackles, with normal breath sounds and good air movement.  Cardiovascular:  Normal rate, regular rhythm and normal heart sounds with no murmur.  Abdominal:  Soft.  Bowel sounds are normal.  No distension and no tenderness.   Extremities:  no tenderness, and no deformity.  No red or swollen joints anywhere.  Strong pulses in all 4 extremities with no clubbing, no cyanosis, no edema.  Neurological:  Motor strength equal no obvious deficit, sensory grossly intact.   No cranial nerve deficit.  No tongue deviation.  No facial droop.  No slurred speech.        ----------------------------------------------------------------------------------------------------------------------  ----------------------------------------------------------------------------------------------------------------------        Results from last 7 days  Lab Units 06/12/18  0448   WBC 10*3/mm3 7.81   HEMOGLOBIN g/dL 14.8   HEMATOCRIT % 42.6   MCV fL 92.2   MCHC g/dL 34.7   PLATELETS 10*3/mm3 276           Results from last 7 days  Lab Units 06/17/18  0832 06/16/18  1032 06/15/18  0446   SODIUM mmol/L 142 138 141   POTASSIUM mmol/L 3.9 4.4 3.7   CHLORIDE mmol/L 108 106 107   CO2 mmol/L 30.3 28.1 28.6   BUN mg/dL 14 16 17   CREATININE mg/dL 1.20 1.17 1.31*   EGFR IF NONAFRICN AM mL/min/1.73 61 63 55*   CALCIUM mg/dL 9.3 8.8 8.9   GLUCOSE mg/dL 111* 96 89   Estimated Creatinine Clearance: 70.5 mL/min (by C-G formula based on SCr of 1.2 mg/dL).    No results found for: AMMONIA                    I have personally looked at the labs and they are summarized  above.  ----------------------------------------------------------------------------------------------------------------------  Imaging Results (last 24 hours)     ** No results found for the last 24 hours. **        ----------------------------------------------------------------------------------------------------------------------  Assessment and Plan:  - Essential hypertension, controlled  - Acute kidney injury, prerenal secondary to medication and poor intake was now resolved  - Depression with psychotic features    Patient is improving with regards to his blood pressure, his renal function, as well as his hydration status.  Will sign off the case.  Re-consult if needed. Thank you.      Beatris Velazquez MD  06/18/18  3:05 PM

## 2018-06-18 NOTE — PROGRESS NOTES
"INPATIENT PSYCHIATRIC PROGRESS NOTE    Name:  Ernesto Easley  :  1950  MRN:  [id number]  Visit Number:  [id number]  Length of stay:  11    Behavioral Health Treatment Plan and Problem List: I have reviewed and approved the Behavioral Health Treatment Plan and Problem list.    SUBJECTIVE  CC: \"Bad\"    INTERVAL HISTORY: Contacted the patient wife this AM ( [phone]). Patient negative and depressed during their visit.     Patient remains depressed, hopeless, worthless and feeling hopeless. \"My life is a mess\". \"Nobody wants to be around me, people act like I've got the plague \". Still a serious risk for self harm. Still believes he has CA despite reassurance otherwise.   Talks of ways he has considered to take his own life: cutting wrist, shooting himself (family had taken guns out of his home).       Unable to convince patient to proceed with ECT and had to cancel today's treatment. Will continue with current medications: Prozac and Zyprexa.   Patient says might consent to ECT tomorrow - will schedule and hope for the best.     Depression rating 10/10  Anxiety rating 10/10  Sleep: 6-7 hours       Review of Systems   Respiratory: Negative.    Cardiovascular: Negative.    Gastrointestinal: Negative.    Musculoskeletal: Negative.    Neurological: Negative.        OBJECTIVE    Temp:  [97.6 °F (36.4 °C)-99.1 °F (37.3 °C)] 97.6 °F (36.4 °C)  Heart Rate:  [66-81] 70  Resp:  [18] 18  BP: (107-128)/(63-78) 116/69    MENTAL STATUS EXAM:      Appearance:Casually dressed, good hygeine.   Cooperation:Cooperative  Psychomotor: No psychomotor agitation/retardation, No EPS, No motor tics  Speech-normal rate, amount.   Mood/Affect: Depressed  Thought Processes: slow  Thought Content: dilussional, negativistic and distorted   Hallucination(s): none  Hopelessness: Yes  Optimistic:No  Suicidal Thoughts:  slight  Suicidal Plan/Intent: none  Homicidal Thoughts:  absent  Orientation: oriented x 3  Memory: recent intact    Lab " Results (last 24 hours)     Procedure Component Value Units Date/Time    Basic Metabolic Panel [[id number]]  (Abnormal) Collected:  06/17/18 0832    Specimen:  Blood Updated:  06/17/18 0924     Glucose 111 (H) mg/dL      BUN 14 mg/dL      Creatinine 1.20 mg/dL      Sodium 142 mmol/L      Potassium 3.9 mmol/L      Chloride 108 mmol/L      CO2 30.3 mmol/L      Calcium 9.3 mg/dL      eGFR Non African Amer 61 mL/min/1.73      BUN/Creatinine Ratio 11.7     Anion Gap 3.7 mmol/L     Narrative:       GFR Normal >60  Chronic Kidney Disease <60  Kidney Failure <15    Osmolality, Calculated [[id number]]  (Normal) Collected:  06/17/18 0832    Specimen:  Blood Updated:  06/17/18 0924     Osmolality Calc 284.3 mOsm/kg            Imaging Results (last 24 hours)     ** No results found for the last 24 hours. **           ECG/EMG Results (most recent)     Procedure Component Value Units Date/Time    ECG 12 Lead [[id number]] Collected:  06/07/18 1443     Updated:  06/07/18 2023    Narrative:       Test Reason : Potential adverse reaction to medications.  Blood Pressure : **/** mmHG  Vent. Rate : 056 BPM     Atrial Rate : 056 BPM     P-R Int : 150 ms          QRS Dur : 106 ms      QT Int : 412 ms       P-R-T Axes : 064 057 048 degrees     QTc Int : 397 ms    Sinus bradycardia  Otherwise normal ECG  No previous ECGs available  Confirmed by Thai Burnham ([id number]) on 6/7/2018 8:23:37 PM    Referred By:  SARAH           Confirmed By:Thai Burnham    ECG 12 Lead [[id number]] Collected:  06/15/18 1008     Updated:  06/15/18 1048    Narrative:       Test Reason : arrhythmias, sinus rachael  Blood Pressure : **/** mmHG  Vent. Rate : 069 BPM     Atrial Rate : 069 BPM     P-R Int : 142 ms          QRS Dur : 092 ms      QT Int : 410 ms       P-R-T Axes : 070 057 050 degrees     QTc Int : 439 ms    Normal sinus rhythm  Normal ECG  When compared with ECG of 07-JUN-2018 14:43,  No significant change was found  Confirmed by Kevin Hong ([id number])  on 6/15/2018 10:48:25 AM    Referred By:  ELLE           Confirmed By:Kevin Hong           ALLERGIES: Patient has no known allergies.      Current Facility-Administered Medications:   •  acetaminophen (TYLENOL) tablet 650 mg, 650 mg, Oral, Q4H PRN, Eduardo Bhandari MD, 650 mg at 06/15/18 1105  •  ALPRAZolam (XANAX) tablet 0.5 mg, 0.5 mg, Oral, TID PRN, Eduardo Bhandari MD, 0.5 mg at 06/13/18 1339  •  aluminum-magnesium hydroxide-simethicone (MAALOX MAX) 400-400-40 MG/5ML suspension 15 mL, 15 mL, Oral, Q6H PRN, Eduardo Bhandari MD  •  amLODIPine (NORVASC) tablet 10 mg, 10 mg, Oral, Q24H, Christiana Holland PA-C, 10 mg at 06/17/18 0942  •  aspirin chewable tablet 81 mg, 81 mg, Oral, Daily, Eduardo Bhandari MD, 81 mg at 06/17/18 0942  •  atenolol (TENORMIN) tablet 25 mg, 25 mg, Oral, Daily, Christiana Holland PA-C, 25 mg at 06/17/18 0941  •  benzonatate (TESSALON) capsule 100 mg, 100 mg, Oral, TID PRN, Eduardo Bhandari MD  •  benztropine (COGENTIN) tablet 1 mg, 1 mg, Oral, Daily PRN **OR** benztropine (COGENTIN) injection 0.5 mg, 0.5 mg, Intramuscular, Daily PRN, Eduardo Bhandari MD  •  bisacodyl (DULCOLAX) suppository 10 mg, 10 mg, Rectal, Daily, Beatris Velazquez MD  •  CloNIDine (CATAPRES) tablet 0.1 mg, 0.1 mg, Oral, Q8H PRN, Christiana Holland PA-C, 0.1 mg at 06/16/18 1643  •  famotidine (PEPCID) tablet 20 mg, 20 mg, Oral, BID PRN, Eduardo Bhandari MD  •  FLUoxetine (PROzac) capsule 20 mg, 20 mg, Oral, Daily, Thomas De Leon MD, 20 mg at 06/17/18 0942  •  ipratropium-albuterol (DUO-NEB) nebulizer solution 3 mL, 3 mL, Nebulization, Once PRN, Wili Nation CRNA  •  magnesium hydroxide (MILK OF MAGNESIA) suspension 2400 mg/10mL 10 mL, 10 mL, Oral, Daily PRN, Eduardo Bhandari MD  •  OLANZapine (zyPREXA) tablet 7.5 mg, 7.5 mg, Oral, Nightly, Thomas De Leon MD, 7.5 mg at 06/17/18 2040  •  OLANZapine zydis (zyPREXA) disintegrating tablet 5 mg, 5 mg, Oral, Q8H PRN, Eduardo Bhandari MD  •  ondansetron  (ZOFRAN) injection 4 mg, 4 mg, Intravenous, Once PRN, Wili Nation CRNA  •  ondansetron (ZOFRAN) tablet 4 mg, 4 mg, Oral, Q6H PRN, Eduardo Bhandari MD  •  pantoprazole (PROTONIX) EC tablet 40 mg, 40 mg, Oral, Q PM, Eduardo Bhandari MD, 40 mg at 06/17/18 1610  •  polyethylene glycol 3350 powder (packet), 17 g, Oral, Daily, Thomas Canela MD, 17 g at 06/17/18 0941  •  sennosides-docusate sodium (SENOKOT-S) 8.6-50 MG tablet 2 tablet, 2 tablet, Oral, BID, Christiana Holland PA-C, 2 tablet at 06/17/18 0941  •  sodium chloride (OCEAN) nasal spray 2 spray, 2 spray, Each Nare, PRN, Eduardo Bhandari MD  •  sodium chloride 0.9 % flush 1-10 mL, 1-10 mL, Intravenous, PRN, Thomas De Leon MD  •  sodium chloride 0.9 % flush 1-10 mL, 1-10 mL, Intravenous, PRN, Thomas De Leon MD  •  traZODone (DESYREL) tablet 50 mg, 50 mg, Oral, Nightly PRN, Eduardo Bhandari MD, 50 mg at 06/16/18 2128    ASSESSMENT & PLAN    Major depressive disorder with psychotic features Plan: To continue medications that include Prozac and Zyprexa, patient refusing ECT today. .       Depression with suicidal ideation Plan: Patient is on special precautions.  •      HTN (hypertension) Plan: Hospitalist consultant evaluating patient's blood pressure medications prospectively.  Follow their recommendations•            Suicide precautions: Suicide precaution Level 3 (q15 min checks)     Behavioral Health Treatment Plan and Problem List: I have reviewed and approved the Behavioral Health Treatment Plan and Problem list.    Clinician:  Thomas De Leon MD  06/18/18  7:42 AM    Dictated utilizing Dragon dictation

## 2018-06-18 NOTE — PLAN OF CARE
Problem: Patient Care Overview  Goal: Plan of Care Review  Outcome: Ongoing (interventions implemented as appropriate)      Problem: Overarching Goals (Adult)  Goal: Adheres to Safety Considerations for Self and Others  Outcome: Ongoing (interventions implemented as appropriate)    Goal: Optimized Coping Skills in Response to Life Stressors  Outcome: Ongoing (interventions implemented as appropriate)    Goal: Develops/Participates in Therapeutic [place] to Support Successful Transition  Outcome: Ongoing (interventions implemented as appropriate)      Problem: Fall Risk (Adult)  Goal: Identify Related Risk Factors and Signs and Symptoms  Outcome: Ongoing (interventions implemented as appropriate)    Goal: Absence of Fall  Outcome: Ongoing (interventions implemented as appropriate)

## 2018-06-18 NOTE — PROGRESS NOTES
"1545  Met with patient for individual, he was in his room in bed. Patient continues to isolate in his room. We discussed ECT treatments last week and patient declined today, he tells me that he \"overheard someone in the recovery room talking about his heart rate\". I encouraged him to ask Dr. De Leon and reassured him that he would have been made aware if there was a problem. Patient says he does feel some better than prior to treatments, I encouraged him to complete the recommended treatments for best outcome, he is considering having the treatment tomorrow. Patients family will be here for visitation today, it is his wife's birthday. We discussed him refusing visitation on Sat. He says he is \"meera angry at his family because he is here\". He wants to go home and feels like he would be better if he was at home with his family.     Patient denies suicidal thoughts, he does say he will consider having ECT tomorrow. He feels more positive about getting better and returning home. Patient reports a decrease in depression however does not rate.     Continue stabilization individual and group therapy to focus on healthy coping skills and schedule follow up care. Patient is scheduled for ECT tomorrow   "

## 2018-06-19 ENCOUNTER — ANESTHESIA (OUTPATIENT)
Dept: PERIOP | Facility: HOSPITAL | Age: 68
End: 2018-06-19

## 2018-06-19 ENCOUNTER — ANESTHESIA EVENT (OUTPATIENT)
Dept: PERIOP | Facility: HOSPITAL | Age: 68
End: 2018-06-19

## 2018-06-19 LAB
ANION GAP SERPL CALCULATED.3IONS-SCNC: 6.3 MMOL/L (ref 3.6–11.2)
BUN BLD-MCNC: 17 MG/DL (ref 7–21)
BUN/CREAT SERPL: 13.9 (ref 7–25)
CALCIUM SPEC-SCNC: 9.2 MG/DL (ref 7.7–10)
CHLORIDE SERPL-SCNC: 107 MMOL/L (ref 99–112)
CO2 SERPL-SCNC: 26.7 MMOL/L (ref 24.3–31.9)
CREAT BLD-MCNC: 1.22 MG/DL (ref 0.43–1.29)
GFR SERPL CREATININE-BSD FRML MDRD: 60 ML/MIN/1.73
GLUCOSE BLD-MCNC: 81 MG/DL (ref 70–110)
OSMOLALITY SERPL CALC.SUM OF ELEC: 280 MOSM/KG (ref 273–305)
POTASSIUM BLD-SCNC: 3.9 MMOL/L (ref 3.5–5.3)
SODIUM BLD-SCNC: 140 MMOL/L (ref 135–153)

## 2018-06-19 PROCEDURE — 25010000002 SUCCINYLCHOLINE PER 20 MG: Performed by: NURSE ANESTHETIST, CERTIFIED REGISTERED

## 2018-06-19 PROCEDURE — 80048 BASIC METABOLIC PNL TOTAL CA: CPT | Performed by: PHYSICIAN ASSISTANT

## 2018-06-19 PROCEDURE — GZB2ZZZ ELECTROCONVULSIVE THERAPY, BILATERAL-SINGLE SEIZURE: ICD-10-PCS | Performed by: PSYCHIATRY & NEUROLOGY

## 2018-06-19 PROCEDURE — 90870 ELECTROCONVULSIVE THERAPY: CPT | Performed by: PSYCHIATRY & NEUROLOGY

## 2018-06-19 RX ORDER — FENTANYL CITRATE 50 UG/ML
50 INJECTION, SOLUTION INTRAMUSCULAR; INTRAVENOUS
Status: DISCONTINUED | OUTPATIENT
Start: 2018-06-19 | End: 2018-06-19 | Stop reason: HOSPADM

## 2018-06-19 RX ORDER — LIDOCAINE HYDROCHLORIDE 20 MG/ML
INJECTION, SOLUTION EPIDURAL; INFILTRATION; INTRACAUDAL; PERINEURAL AS NEEDED
Status: DISCONTINUED | OUTPATIENT
Start: 2018-06-19 | End: 2018-06-19 | Stop reason: SURG

## 2018-06-19 RX ORDER — SODIUM CHLORIDE 0.9 % (FLUSH) 0.9 %
1-10 SYRINGE (ML) INJECTION AS NEEDED
Status: DISCONTINUED | OUTPATIENT
Start: 2018-06-19 | End: 2018-06-19 | Stop reason: HOSPADM

## 2018-06-19 RX ORDER — IPRATROPIUM BROMIDE AND ALBUTEROL SULFATE 2.5; .5 MG/3ML; MG/3ML
3 SOLUTION RESPIRATORY (INHALATION) ONCE AS NEEDED
Status: DISCONTINUED | OUTPATIENT
Start: 2018-06-19 | End: 2018-06-19 | Stop reason: HOSPADM

## 2018-06-19 RX ORDER — SUCCINYLCHOLINE CHLORIDE 20 MG/ML
INJECTION INTRAMUSCULAR; INTRAVENOUS AS NEEDED
Status: DISCONTINUED | OUTPATIENT
Start: 2018-06-19 | End: 2018-06-19 | Stop reason: SURG

## 2018-06-19 RX ORDER — MEPERIDINE HYDROCHLORIDE 50 MG/ML
12.5 INJECTION INTRAMUSCULAR; INTRAVENOUS; SUBCUTANEOUS
Status: DISCONTINUED | OUTPATIENT
Start: 2018-06-19 | End: 2018-06-19 | Stop reason: HOSPADM

## 2018-06-19 RX ORDER — SODIUM CHLORIDE, SODIUM LACTATE, POTASSIUM CHLORIDE, CALCIUM CHLORIDE 600; 310; 30; 20 MG/100ML; MG/100ML; MG/100ML; MG/100ML
125 INJECTION, SOLUTION INTRAVENOUS CONTINUOUS
Status: DISCONTINUED | OUTPATIENT
Start: 2018-06-19 | End: 2018-06-20 | Stop reason: HOSPADM

## 2018-06-19 RX ORDER — OXYCODONE HYDROCHLORIDE AND ACETAMINOPHEN 5; 325 MG/1; MG/1
1 TABLET ORAL ONCE AS NEEDED
Status: DISCONTINUED | OUTPATIENT
Start: 2018-06-19 | End: 2018-06-19 | Stop reason: HOSPADM

## 2018-06-19 RX ADMIN — OLANZAPINE 7.5 MG: 2.5 TABLET, FILM COATED ORAL at 20:30

## 2018-06-19 RX ADMIN — AMLODIPINE BESYLATE 10 MG: 10 TABLET ORAL at 08:00

## 2018-06-19 RX ADMIN — ATENOLOL 25 MG: 25 TABLET ORAL at 08:00

## 2018-06-19 RX ADMIN — METHOHEXITAL SODIUM 100 MG: 500 INJECTION, POWDER, LYOPHILIZED, FOR SOLUTION INTRAMUSCULAR; INTRAVENOUS; RECTAL at 10:44

## 2018-06-19 RX ADMIN — SUCCINYLCHOLINE CHLORIDE 100 MG: 20 INJECTION, SOLUTION INTRAMUSCULAR; INTRAVENOUS at 10:44

## 2018-06-19 RX ADMIN — PANTOPRAZOLE SODIUM 40 MG: 40 TABLET, DELAYED RELEASE ORAL at 16:23

## 2018-06-19 RX ADMIN — FLUOXETINE 30 MG: 20 CAPSULE ORAL at 11:46

## 2018-06-19 RX ADMIN — ASPIRIN 81 MG: 81 TABLET, CHEWABLE ORAL at 11:47

## 2018-06-19 RX ADMIN — SODIUM CHLORIDE, POTASSIUM CHLORIDE, SODIUM LACTATE AND CALCIUM CHLORIDE 125 ML/HR: 600; 310; 30; 20 INJECTION, SOLUTION INTRAVENOUS at 09:40

## 2018-06-19 RX ADMIN — LIDOCAINE HYDROCHLORIDE 5 ML: 20 INJECTION, SOLUTION EPIDURAL; INFILTRATION; INTRACAUDAL; PERINEURAL at 10:42

## 2018-06-19 NOTE — PLAN OF CARE
Problem: Patient Care Overview  Goal: Plan of Care Review  Outcome: Ongoing (interventions implemented as appropriate)   06/19/18 0414   Coping/Psychosocial   Plan of Care Reviewed With patient   Coping/Psychosocial   Patient Agreement with Plan of Care agrees   Plan of Care Review   Progress no change   OTHER   Outcome Summary PT DENIED ANXIETY/DEPRESSION/SI/HI/HALLUCINATIONS, UNDECIDED ABOUT WHETHER HE WANTS THE ECT THIS MORNING OR NOT BUT WILL LET US KNOW IF HE DOES AROUND 0530.       Problem: Overarching Goals (Adult)  Goal: Adheres to Safety Considerations for Self and Others  Outcome: Ongoing (interventions implemented as appropriate)    Goal: Optimized Coping Skills in Response to Life Stressors  Outcome: Ongoing (interventions implemented as appropriate)    Goal: Develops/Participates in Therapeutic [place] to Support Successful Transition  Outcome: Ongoing (interventions implemented as appropriate)      Problem: Fall Risk (Adult)  Goal: Identify Related Risk Factors and Signs and Symptoms  Outcome: Ongoing (interventions implemented as appropriate)    Goal: Absence of Fall  Outcome: Ongoing (interventions implemented as appropriate)

## 2018-06-19 NOTE — PROGRESS NOTES
"1510  Data:     Therapist reintroduced self to patient, who was agreeable to meet.  Continued to discuss progress and treatment goals.  Educated patient about importance of safety and aftercare plans.  Patient discussed feeling much better today and less depressed.  He reported feeling ready to return home soon.  Patient discussed supportive family and reported improved mood and improved sleep.  Patient discussed plan to return home with spouse and follow up with JEANMARIE Garner.    Assessment:    Patient is observed to display more appropriate affect and \"better\" mood.  Patient denied thoughts to harm himself or others.  He denied hallucinations.  Patient discussed he feels ECT to be helpful.  He appeared oriented x3 and displayed improved insight.    Plan:    Therapist will continue to assist daily with aftercare and safety planning as needed.  Patient consented for aftercare with JEANMARIE Garner.  Navigator to assist.  "

## 2018-06-19 NOTE — OP NOTE
ECT OPERATIVE PROCEDURE REPORT      PATIENT NAME: Ernesto Easley    Assistants: None    Primary Surgeon: JEAN PAUL De Leon MD    Pre-Op Diagnosis:   Major Depression, Single Episode, Severe Without Psychosis    Post- Operative Diagnosis: Same  : 1950    SSN:     MRN#: [id number]    PHYSICIAN: JEAN PAUL DE LEON M.D.    PROCEDURE DATE: 18    PROCEDURE: Bifrontal ECT utilizing Spectrum 5000 q. Machine. Settings at 0.5 ms, 90Hz, 2.0 seconds, and 800 mA. This is the patient's 3 Tx in this series.    ANESTHESIA: See anesthesia report for medications and monitoring.                           Brevital:100        mg          Succinylcholine: 100        mg    DETAILS: Impulse at 1045 with a resultant 80 second seizure.    Specimens Removed: NA  Blood Administered: NA    Complications: None    Pt STATUS STABLE: 1055  HR    Grafts or Implants: NA    Dictated utilizing Dragon dictation

## 2018-06-19 NOTE — ANESTHESIA POSTPROCEDURE EVALUATION
Patient: Ernesto Easley    Procedure Summary     Date:  06/19/18 Room / Location:  [facility] OR 05 /  [facility] OR    Anesthesia Start:  1023 Anesthesia Stop:  1054    Procedure:  ELECTROCONVULSIVE THERAPY; 80 seconds (N/A ) Diagnosis:       Severe single current episode of major depressive disorder, with psychotic features      (Severe single current episode of major depressive disorder, with psychotic features [F32.3])    Surgeon:  Thomas De Leon MD Provider:  Jn Edge MD    Anesthesia Type:  general ASA Status:  2          Anesthesia Type: general  Last vitals  BP   131/84 (06/19/18 1125)   Temp   98.1 °F (36.7 °C) (06/19/18 1125)   Pulse   55 (06/19/18 1125)   Resp   16 (06/19/18 1125)     SpO2   96 % (06/19/18 1125)     Post Anesthesia Care and Evaluation    Patient location during evaluation: PACU  Patient participation: complete - patient participated  Level of consciousness: awake and alert  Pain score: 1  Pain management: adequate  Airway patency: patent  Anesthetic complications: No anesthetic complications  PONV Status: controlled  Cardiovascular status: acceptable  Respiratory status: acceptable  Hydration status: acceptable

## 2018-06-19 NOTE — NURSING NOTE
0881 PATIENT OFF FLOOR FOR ECT. 1135 PATIENT BACK ON FLOOR FROM ECT . PATIENT APPERAS STABLE AND GAG REFLEX IS PRESENT.

## 2018-06-19 NOTE — DISCHARGE INSTR - APPOINTMENTS
[place] [address] [address]  [phone]    June 22 2018 at 10:00am with Dr De Leon      [place] [address] [address] [address] [phone]    June 25 2018 at 8:15am with Vera  June 27 2018 at 8:00am for Med Management.

## 2018-06-19 NOTE — PLAN OF CARE
Problem: Patient Care Overview  Goal: Plan of Care Review  Outcome: Ongoing (interventions implemented as appropriate)      Problem: Overarching Goals (Adult)  Goal: Adheres to Safety Considerations for Self and Others  Outcome: Ongoing (interventions implemented as appropriate)    Goal: Optimized Coping Skills in Response to Life Stressors  Outcome: Ongoing (interventions implemented as appropriate)    Goal: Develops/Participates in Therapeutic [place] to Support Successful Transition  Outcome: Ongoing (interventions implemented as appropriate)      Problem: Fall Risk (Adult)  Goal: Identify Related Risk Factors and Signs and Symptoms  Outcome: Ongoing (interventions implemented as appropriate)    Goal: Absence of Fall  Outcome: Ongoing (interventions implemented as appropriate)

## 2018-06-20 VITALS
RESPIRATION RATE: 18 BRPM | DIASTOLIC BLOOD PRESSURE: 65 MMHG | HEIGHT: 68 IN | BODY MASS INDEX: 27.49 KG/M2 | SYSTOLIC BLOOD PRESSURE: 108 MMHG | TEMPERATURE: 98.1 F | OXYGEN SATURATION: 96 % | HEART RATE: 79 BPM | WEIGHT: 181.4 LBS

## 2018-06-20 PROBLEM — F32.A DEPRESSION WITH SUICIDAL IDEATION: Status: RESOLVED | Noted: 2018-06-08 | Resolved: 2018-06-20

## 2018-06-20 PROBLEM — R45.851 DEPRESSION WITH SUICIDAL IDEATION: Status: RESOLVED | Noted: 2018-06-08 | Resolved: 2018-06-20

## 2018-06-20 PROCEDURE — 99239 HOSP IP/OBS DSCHRG MGMT >30: CPT | Performed by: PSYCHIATRY & NEUROLOGY

## 2018-06-20 RX ORDER — PANTOPRAZOLE SODIUM 40 MG/1
40 TABLET, DELAYED RELEASE ORAL EVERY EVENING
Qty: 30 TABLET | Refills: 0 | Status: SHIPPED | OUTPATIENT
Start: 2018-06-20 | End: 2018-06-20

## 2018-06-20 RX ORDER — ATENOLOL 25 MG/1
25 TABLET ORAL DAILY
Qty: 30 TABLET | Refills: 0 | Status: SHIPPED | OUTPATIENT
Start: 2018-06-21 | End: 2018-06-20

## 2018-06-20 RX ORDER — FLUOXETINE 10 MG/1
30 CAPSULE ORAL DAILY
Qty: 90 CAPSULE | Refills: 0 | Status: SHIPPED | OUTPATIENT
Start: 2018-06-21 | End: 2018-07-05

## 2018-06-20 RX ORDER — ATENOLOL 25 MG/1
25 TABLET ORAL DAILY
Qty: 30 TABLET | Refills: 0 | Status: SHIPPED | OUTPATIENT
Start: 2018-06-21 | End: 2021-02-26

## 2018-06-20 RX ORDER — POLYETHYLENE GLYCOL 3350 17 G/17G
17 POWDER, FOR SOLUTION ORAL DAILY
Qty: 510 G | Refills: 0 | Status: SHIPPED | OUTPATIENT
Start: 2018-06-21 | End: 2018-06-20

## 2018-06-20 RX ORDER — AMLODIPINE BESYLATE 10 MG/1
10 TABLET ORAL
Qty: 30 TABLET | Refills: 0 | Status: SHIPPED | OUTPATIENT
Start: 2018-06-21

## 2018-06-20 RX ORDER — OLANZAPINE 7.5 MG/1
7.5 TABLET ORAL NIGHTLY
Qty: 30 TABLET | Refills: 0 | Status: SHIPPED | OUTPATIENT
Start: 2018-06-20 | End: 2018-07-05

## 2018-06-20 RX ORDER — POLYETHYLENE GLYCOL 3350 17 G/17G
17 POWDER, FOR SOLUTION ORAL DAILY
Qty: 510 G | Refills: 0 | Status: SHIPPED | OUTPATIENT
Start: 2018-06-21 | End: 2021-02-26

## 2018-06-20 RX ORDER — SENNA AND DOCUSATE SODIUM 50; 8.6 MG/1; MG/1
2 TABLET, FILM COATED ORAL DAILY
Qty: 60 TABLET | Refills: 0 | Status: SHIPPED | OUTPATIENT
Start: 2018-06-20 | End: 2021-02-26

## 2018-06-20 RX ORDER — AMLODIPINE BESYLATE 10 MG/1
10 TABLET ORAL
Qty: 30 TABLET | Refills: 0 | Status: SHIPPED | OUTPATIENT
Start: 2018-06-21 | End: 2018-06-20

## 2018-06-20 RX ORDER — OLANZAPINE 7.5 MG/1
7.5 TABLET ORAL NIGHTLY
Qty: 30 TABLET | Refills: 0 | Status: SHIPPED | OUTPATIENT
Start: 2018-06-20 | End: 2018-06-20

## 2018-06-20 RX ORDER — PANTOPRAZOLE SODIUM 40 MG/1
40 TABLET, DELAYED RELEASE ORAL EVERY EVENING
Qty: 30 TABLET | Refills: 0 | Status: SHIPPED | OUTPATIENT
Start: 2018-06-20 | End: 2021-03-19 | Stop reason: HOSPADM

## 2018-06-20 RX ORDER — FLUOXETINE 10 MG/1
30 CAPSULE ORAL DAILY
Qty: 90 CAPSULE | Refills: 0 | Status: SHIPPED | OUTPATIENT
Start: 2018-06-21 | End: 2018-06-20

## 2018-06-20 RX ORDER — SENNA AND DOCUSATE SODIUM 50; 8.6 MG/1; MG/1
2 TABLET, FILM COATED ORAL DAILY
Qty: 60 TABLET | Refills: 0 | Status: SHIPPED | OUTPATIENT
Start: 2018-06-20 | End: 2018-06-20

## 2018-06-20 RX ADMIN — ATENOLOL 25 MG: 25 TABLET ORAL at 08:05

## 2018-06-20 RX ADMIN — FLUOXETINE 30 MG: 20 CAPSULE ORAL at 08:05

## 2018-06-20 RX ADMIN — ALPRAZOLAM 0.5 MG: 0.5 TABLET ORAL at 08:05

## 2018-06-20 RX ADMIN — DOCUSATE SODIUM AND SENNOSIDES 2 TABLET: 8.6; 5 TABLET, FILM COATED ORAL at 08:05

## 2018-06-20 RX ADMIN — ASPIRIN 81 MG: 81 TABLET, CHEWABLE ORAL at 08:05

## 2018-06-20 RX ADMIN — AMLODIPINE BESYLATE 10 MG: 10 TABLET ORAL at 08:05

## 2018-06-20 NOTE — DISCHARGE SUMMARY
Date of Discharge:  6/20/2018    Discharge Diagnosis:Principal Problem:    Severe single current episode of major depressive disorder, with psychotic features  Active Problems:    HTN (hypertension)        Presenting Problem/History of Present Illness: Patient presented in the hospital emergency room depressed with stated suicidal intent and delusions of hopelessness, admitted to the hospital for further evaluation, safety and treatment, admitted on a voluntary basis, see history of present illness and admission note for further details.      Hospital Course:  Patient was admitted for safety and stabilization and was placed on standard precautions.  Routine labs were checked.  Patient was assigned a masters level therapist and provided with an opportunity to participate in group and individual therapy on the unit.  Patient seen on a daily basis for evaluation and supportive therapy.  Patient initially started on a combination of Prozac and Zyprexa but was slow to show any improvement.  After meeting with his family and briefing them as well as the patient on the risk and potential benefits of ECT he subsequently consented to start treatment receiving the initial bifrontal ECT on June 14 and subsequent treatments or June 14 and 19. Patient improved dramatically with resolution of his depression and associated symptomatology.  The hospitalist service was consulted during his hospital stay, they recommended his current medications for hypertension as well as clearing him medically for the ECT treatments.  At discharge patient free of any thoughts of harming self or others, clear of any psychotic thought content, and is felt to be safe for the outpatient status with an appointment to be seen on June 22.  See below for medications recommended and prescribed at discharge.    Consults:   Consults     Date and Time Order Name Status Description    6/11/2018 0833 Inpatient Hospitalist Consult      6/9/2018 1215 Inpatient  Hospitalist Consult Completed           Labs:  Lab Results (all)     Procedure Component Value Units Date/Time    Basic Metabolic Panel [[id number]]  (Abnormal) Collected:  06/19/18 0441    Specimen:  Blood Updated:  06/19/18 0620     Glucose 81 mg/dL      BUN 17 mg/dL      Creatinine 1.22 mg/dL      Sodium 140 mmol/L      Potassium 3.9 mmol/L      Chloride 107 mmol/L      CO2 26.7 mmol/L      Calcium 9.2 mg/dL      eGFR Non African Amer 60 (L) mL/min/1.73      BUN/Creatinine Ratio 13.9     Anion Gap 6.3 mmol/L     Narrative:       GFR Normal >60  Chronic Kidney Disease <60  Kidney Failure <15    Osmolality, Calculated [[id number]]  (Normal) Collected:  06/19/18 0441    Specimen:  Blood Updated:  06/19/18 0620     Osmolality Calc 280.0 mOsm/kg     Basic Metabolic Panel [[id number]]  (Abnormal) Collected:  06/17/18 0832    Specimen:  Blood Updated:  06/17/18 0924     Glucose 111 (H) mg/dL      BUN 14 mg/dL      Creatinine 1.20 mg/dL      Sodium 142 mmol/L      Potassium 3.9 mmol/L      Chloride 108 mmol/L      CO2 30.3 mmol/L      Calcium 9.3 mg/dL      eGFR Non African Amer 61 mL/min/1.73      BUN/Creatinine Ratio 11.7     Anion Gap 3.7 mmol/L     Narrative:       GFR Normal >60  Chronic Kidney Disease <60  Kidney Failure <15    Osmolality, Calculated [[id number]]  (Normal) Collected:  06/17/18 0832    Specimen:  Blood Updated:  06/17/18 0924     Osmolality Calc 284.3 mOsm/kg     Osmolality, Calculated [[id number]]  (Normal) Collected:  06/16/18 1032    Specimen:  Blood Updated:  06/16/18 1119     Osmolality Calc 276.7 mOsm/kg     Basic Metabolic Panel [[id number]]  (Normal) Collected:  06/16/18 1032    Specimen:  Blood Updated:  06/16/18 1119     Glucose 96 mg/dL      BUN 16 mg/dL      Creatinine 1.17 mg/dL      Sodium 138 mmol/L      Potassium 4.4 mmol/L      Comment: 1+ Hemolysis         Chloride 106 mmol/L      CO2 28.1 mmol/L      Calcium 8.8 mg/dL      eGFR Non African Amer 63 mL/min/1.73      BUN/Creatinine Ratio  13.7     Anion Gap 3.9 mmol/L     Narrative:       GFR Normal >60  Chronic Kidney Disease <60  Kidney Failure <15    Basic Metabolic Panel [[id number]]  (Abnormal) Collected:  06/15/18 0446    Specimen:  Blood Updated:  06/15/18 0604     Glucose 89 mg/dL      BUN 17 mg/dL      Creatinine 1.31 (H) mg/dL      Sodium 141 mmol/L      Potassium 3.7 mmol/L      Chloride 107 mmol/L      CO2 28.6 mmol/L      Calcium 8.9 mg/dL      eGFR Non African Amer 55 (L) mL/min/1.73      BUN/Creatinine Ratio 13.0     Anion Gap 5.4 mmol/L     Narrative:       GFR Normal >60  Chronic Kidney Disease <60  Kidney Failure <15    Osmolality, Calculated [[id number]]  (Normal) Collected:  06/15/18 0446    Specimen:  Blood Updated:  06/15/18 0604     Osmolality Calc 282.3 mOsm/kg     Basic Metabolic Panel [[id number]]  (Abnormal) Collected:  06/14/18 0550    Specimen:  Blood Updated:  06/14/18 0700     Glucose 92 mg/dL      BUN 20 mg/dL      Creatinine 1.25 mg/dL      Sodium 142 mmol/L      Potassium 4.1 mmol/L      Chloride 105 mmol/L      CO2 28.7 mmol/L      Calcium 9.2 mg/dL      eGFR Non African Amer 58 (L) mL/min/1.73      BUN/Creatinine Ratio 16.0     Anion Gap 8.3 mmol/L     Narrative:       GFR Normal >60  Chronic Kidney Disease <60  Kidney Failure <15    Osmolality, Calculated [[id number]]  (Normal) Collected:  06/14/18 0550    Specimen:  Blood Updated:  06/14/18 0700     Osmolality Calc 285.4 mOsm/kg     Basic Metabolic Panel [[id number]]  (Abnormal) Collected:  06/13/18 0851    Specimen:  Blood Updated:  06/13/18 0926     Glucose 97 mg/dL      BUN 20 mg/dL      Creatinine 1.29 mg/dL      Sodium 138 mmol/L      Potassium 4.0 mmol/L      Chloride 106 mmol/L      CO2 27.7 mmol/L      Calcium 9.4 mg/dL      eGFR Non African Amer 56 (L) mL/min/1.73      BUN/Creatinine Ratio 15.5     Anion Gap 4.3 mmol/L     Narrative:       GFR Normal >60  Chronic Kidney Disease <60  Kidney Failure <15    Osmolality, Calculated [[id number]]  (Normal) Collected:   06/13/18 0851    Specimen:  Blood Updated:  06/13/18 0926     Osmolality Calc 278.2 mOsm/kg     Basic Metabolic Panel [[id number]]  (Abnormal) Collected:  06/12/18 0448    Specimen:  Blood Updated:  06/12/18 0618     Glucose 93 mg/dL      BUN 17 mg/dL      Creatinine 1.30 (H) mg/dL      Sodium 140 mmol/L      Potassium 3.7 mmol/L      Chloride 105 mmol/L      CO2 26.9 mmol/L      Calcium 9.4 mg/dL      eGFR Non African Amer 55 (L) mL/min/1.73      BUN/Creatinine Ratio 13.1     Anion Gap 8.1 mmol/L     Narrative:       GFR Normal >60  Chronic Kidney Disease <60  Kidney Failure <15    Osmolality, Calculated [[id number]]  (Normal) Collected:  06/12/18 0448    Specimen:  Blood Updated:  06/12/18 0618     Osmolality Calc 280.6 mOsm/kg     CBC & Differential [[id number]] Collected:  06/12/18 0448    Specimen:  Blood Updated:  06/12/18 0546    Narrative:       The following orders were created for panel order CBC & Differential.  Procedure                               Abnormality         Status                     ---------                               -----------         ------                     CBC Auto Differential[[id number]]        Abnormal            Final result                 Please view results for these tests on the individual orders.    CBC Auto Differential [[id number]]  (Abnormal) Collected:  06/12/18 0448    Specimen:  Blood Updated:  06/12/18 0546     WBC 7.81 10*3/mm3      RBC 4.62 (L) 10*6/mm3      Hemoglobin 14.8 g/dL      Hematocrit 42.6 %      MCV 92.2 fL      MCH 32.0 pg      MCHC 34.7 g/dL      RDW 13.4 %      RDW-SD 44.3 fl      MPV 9.9 fL      Platelets 276 10*3/mm3      Neutrophil % 53.2 %      Lymphocyte % 30.9 %      Monocyte % 12.2 (H) %      Eosinophil % 2.8 %      Basophil % 0.8 %      Immature Grans % 0.1 %      Neutrophils, Absolute 4.16 10*3/mm3      Lymphocytes, Absolute 2.41 10*3/mm3      Monocytes, Absolute 0.95 (H) 10*3/mm3      Eosinophils, Absolute 0.22 10*3/mm3      Basophils,  Absolute 0.06 10*3/mm3      Immature Grans, Absolute 0.01 10*3/mm3     Basic Metabolic Panel [[id number]]  (Abnormal) Collected:  06/11/18 0514    Specimen:  Blood Updated:  06/11/18 0625     Glucose 94 mg/dL      BUN 16 mg/dL      Creatinine 1.11 mg/dL      Sodium 137 mmol/L      Potassium 3.7 mmol/L      Chloride 106 mmol/L      CO2 28.0 mmol/L      Calcium 9.3 mg/dL      eGFR Non African Amer 66 mL/min/1.73      BUN/Creatinine Ratio 14.4     Anion Gap 3.0 (L) mmol/L     Narrative:       GFR Normal >60  Chronic Kidney Disease <60  Kidney Failure <15    Osmolality, Calculated [[id number]]  (Normal) Collected:  06/11/18 0514    Specimen:  Blood Updated:  06/11/18 0625     Osmolality Calc 274.8 mOsm/kg     Basic Metabolic Panel [[id number]]  (Abnormal) Collected:  06/10/18 0506    Specimen:  Blood Updated:  06/10/18 0612     Glucose 97 mg/dL      BUN 27 (H) mg/dL      Creatinine 1.46 (H) mg/dL      Sodium 140 mmol/L      Potassium 4.0 mmol/L      Chloride 106 mmol/L      CO2 27.9 mmol/L      Calcium 9.0 mg/dL      eGFR Non African Amer 48 (L) mL/min/1.73      BUN/Creatinine Ratio 18.5     Anion Gap 6.1 mmol/L     Narrative:       GFR Normal >60  Chronic Kidney Disease <60  Kidney Failure <15    Osmolality, Calculated [[id number]]  (Normal) Collected:  06/10/18 0506    Specimen:  Blood Updated:  06/10/18 0612     Osmolality Calc 284.4 mOsm/kg     CBC & Differential [[id number]] Collected:  06/09/18 1355    Specimen:  Blood Updated:  06/09/18 1409    Narrative:       The following orders were created for panel order CBC & Differential.  Procedure                               Abnormality         Status                     ---------                               -----------         ------                     CBC Auto Differential[[id number]]        Abnormal            Final result                 Please view results for these tests on the individual orders.    CBC Auto Differential [[id number]]  (Abnormal) Collected:  06/09/18  1355    Specimen:  Blood Updated:  06/09/18 1409     WBC 9.36 10*3/mm3      RBC 4.69 (L) 10*6/mm3      Hemoglobin 15.4 g/dL      Hematocrit 43.2 %      MCV 92.1 fL      MCH 32.8 pg      MCHC 35.6 g/dL      RDW 13.7 %      RDW-SD 44.6 fl      MPV 9.7 fL      Platelets 329 10*3/mm3      Neutrophil % 56.3 %      Lymphocyte % 27.1 %      Monocyte % 13.9 (H) %      Eosinophil % 1.9 %      Basophil % 0.6 %      Immature Grans % 0.2 %      Neutrophils, Absolute 5.26 10*3/mm3      Lymphocytes, Absolute 2.54 10*3/mm3      Monocytes, Absolute 1.30 (H) 10*3/mm3      Eosinophils, Absolute 0.18 10*3/mm3      Basophils, Absolute 0.06 10*3/mm3      Immature Grans, Absolute 0.02 10*3/mm3     T4, Free [[id number]]  (Normal) Collected:  06/09/18 0757    Specimen:  Blood Updated:  06/09/18 0945     Free T4 1.36 ng/dL     Hemoglobin A1c [[id number]]  (Normal) Collected:  06/09/18 0757    Specimen:  Blood Updated:  06/09/18 0905     Hemoglobin A1C 5.00 %     TSH [[id number]]  (Normal) Collected:  06/09/18 0757    Specimen:  Blood Updated:  06/09/18 0901     TSH 2.651 mIU/mL     Lipid Panel [[id number]]  (Abnormal) Collected:  06/09/18 0757    Specimen:  Blood Updated:  06/09/18 0852     Total Cholesterol 178 mg/dL      Triglycerides 119 mg/dL      HDL Cholesterol 39 (L) mg/dL      LDL Cholesterol  115 (H) mg/dL      VLDL Cholesterol 23.8 mg/dL      LDL/HDL Ratio 2.95    Narrative:       Cholesterol Reference Ranges  (U.[place]. Department of Health and Human Services ATP III Classifications)    Desirable          <200 mg/dL  Borderline High    200-239 mg/dL  High Risk          >240 mg/dL      Triglyceride Reference Ranges  (U.S. Department of Health and Human Services ATP III Classifications)    Normal           <150 mg/dL  Borderline High  150-199 mg/dL  High             200-499 mg/dL  Very High        >500 mg/dL    HDL Reference Ranges  (U.S. Department of Health and Human Services ATP III Classifcations)    Low     <40 mg/dl (major risk factor  for CHD)  High    >60 mg/dl ('negative' risk factor for CHD)        LDL Reference Ranges  (U.S. Department of Health and Human Services ATP III Classifcations)    Optimal          <100 mg/dL  Near Optimal     100-129 mg/dL  Borderline High  130-159 mg/dL  High             160-189 mg/dL  Very High        >189 mg/dL    Basic Metabolic Panel [[id number]]  (Abnormal) Collected:  06/09/18 0757    Specimen:  Blood Updated:  06/09/18 0852     Glucose 129 (H) mg/dL      BUN 21 mg/dL      Creatinine 1.37 (H) mg/dL      Sodium 139 mmol/L      Potassium 3.8 mmol/L      Chloride 102 mmol/L      CO2 28.5 mmol/L      Calcium 9.9 mg/dL      eGFR Non African Amer 52 (L) mL/min/1.73      BUN/Creatinine Ratio 15.3     Anion Gap 8.5 mmol/L     Narrative:       GFR Normal >60  Chronic Kidney Disease <60  Kidney Failure <15    Osmolality, Calculated [[id number]]  (Normal) Collected:  06/09/18 0757    Specimen:  Blood Updated:  06/09/18 0852     Osmolality Calc 282.2 mOsm/kg           Imaging:  Imaging Results (all)     Procedure Component Value Units Date/Time    XR Spine Lumbar Lateral [[id number]] Collected:  06/11/18 1051     Updated:  06/11/18 1141    Narrative:       EXAMINATION: XR SPINE LUMBAR LATERAL-      CLINICAL INDICATION: PAIN      TECHNIQUE: Lateral X-rays of the lumbosacral spine.      COMPARISON: NONE.      FINDINGS: There is multilevel degenerative disc disease. Mild anterior  osteophyte formation is noted. Atherosclerotic calcification is seen in  the aorta. There is mild facet arthropathy. Vertebral body height and  alignment are maintained. No radiographic evidence of acute compression  fracture.       Impression:       Multilevel degenerative disc disease. No radiographic  evidence of acute compression fracture of the lumbar spine.     This report was finalized on 6/11/2018 10:52 AM by Dr. Gera Chacon MD.                     Condition on Discharge:  improved    Prognosis: favorable    Vital Signs  Temp:  [97.3 °F  (36.3 °C)-98.9 °F (37.2 °C)] 98.1 °F (36.7 °C)  Heart Rate:  [52-92] 79  Resp:  [9-18] 18  BP: ()/() 108/65    Discharge Disposition  Home or Self Care    Discharge Medications     Discharge Medications      New Medications      Instructions Start Date   FLUoxetine 10 MG capsule  Commonly known as:  PROzac   30 mg, Oral, Daily   Start Date:  6/21/2018     OLANZapine 7.5 MG tablet  Commonly known as:  zyPREXA   7.5 mg, Oral, Nightly      polyethylene glycol pack packet  Commonly known as:  MIRALAX   17 g, Oral, Daily   Start Date:  6/21/2018     sennosides-docusate sodium 8.6-50 MG tablet  Commonly known as:  SENOKOT-S   2 tablets, Oral, 2 Times Daily         Changes to Medications      Instructions Start Date   amLODIPine 10 MG tablet  Commonly known as:  NORVASC  What changed:  when to take this   10 mg, Oral, Every 24 Hours Scheduled   Start Date:  6/21/2018        Continue These Medications      Instructions Start Date   aspirin 81 MG chewable tablet   81 mg, Oral, Daily      atenolol 25 MG tablet  Commonly known as:  TENORMIN   25 mg, Oral, Daily   Start Date:  6/21/2018     pantoprazole 40 MG EC tablet  Commonly known as:  PROTONIX   40 mg, Oral, Every Evening         Stop These Medications    ALPRAZolam 0.5 MG tablet  Commonly known as:  XANAX     CloNIDine 0.1 MG tablet  Commonly known as:  CATAPRES     irbesartan-hydrochlorothiazide 150-12.5 MG tablet  Commonly known as:  AVALIDE     PARoxetine 20 MG tablet  Commonly known as:  PAXIL            Discharge Diet: regular    Activity at Discharge: patient advised not to drive for 10 days.     Follow-up Appointments:    Future Appointments  Date Time Provider Department Center   6/22/2018 10:00 AM Thomas De Leon MD [facility] None         Thomas De Leon MD  06/20/18  9:57 AM  Time spent with the discharge process >30 minutes.     Dictated utilizing Dragon dictation

## 2018-06-20 NOTE — PLAN OF CARE
Problem: Patient Care Overview  Goal: Plan of Care Review  Outcome: Ongoing (interventions implemented as appropriate)   06/19/18 1577   Coping/Psychosocial   Plan of Care Reviewed With patient   Coping/Psychosocial   Patient Agreement with Plan of Care agrees   Plan of Care Review   Progress improving   OTHER   Outcome Summary PT DENIED ANXIETY/DEPRESSION/SI/HI/HALLUCINATIONS, REPORTED FEELING GOOD & WANTING TO GO HOME TOMORROW.       Problem: Overarching Goals (Adult)  Goal: Adheres to Safety Considerations for Self and Others  Outcome: Ongoing (interventions implemented as appropriate)    Goal: Optimized Coping Skills in Response to Life Stressors  Outcome: Ongoing (interventions implemented as appropriate)    Goal: Develops/Participates in Therapeutic [place] to Support Successful Transition  Outcome: Ongoing (interventions implemented as appropriate)      Problem: Fall Risk (Adult)  Goal: Identify Related Risk Factors and Signs and Symptoms  Outcome: Ongoing (interventions implemented as appropriate)    Goal: Absence of Fall  Outcome: Ongoing (interventions implemented as appropriate)

## 2018-06-22 ENCOUNTER — OFFICE VISIT (OUTPATIENT)
Dept: PSYCHIATRY | Facility: CLINIC | Age: 68
End: 2018-06-22

## 2018-06-22 VITALS
HEART RATE: 63 BPM | WEIGHT: 187 LBS | HEIGHT: 71 IN | SYSTOLIC BLOOD PRESSURE: 157 MMHG | BODY MASS INDEX: 26.18 KG/M2 | DIASTOLIC BLOOD PRESSURE: 87 MMHG

## 2018-06-22 DIAGNOSIS — F32.5 MAJOR DEPRESSIVE DISORDER WITH SINGLE EPISODE, IN FULL REMISSION (HCC): Primary | ICD-10-CM

## 2018-06-22 PROCEDURE — 99213 OFFICE O/P EST LOW 20 MIN: CPT | Performed by: PSYCHIATRY & NEUROLOGY

## 2018-06-22 NOTE — PROGRESS NOTES
"Patient ID: Ernesto Easley is a 67 y.o. male    SERVICE TYPE: EVALUATION AND MANAGEMENT (greater than 50% of the time spent for supportive psychotherapy).      /87   Pulse 63   Ht 181 cm (71.26\")   Wt 84.8 kg (187 lb)   BMI 25.89 kg/m²     ALLERGIES:  Patient has no known allergies.    CC: \"Good to be home\"    FOLLOWED FOR: Depression.     HPI: Hospitalized 6/7 to 6/20. 3 ECT. Discharge psychiatric meds: Prozac and Zyprexa.     Seen with his wife today. Sleeping and appetite normal. No talk of having CA, or thoughts of self harm or being better off dead.  Mood and behavior normal. Doing suprisingly well.   Good recall for recent events. No medication complaints.   Not to drive for another week.     PFSH: wife and family very supportive.     Review of Systems   Respiratory: Negative.    Cardiovascular: Negative.    Gastrointestinal: Negative.    Musculoskeletal: Negative.    Neurological: Negative.        SUPPORTIVE PSYCHOTHERAPY: continuing efforts to promote the therapeutic alliance, address the patient’s issues, and strengthen self awareness, insights, and coping skills.       Mental Status Exam  Appearance:  clean and casually dressed, appropriate  Attitude toward clinician:  cooperative and agreeable   Speech:    Rate:  regular rate and rhythm   Volume: normal  Motor:  no abnormal movements present  Mood:  Good  Affect:  Euthymic, cheerful, joking.   Thought Processes:  linear, logical, and goal directed  Thought Content:  Normal   Feeling Hopeless: absent  Suicidal Thoughts:  absent  Homicidal Thoughts:  absent  Perceptual Disturbance: no perceptual disturbance  Attention and Concentration:  good  Insight and Judgement:  good  Memory:  memory appears to be intact    LABS:   Recent Results (from the past 168 hour(s))   Basic Metabolic Panel    Collection Time: 06/16/18 10:32 AM   Result Value Ref Range    Glucose 96 70 - 110 mg/dL    BUN 16 7 - 21 mg/dL    Creatinine 1.17 0.43 - 1.29 mg/dL    Sodium 138 " 135 - 153 mmol/L    Potassium 4.4 3.5 - 5.3 mmol/L    Chloride 106 99 - 112 mmol/L    CO2 28.1 24.3 - 31.9 mmol/L    Calcium 8.8 7.7 - 10.0 mg/dL    eGFR Non African Amer 63 >60 mL/min/1.73    BUN/Creatinine Ratio 13.7 7.0 - 25.0    Anion Gap 3.9 3.6 - 11.2 mmol/L   Osmolality, Calculated    Collection Time: 06/16/18 10:32 AM   Result Value Ref Range    Osmolality Calc 276.7 273.0 - 305.0 mOsm/kg   Basic Metabolic Panel    Collection Time: 06/17/18  8:32 AM   Result Value Ref Range    Glucose 111 (H) 70 - 110 mg/dL    BUN 14 7 - 21 mg/dL    Creatinine 1.20 0.43 - 1.29 mg/dL    Sodium 142 135 - 153 mmol/L    Potassium 3.9 3.5 - 5.3 mmol/L    Chloride 108 99 - 112 mmol/L    CO2 30.3 24.3 - 31.9 mmol/L    Calcium 9.3 7.7 - 10.0 mg/dL    eGFR Non African Amer 61 >60 mL/min/1.73    BUN/Creatinine Ratio 11.7 7.0 - 25.0    Anion Gap 3.7 3.6 - 11.2 mmol/L   Osmolality, Calculated    Collection Time: 06/17/18  8:32 AM   Result Value Ref Range    Osmolality Calc 284.3 273.0 - 305.0 mOsm/kg   Basic Metabolic Panel    Collection Time: 06/19/18  4:41 AM   Result Value Ref Range    Glucose 81 70 - 110 mg/dL    BUN 17 7 - 21 mg/dL    Creatinine 1.22 0.43 - 1.29 mg/dL    Sodium 140 135 - 153 mmol/L    Potassium 3.9 3.5 - 5.3 mmol/L    Chloride 107 99 - 112 mmol/L    CO2 26.7 24.3 - 31.9 mmol/L    Calcium 9.2 7.7 - 10.0 mg/dL    eGFR Non African Amer 60 (L) >60 mL/min/1.73    BUN/Creatinine Ratio 13.9 7.0 - 25.0    Anion Gap 6.3 3.6 - 11.2 mmol/L   Osmolality, Calculated    Collection Time: 06/19/18  4:41 AM   Result Value Ref Range    Osmolality Calc 280.0 273.0 - 305.0 mOsm/kg       MEDICATION ISSUES: Have discussed with the patient the medications Risks, Benefits, and Side effects including potential falls, possible impaired driving and  metabolic adversities among others. No medication side effects or related complaints today.     TREATMENT PLAN/GOALS: Continue supportive psychotherapy efforts and medications as indicated.      Current Outpatient Prescriptions   Medication Sig Dispense Refill   • amLODIPine (NORVASC) 10 MG tablet Take 1 tablet by mouth Daily. 30 tablet 0   • aspirin 81 MG chewable tablet Chew 81 mg Daily.     • Started back on the Lisinopril/HCTZ by his PCP - to d/c the atenolol at least temporarily, continue the Norvasc, and supplement his diet with Potassium       • FLUoxetine (PROzac) 10 MG capsule Take 3 capsules by mouth Daily. 90 capsule 0   • OLANZapine (zyPREXA) 7.5 MG tablet Take 1 tablet by mouth Every Night. 30 tablet 0   • pantoprazole (PROTONIX) 40 MG EC tablet Take 1 tablet by mouth Every Evening. 30 tablet 0   • polyethylene glycol (MIRALAX) powder Mix 1 capful in 8 ounces of liquid and drink Daily. 510 g 0   • sennosides-docusate sodium (SENOKOT-S) 8.6-50 MG tablet Take 2 tablets by mouth Daily. 60 tablet 0     No current facility-administered medications for this visit.        COLLATERAL PSYCHOTHERAPEUTIC INTERVENTION: patient not interested in additional psychotherapy.     Encounter Diagnosis   Name Primary?   • Major depressive disorder with single episode, in full remission Yes       Return in about 3 weeks (around 7/13/2018).  Patient knows to call if symptoms worsen or fail to improve between appointments.     Dictated utilizing Dragon dictation

## 2018-07-05 ENCOUNTER — OFFICE VISIT (OUTPATIENT)
Dept: PSYCHIATRY | Facility: CLINIC | Age: 68
End: 2018-07-05

## 2018-07-05 VITALS
DIASTOLIC BLOOD PRESSURE: 89 MMHG | WEIGHT: 197 LBS | HEIGHT: 71 IN | BODY MASS INDEX: 27.58 KG/M2 | HEART RATE: 57 BPM | SYSTOLIC BLOOD PRESSURE: 146 MMHG

## 2018-07-05 DIAGNOSIS — F32.5 MAJOR DEPRESSIVE DISORDER WITH SINGLE EPISODE, IN FULL REMISSION (HCC): Primary | ICD-10-CM

## 2018-07-05 PROCEDURE — 99213 OFFICE O/P EST LOW 20 MIN: CPT | Performed by: PSYCHIATRY & NEUROLOGY

## 2018-07-05 RX ORDER — OLANZAPINE 5 MG/1
TABLET ORAL
Qty: 30 TABLET | Refills: 0 | Status: SHIPPED | OUTPATIENT
Start: 2018-07-05 | End: 2018-08-09

## 2018-07-05 RX ORDER — FLUOXETINE HYDROCHLORIDE 20 MG/1
20 CAPSULE ORAL DAILY
Qty: 30 CAPSULE | Refills: 0 | Status: SHIPPED | OUTPATIENT
Start: 2018-07-05 | End: 2018-08-09 | Stop reason: SDUPTHER

## 2018-07-05 RX ORDER — IRBESARTAN AND HYDROCHLOROTHIAZIDE 150; 12.5 MG/1; MG/1
2 TABLET, FILM COATED ORAL EVERY MORNING
Refills: 5 | COMMUNITY
Start: 2018-06-27 | End: 2021-03-19 | Stop reason: HOSPADM

## 2018-07-05 RX ORDER — TRAZODONE HYDROCHLORIDE 50 MG/1
50 TABLET ORAL NIGHTLY
Qty: 30 TABLET | Refills: 0 | Status: SHIPPED | OUTPATIENT
Start: 2018-07-05 | End: 2018-08-09

## 2018-07-05 NOTE — PROGRESS NOTES
"Patient ID: Ernesto Easley is a 67 y.o. male    SERVICE TYPE: EVALUATION AND MANAGEMENT (greater than 50% of the time spent for supportive psychotherapy).      /89   Pulse 57   Ht 181 cm (71.26\")   Wt 89.4 kg (197 lb)   BMI 27.28 kg/m²  Weight up 10 lbs.     ALLERGIES:  Patient has no known allergies.    CC: \"doing well\"    FOLLOWED FOR: Depression.     HPI: Seen with his wife, mood stable, interest, activities, concentration normal. Problem with intermittent insomnia averaging 4-5 hours per night but napping some during the day. No intrusive thoughts of having CA. Avoids crowds. No history of Panic Attaches. Appetitive good/ weight up 10 lbs/ no family history of Diabetes.     Might consider a daily exercise, talked about low CHO diet. Wife and patient enthusiastic.     To taper off the Zyprexa during the next month, increased the dose of Prozac and adding in low-dose trazodone at bedtime    PFSH:home life stable      Review of Systems   Respiratory: Negative.    Cardiovascular: Negative.    Gastrointestinal: Negative.    Musculoskeletal: Negative.    Neurological: Negative.        SUPPORTIVE PSYCHOTHERAPY: continuing efforts to promote the therapeutic alliance, address the patient’s issues, and strengthen self awareness, insights, and coping skills.       Mental Status Exam  Appearance:  clean and casually dressed, appropriate  Attitude toward clinician:  cooperative and agreeable   Speech:    Rate:  regular rate and rhythm   Volume: normal  Motor:  no abnormal movements present  Mood:  Good  Affect:  euthymic  Thought Processes:  linear, logical, and goal directed  Thought Content:  Normal   Feeling Hopeless: absent  Suicidal Thoughts:  absent  Homicidal Thoughts:  absent  Perceptual Disturbance: no perceptual disturbance  Attention and Concentration:  good  Insight and Judgement:  good  Memory:  memory appears to be intact    LABS: No results found for this or any previous visit (from the past 168 " hour(s)).    MEDICATION ISSUES: Have discussed with the patient the medications Risks, Benefits, and Side effects including potential falls, possible impaired driving and  metabolic adversities among others. No medication side effects or related complaints today.     TREATMENT PLAN/GOALS: Continue supportive psychotherapy efforts and medications as indicated.     Current Outpatient Prescriptions   Medication Sig Dispense Refill   • amLODIPine (NORVASC) 10 MG tablet Take 1 tablet by mouth Daily. 30 tablet 0   • aspirin 81 MG chewable tablet Chew 81 mg Daily.     • atenolol (TENORMIN) 25 MG tablet Take 1 tablet by mouth Daily. 30 tablet 0   • irbesartan-hydrochlorothiazide (AVALIDE) 150-12.5 MG tablet Daily.  5   • pantoprazole (PROTONIX) 40 MG EC tablet Take 1 tablet by mouth Every Evening. 30 tablet 0   • polyethylene glycol (MIRALAX) powder Mix 1 capful in 8 ounces of liquid and drink Daily. 510 g 0   • sennosides-docusate sodium (SENOKOT-S) 8.6-50 MG tablet Take 2 tablets by mouth Daily. 60 tablet 0   • FLUoxetine (PROzac) 20 MG capsule Take 1 capsule by mouth Daily. 30 capsule 0   • OLANZapine (ZYPREXA) 5 MG tablet Take 1 daily at night time for 2 weeks then reduce dose to a half of tablet. 30 tablet 0   • traZODone (DESYREL) 50 MG tablet Take 1 tablet by mouth Every Night. 30 tablet 0     No current facility-administered medications for this visit.        COLLATERAL PSYCHOTHERAPEUTIC INTERVENTION: patient not interested in additional psychotherapy.    RISK:  Low to moderate    Encounter Diagnosis   Name Primary?   • Major depressive disorder with single episode, in full remission (CMS/[facility]) Yes       Return in about 4 weeks (around 8/2/2018).  Patient knows to call if symptoms worsen or fail to improve between appointments.     Dictated utilizing Dragon dictation

## 2018-08-09 ENCOUNTER — OFFICE VISIT (OUTPATIENT)
Dept: PSYCHIATRY | Facility: CLINIC | Age: 68
End: 2018-08-09

## 2018-08-09 VITALS
HEART RATE: 51 BPM | BODY MASS INDEX: 28.98 KG/M2 | WEIGHT: 207 LBS | HEIGHT: 71 IN | SYSTOLIC BLOOD PRESSURE: 132 MMHG | DIASTOLIC BLOOD PRESSURE: 81 MMHG

## 2018-08-09 DIAGNOSIS — F32.5 MAJOR DEPRESSIVE DISORDER WITH SINGLE EPISODE, IN FULL REMISSION (HCC): Primary | ICD-10-CM

## 2018-08-09 PROCEDURE — 99213 OFFICE O/P EST LOW 20 MIN: CPT | Performed by: PSYCHIATRY & NEUROLOGY

## 2018-08-09 RX ORDER — FLUOXETINE HYDROCHLORIDE 20 MG/1
20 CAPSULE ORAL DAILY
Qty: 90 CAPSULE | Refills: 0 | Status: SHIPPED | OUTPATIENT
Start: 2018-08-09 | End: 2018-10-26 | Stop reason: SDUPTHER

## 2018-08-09 NOTE — PROGRESS NOTES
"Patient ID: Ernesto Easley is a 68 y.o. male    SERVICE TYPE: EVALUATION AND MANAGEMENT (greater than 50% of the time spent for supportive psychotherapy).      /81   Pulse 51   Ht 181 cm (71.26\")   Wt 93.9 kg (207 lb)   BMI 28.66 kg/m²     ALLERGIES:  Patient has no known allergies.    CC: \"Doing well\"     FOLLOWED FOR: depression    HPI: no recurrence of depressive symptoms, doing well. Interest, activity, sleep, concentration all normal.   Has not been taking the Trazodone, and has reduced the Zyprexa to 2.5 mg daily - to d/c.     PFSH: working part time and walking daily with his wife.     Review of Systems   Respiratory: Negative.    Cardiovascular: Negative.    Gastrointestinal: Negative.    Musculoskeletal:        Has noticed dependent edema lower extremities, will be seeing his cardiologist.        SUPPORTIVE PSYCHOTHERAPY: continuing efforts to promote the therapeutic alliance, address the patient’s issues, and strengthen self awareness, insights, and coping skills.       Mental Status Exam  Appearance:  clean and casually dressed, appropriate  Attitude toward clinician:  cooperative and agreeable   Speech:    Rate:  regular rate and rhythm   Volume: normal  Motor:  no abnormal movements present  Mood:  Good  Affect:  euthymic  Thought Processes:  linear, logical, and goal directed  Thought Content:  Normal   Feeling Hopeless: absent  Suicidal Thoughts:  absent  Homicidal Thoughts:  absent  Perceptual Disturbance: no perceptual disturbance  Attention and Concentration:  good  Insight and Judgement:  good  Memory:  memory appears to be intact    LABS: No results found for this or any previous visit (from the past 168 hour(s)).    MEDICATION ISSUES: Have discussed with the patient the medications Risks, Benefits, and Side effects including potential falls, possible impaired driving and  metabolic adversities among others. No medication side effects or related complaints today.     TREATMENT PLAN/GOALS: " Continue supportive psychotherapy efforts and medications as indicated.     Current Outpatient Prescriptions   Medication Sig Dispense Refill   • amLODIPine (NORVASC) 10 MG tablet Take 1 tablet by mouth Daily.     • aspirin 81 MG chewable tablet Chew 81 mg Daily.     • atenolol (TENORMIN) 25 MG tablet Take 1 tablet by mouth Daily.     • FLUoxetine (PROzac) 20 MG capsule Take 1 capsule by mouth Daily. 90 capsule 0   • irbesartan-hydrochlorothiazide (AVALIDE) 150-12.5 MG tablet Daily.     • pantoprazole (PROTONIX) 40 MG EC tablet Take 1 tablet by mouth Every Evening.     • polyethylene glycol (MIRALAX) powder Mix 1 capful in 8 ounces of liquid and drink Daily.     • sennosides-docusate sodium (SENOKOT-S) 8.6-50 MG tablet Take 2 tablets by mouth Daily.       No current facility-administered medications for this visit.        COLLATERAL PSYCHOTHERAPEUTIC INTERVENTION: patient not interested in additional psychotherapy.    RISK:  low    Encounter Diagnosis   Name Primary?   • Major depressive disorder with single episode, in full remission (CMS/[facility]) Yes       Return in about 3 months (around 11/9/2018).  Patient knows to call if symptoms worsen or fail to improve between appointments.     Dictated utilizing Dragon dictation

## 2018-10-27 RX ORDER — FLUOXETINE HYDROCHLORIDE 20 MG/1
CAPSULE ORAL
Qty: 30 CAPSULE | Refills: 0 | Status: SHIPPED | OUTPATIENT
Start: 2018-10-27 | End: 2018-11-15 | Stop reason: SDUPTHER

## 2018-11-15 ENCOUNTER — OFFICE VISIT (OUTPATIENT)
Dept: PSYCHIATRY | Facility: CLINIC | Age: 68
End: 2018-11-15

## 2018-11-15 VITALS
HEART RATE: 53 BPM | WEIGHT: 221 LBS | DIASTOLIC BLOOD PRESSURE: 81 MMHG | HEIGHT: 71 IN | SYSTOLIC BLOOD PRESSURE: 126 MMHG | BODY MASS INDEX: 30.94 KG/M2

## 2018-11-15 DIAGNOSIS — F32.5 MAJOR DEPRESSIVE DISORDER WITH SINGLE EPISODE, IN REMISSION (HCC): Primary | ICD-10-CM

## 2018-11-15 PROCEDURE — 99213 OFFICE O/P EST LOW 20 MIN: CPT | Performed by: PSYCHIATRY & NEUROLOGY

## 2018-11-15 RX ORDER — FLUOXETINE HYDROCHLORIDE 20 MG/1
20 CAPSULE ORAL DAILY
Qty: 90 CAPSULE | Refills: 1 | Status: SHIPPED | OUTPATIENT
Start: 2018-11-15 | End: 2019-03-14 | Stop reason: SDUPTHER

## 2018-11-15 NOTE — PROGRESS NOTES
"Patient ID: Ernesto Easley is a 68 y.o. male    SERVICE TYPE: EVALUATION AND MANAGEMENT (greater than 50% of the time spent for supportive psychotherapy).      /81   Pulse 53   Ht 181 cm (71.26\")   Wt 100 kg (221 lb)   BMI 30.60 kg/m²     ALLERGIES:  Patient has no known allergies.    CC/ Focus of the visit: depression:     HPI: no Recurrence of significant affect he symptomatology, interest activity sleep appetite relationships all of the relative norms.    Wife confirms The patient's assessment of his status.    Discussed how he best stay on the antidepressant medication at least another 5-6 months.    PFSH: At home staying busy with task although has dropped off the exercise routine.      SUPPORTIVE PSYCHOTHERAPY: continuing efforts to promote the therapeutic alliance, address the patient’s issues, and strengthen self awareness, insights, and coping skills.       Mental Status Exam  Appearance:  clean and casually dressed, appropriate  Attitude toward clinician:  cooperative and agreeable   Speech:    Rate:  regular rate and rhythm   Volume: normal  Motor:  no abnormal movements present  Mood:  Good  Affect:  euthymic  Thought Processes:  linear, logical, and goal directed  Thought Content:  Normal   Feeling Hopeless: absent  Suicidal Thoughts:  absent  Homicidal Thoughts:  absent  Perceptual Disturbance: no perceptual disturbance  Attention and Concentration:  good  Insight and Judgement:  good  Memory:  memory appears to be intact    LABS: No results found for this or any previous visit (from the past 168 hour(s)).    MEDICATION ISSUES: Have discussed with the patient the medications Risks, Benefits, and Side effects including potential falls, possible impaired driving and  metabolic adversities among others. No medication side effects or related complaints today.     TREATMENT PLAN/GOALS: Continue supportive psychotherapy efforts and medications as indicated.     Current Outpatient Medications "   Medication Sig Dispense Refill   • amLODIPine (NORVASC) 10 MG tablet Take 1 tablet by mouth Daily. 30 tablet 0   • aspirin 81 MG chewable tablet Chew 81 mg Daily.     • atenolol (TENORMIN) 25 MG tablet Take 1 tablet by mouth Daily. 30 tablet 0   • FLUoxetine (PROzac) 20 MG capsule Take 1 capsule by mouth Daily. 90 capsule 1   • irbesartan-hydrochlorothiazide (AVALIDE) 150-12.5 MG tablet Daily.  5   • pantoprazole (PROTONIX) 40 MG EC tablet Take 1 tablet by mouth Every Evening. 30 tablet 0   • polyethylene glycol (MIRALAX) powder Mix 1 capful in 8 ounces of liquid and drink Daily. 510 g 0   • sennosides-docusate sodium (SENOKOT-S) 8.6-50 MG tablet Take 2 tablets by mouth Daily. 60 tablet 0     No current facility-administered medications for this visit.        COLLATERAL PSYCHOTHERAPEUTIC INTERVENTION: patient not interested in additional psychotherapy.    RISK:  Low to moderate    Encounter Diagnosis   Name Primary?   • Major depressive disorder with single episode, in remission (CMS/HCC) Yes       Return in about 4 months (around 3/15/2019).  Patient knows to call if symptoms worsen or fail to improve between appointments.     Dictated utilizing Dragon dictation

## 2019-03-14 ENCOUNTER — OFFICE VISIT (OUTPATIENT)
Dept: PSYCHIATRY | Facility: CLINIC | Age: 69
End: 2019-03-14

## 2019-03-14 VITALS
HEIGHT: 71 IN | HEART RATE: 51 BPM | DIASTOLIC BLOOD PRESSURE: 81 MMHG | SYSTOLIC BLOOD PRESSURE: 139 MMHG | BODY MASS INDEX: 31.92 KG/M2 | WEIGHT: 228 LBS

## 2019-03-14 DIAGNOSIS — F32.5 MAJOR DEPRESSIVE DISORDER WITH SINGLE EPISODE, IN REMISSION (HCC): Primary | ICD-10-CM

## 2019-03-14 PROCEDURE — 99213 OFFICE O/P EST LOW 20 MIN: CPT | Performed by: PSYCHIATRY & NEUROLOGY

## 2019-03-14 RX ORDER — ERGOCALCIFEROL 1.25 MG/1
CAPSULE ORAL
Refills: 5 | COMMUNITY
Start: 2019-03-11 | End: 2021-02-26

## 2019-03-14 RX ORDER — FLUOXETINE HYDROCHLORIDE 20 MG/1
20 CAPSULE ORAL DAILY
Qty: 90 CAPSULE | Refills: 1 | Status: SHIPPED | OUTPATIENT
Start: 2019-03-14 | End: 2019-08-06 | Stop reason: SDUPTHER

## 2019-03-14 NOTE — PROGRESS NOTES
"Patient ID: Ernesto Easley is a 68 y.o. male    SERVICE TYPE: EVALUATION AND MANAGEMENT (greater than 50% of the time spent for supportive psychotherapy).      /81   Pulse 51   Ht 181 cm (71.26\")   Wt 103 kg (228 lb)   BMI 31.57 kg/m²     ALLERGIES:  Patient has no known allergies.    CC/ Focus of the visit: Depression    HPI: There has been no recurrence of significant depressive symptomatology, patient's activity interest sleep all at their norms, discussed with the patient and his wife continuing the Prozac daily for at least 3 months then perhaps reducing the dose to 1 every other day for a month prior to returning to the office in August.    PFSH: Home life is stable, patient enjoys participating in with his son who coaches a high school baseball team, anticipating a trip to [place] with the team.    Review of Systems   Respiratory: Negative.    Cardiovascular: Negative.    Neurological: Negative.    No intervening medical or surgical issues    SUPPORTIVE PSYCHOTHERAPY: continuing efforts to promote the therapeutic alliance, address the patient’s issues, and strengthen self awareness, insights, and coping skills.       Mental Status Exam  Appearance:  clean and casually dressed, appropriate  Attitude toward clinician:  cooperative and agreeable   Speech:    Rate:  regular rate and rhythm   Volume: normal  Motor:  no abnormal movements present  Mood:  Good  Affect:  euthymic  Thought Processes:  linear, logical, and goal directed  Thought Content:  Normal   Feeling Hopeless: absent  Suicidal Thoughts:  absent  Homicidal Thoughts:  absent  Perceptual Disturbance: no perceptual disturbance  Attention and Concentration:  good  Insight and Judgement:  good  Memory:  memory appears to be intact    LABS: No results found for this or any previous visit (from the past 168 hour(s)).    MEDICATION ISSUES: Have discussed with the patient the medications Risks, Benefits, and Side effects including potential " falls, possible impaired driving and  metabolic adversities among others. No medication side effects or related complaints today.     TREATMENT PLAN/GOALS: Continue supportive psychotherapy efforts and medications as indicated.     Current Outpatient Medications   Medication Sig Dispense Refill   • amLODIPine (NORVASC) 10 MG tablet Take 1 tablet by mouth Daily.     • aspirin 81 MG chewable tablet Chew 81 mg Daily.     • atenolol (TENORMIN) 25 MG tablet Take 1 tablet by mouth Daily.     • FLUoxetine (PROzac) 20 MG capsule Take 1 capsule by mouth Daily. 90 capsule 1   • irbesartan-hydrochlorothiazide (AVALIDE) 150-12.5 MG tablet Daily.     • pantoprazole (PROTONIX) 40 MG EC tablet Take 1 tablet by mouth Every Evening.     • polyethylene glycol (MIRALAX) powder Mix 1 capful in 8 ounces of liquid and drink Daily.     • sennosides-docusate sodium (SENOKOT-S) 8.6-50 MG tablet Take 2 tablets by mouth Daily.     • vitamin D (ERGOCALCIFEROL) 99588 units capsule capsule        No current facility-administered medications for this visit.        COLLATERAL PSYCHOTHERAPEUTIC INTERVENTION:  patient not interested in additional psychotherapy.    RISK: Low    Encounter Diagnosis   Name Primary?   • Major depressive disorder with single episode, in remission (CMS/[facility]) Yes       Return in about 5 months (around 8/14/2019).  Patient knows to call if symptoms worsen or fail to improve between appointments.    Dictated utilizing Dragon dictation

## 2019-05-14 ENCOUNTER — DOCUMENTATION (OUTPATIENT)
Dept: PSYCHIATRY | Facility: CLINIC | Age: 69
End: 2019-05-14

## 2019-05-14 NOTE — PROGRESS NOTES
Mr. Easley is a 68-year-old gentleman who is been followed since a hospitalization in June 2018.  The working diagnosis has been major depressive single episode.  During his hospital stay he did receive ECT, since discharge she has been maintained on Prozac 20 mg daily without recurrence of his depressive symptomatology.  Plan would be to taper his antidepressant over the next 6-12 months if he remains symptom-free.

## 2019-08-06 ENCOUNTER — OFFICE VISIT (OUTPATIENT)
Dept: PSYCHIATRY | Facility: CLINIC | Age: 69
End: 2019-08-06

## 2019-08-06 VITALS
DIASTOLIC BLOOD PRESSURE: 90 MMHG | HEIGHT: 71 IN | SYSTOLIC BLOOD PRESSURE: 130 MMHG | HEART RATE: 58 BPM | BODY MASS INDEX: 32.79 KG/M2 | WEIGHT: 234.2 LBS

## 2019-08-06 DIAGNOSIS — Z79.899 MEDICATION MANAGEMENT: ICD-10-CM

## 2019-08-06 DIAGNOSIS — F32.5 MAJOR DEPRESSIVE DISORDER WITH SINGLE EPISODE, IN REMISSION (HCC): Primary | ICD-10-CM

## 2019-08-06 LAB
AMPHETAMINE CUT-OFF: NORMAL
BENZODIAZIPINE CUT-OFF: NORMAL
BUPRENORPHINE CUT-OFF: NORMAL
COCAINE CUT-OFF: NORMAL
EXTERNAL AMPHETAMINE SCREEN URINE: NEGATIVE
EXTERNAL BENZODIAZEPINE SCREEN URINE: NEGATIVE
EXTERNAL BUPRENORPHINE SCREEN URINE: NEGATIVE
EXTERNAL COCAINE SCREEN URINE: NEGATIVE
EXTERNAL MDMA: NEGATIVE
EXTERNAL METHADONE SCREEN URINE: NEGATIVE
EXTERNAL METHAMPHETAMINE SCREEN URINE: NEGATIVE
EXTERNAL OPIATES SCREEN URINE: NEGATIVE
EXTERNAL OXYCODONE SCREEN URINE: NEGATIVE
EXTERNAL THC SCREEN URINE: NEGATIVE
MDMA CUT-OFF: NORMAL
METHADONE CUT-OFF: NORMAL
METHAMPHETAMINE CUT-OFF: NORMAL
OPIATES CUT-OFF: NORMAL
OXYCODONE CUT-OFF: NORMAL
THC CUT-OFF: NORMAL

## 2019-08-06 PROCEDURE — 99214 OFFICE O/P EST MOD 30 MIN: CPT | Performed by: NURSE PRACTITIONER

## 2019-08-06 PROCEDURE — 96127 BRIEF EMOTIONAL/BEHAV ASSMT: CPT | Performed by: NURSE PRACTITIONER

## 2019-08-06 RX ORDER — KETOTIFEN FUMARATE 0.35 MG/ML
SOLUTION/ DROPS OPHTHALMIC
Refills: 6 | COMMUNITY
Start: 2019-07-02 | End: 2021-02-26

## 2019-08-06 RX ORDER — FLUOXETINE 10 MG/1
10 CAPSULE ORAL DAILY
Qty: 90 CAPSULE | Refills: 0 | Status: SHIPPED | OUTPATIENT
Start: 2019-08-06 | End: 2019-10-31 | Stop reason: SDUPTHER

## 2019-08-06 NOTE — PROGRESS NOTES
Subjective   Ernesto Easley is a 69 y.o. male who is here today for medication management follow up. after seeing DR. De Leon and his care will now be transitioned over to myself.     Chief Complaint:  Follow-up for depression     History of Present Illness  Patient presents today seeing me for the first time for medication management after being transferred from Dr. De Leon's care.  Patient comes in with his wife reporting that for the last 5-6 weeks he has been taking the Prozac 20 mg every other day as was suggested by Dr. De Leon in hopes of transitioning off of the Prozac since the patients depression had been in remission.  Patient reports that it is hard to explain how he feels on the days that he does not take the Prozac but states that he does notice a significant difference.  Patient reports that he is not as upbeat or as motivated on the days that he does not take the Prozac.  Patient denies any side effects to slowly tapering or any worsening depression or any depressive symptoms but does report that he notices the difference.  Patient reports that it is hard to explain as he does not feel depressed but also does not feel as if he is himself on the days that he does not take the Prozac.  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  Patient reports that when he comes home he does get extremely tired and is able to fall asleep easily but he may wake up in the middle of the night at times to use the bathroom and then have a hard time going to sleep.  She reports that he averages roughly 6-7 hours of sleep at night.  Patient does state however that he snores at night and does need to pillows to sleep instructed the patient that if he had any worsening symptoms of difficulty with sleep and he might need to contact his PCP to rule out any sleep apnea.  His wife states that he does snore at night as she may have to  wake him up but she does not notice that he gasps for breath or has a difficult time breathing.  According to the patient's wife that she has not noticed any difference with his mood and felt that he has done fine other than saying to her that he has noticed a difference when he does not take the Prozac.  Patient reports that his appetite has been good.  Patient denies any depressive symptoms.  PHQ 9 depression questionnaire filled out total score 3, two of his scores were related to his sleep.  Patient denies any anxiety.  Patient denies any new medical concerns.  Patient states that he did have H. pylori in the past but states that is better managed now with medication.  Patient denies any SI or HI.  Patient denies any auditory visual hallucinations.    Social History     Socioeconomic History   • Marital status:      Spouse name: Not on file   • Number of children: Not on file   • Years of education: Not on file   • Highest education level: Not on file   Tobacco Use   • Smoking status: Never Smoker   • Smokeless tobacco: Never Used   Substance and Sexual Activity   • Alcohol use: No     Comment: denies   • Drug use: No     Comment: denies   • Sexual activity: No     Past Medical History:   Diagnosis Date   • Anxiety    • Depression    • Helicobacter pylori (H. pylori) infection    • Hernia of abdominal cavity    • HTN (hypertension)    • Pyloric ulcer          The following portions of the patient's history were reviewed and updated as appropriate: allergies, current medications, past family history, past medical history, past social history, past surgical history and problem list.    Review of Systems   Psychiatric/Behavioral: Positive for dysphoric mood.   All other systems reviewed and are negative.      Objective   Physical Exam   Constitutional: He appears well-developed and well-nourished.   Psychiatric: He has a normal mood and affect. His speech is normal and behavior is normal. Judgment and  "thought content normal. Cognition and memory are normal.   Nursing note and vitals reviewed.    Blood pressure 130/90, pulse 58, height 181 cm (71.26\"), weight 106 kg (234 lb 3.2 oz). Body mass index is 32.43 kg/m².      No Known Allergies    Recent Results (from the past 2016 hour(s))   [facility] Drug Screen    Collection Time: 08/06/19 10:26 AM   Result Value Ref Range    External Amphetamine Screen Urine Negative     Amphetamine Cut-Off 1000mg/ml     External Benzodiazepine Screen Urine Negative     Benzodiazipine Cut-Off 300mg/ml     External Cocaine Screen Urine Negative     Cocaine Cut-Off 300mg/mk     External THC Screen Urine Negative     THC Cut-Off 50mg/ml     External Methadone Screen Urine Negative     Methadone Cut-Off 300mg/ml     External Methamphetamine Screen Urine Negative     Methamphetamine Cut-Off 1000mg/ml     External Oxycodone Screen Urine Negative     Oxycodone Cut-Off 100mg/ml     External Buprenorphine Screen Urine Negative     Buprenorphine Cut-Off 10mg/ml     External MDMA Negative     MDMA Cut-Off 500mg/ml     External Opiates Screen Urine Negative     Opiates Cut-Off 300mg/ml        Current Medications:   Current Outpatient Medications   Medication Sig Dispense Refill   • amLODIPine (NORVASC) 10 MG tablet Take 1 tablet by mouth Daily. 30 tablet 0   • aspirin 81 MG chewable tablet Chew 81 mg Daily.     • atenolol (TENORMIN) 25 MG tablet Take 1 tablet by mouth Daily. 30 tablet 0   • FLUoxetine (PROzac) 10 MG capsule Take 1 capsule by mouth Daily. 90 capsule 0   • irbesartan-hydrochlorothiazide (AVALIDE) 150-12.5 MG tablet Daily.  5   • ketotifen (ZADITOR) 0.025 % ophthalmic solution   6   • pantoprazole (PROTONIX) 40 MG EC tablet Take 1 tablet by mouth Every Evening. 30 tablet 0   • vitamin D (ERGOCALCIFEROL) 21059 units capsule capsule   5   • polyethylene glycol (MIRALAX) powder Mix 1 capful in 8 ounces of liquid and drink Daily. 510 g 0   • sennosides-docusate sodium (SENOKOT-S) 8.6-50 " MG tablet Take 2 tablets by mouth Daily. 60 tablet 0     No current facility-administered medications for this visit.        Mental Status Exam:   Hygiene:   good  Cooperation:  Cooperative  Eye Contact:  Good  Psychomotor Behavior:  Appropriate  Affect:  Appropriate  Hopelessness: Denies  Speech:  Normal  Thought Process:  Goal directed and Linear  Thought Content:  Normal  Suicidal:  None  Homicidal:  None  Hallucinations:  None  Delusion:  None  Memory:  Intact  Orientation:  Person, Place, Time and Situation  Reliability:  good  Insight:  Good  Judgement:  Good  Impulse Control:  Good  Physical/Medical Issues:  Yes HTN and hx of H.pylori    Assessment/Plan   Diagnoses and all orders for this visit:    Major depressive disorder with single episode, in remission (CMS/[facility])  -     FLUoxetine (PROzac) 10 MG capsule; Take 1 capsule by mouth Daily.    Medication management  -     [facility] Drug Screen      PHQ 9 questionnaire filled out see flow sheet  I spent a total of  25 minutes in direct patient care, greater than  15 minutes (greater than 50%) were spent in coordination of care, and counseling the patient regarding depression in remission and tapering off prozac. Answered any questions patient had with medication and plan.     Since patient has had a difficult time in the last few weeks taking the Prozac 20 mg tablet every other day we will begin to take 10 mg of Prozac daily for 3 months and then reevaluate the need to discontinue.  Instructed patient that if he had any worsening symptoms or felt as if his depression were coming back to contact the [facility] in roughly 2 weeks and we can restart Prozac 20 mg daily.  Also encourage the patient that if roughly 1 month or so he still did not feel as if the 10 mg was helpful and still lacked motivation and being upbeat than to contact the [facility] as we can restart the Prozac 20 mg and then reevaluate tapering the dose later on.  Discussed the risks,  beneefits, and side effects of the medications; patient ackowledged and verbally consentedd.  Patient is aware to call the [facility] with any worsening of symptoms.  Patient is agreeable to call 911 or go to the nearest ER should he/she begin having SI/HI.  Highly encouraged patient that coming off medication can be different for everyone and he may need a slower taper or he may need to stay on the dose for a few months longer and then reevaluate the need to taper but highly encouraged the patient to call with any worsening symptoms.     Prognosis: Guarded dependent on medication/follow up and treatment plan compliance.  Functionality: pt doing well in important areas of daily functioning.  Patient will follow-up in 3 months as we will slowly evaluate tapering the Prozac highly encouraged patient and his wife to contact the [facility] with any worsening symptoms or if they noticed that he is symptoms have not improved as we can schedule a sooner appointment make medication adjustments patient verbally agreed and consented.    Errors in dictation may reflect use of voice recognition software and not all errors in transcription may have been detected prior to signing.

## 2019-09-06 ENCOUNTER — TELEPHONE (OUTPATIENT)
Dept: PSYCHIATRY | Facility: CLINIC | Age: 69
End: 2019-09-06

## 2019-10-31 ENCOUNTER — OFFICE VISIT (OUTPATIENT)
Dept: PSYCHIATRY | Facility: CLINIC | Age: 69
End: 2019-10-31

## 2019-10-31 VITALS
SYSTOLIC BLOOD PRESSURE: 133 MMHG | BODY MASS INDEX: 32.76 KG/M2 | WEIGHT: 234 LBS | DIASTOLIC BLOOD PRESSURE: 84 MMHG | HEART RATE: 50 BPM | HEIGHT: 71 IN

## 2019-10-31 DIAGNOSIS — F32.5 MAJOR DEPRESSIVE DISORDER WITH SINGLE EPISODE, IN REMISSION (HCC): ICD-10-CM

## 2019-10-31 PROCEDURE — 99213 OFFICE O/P EST LOW 20 MIN: CPT | Performed by: NURSE PRACTITIONER

## 2019-10-31 RX ORDER — FLUOXETINE 10 MG/1
10 CAPSULE ORAL DAILY
Qty: 90 CAPSULE | Refills: 2 | Status: SHIPPED | OUTPATIENT
Start: 2019-10-31 | End: 2020-04-23 | Stop reason: SDUPTHER

## 2019-10-31 NOTE — PROGRESS NOTES
"Subjective   Ernesto Easley is a 69 y.o. male who is here today for medication management follow up.     Chief Complaint:  Follow-up for depression     History of Present Illness   Patient comes in today noting that he has been doing good.  Patient states that he is still having motivation and energy with the 10 mg dose of Prozac.  Patient denies any depressive or anxiety symptoms.  Patient reports that he did not want to taper off as he feels good with the Prozac and is upbeat and motivated with it and does not wish to discontinue.  Patient states that things have been going well and his appetite has been good.  Patient denies any new medical changes or medicine changes.  Patient denies any side effects with the medications.  Patient reports that he has been getting roughly 6-7 hours of sleep at night and not having any more difficulty with snoring or gasping for breath during nighttime.  Patient and his wife both state that his mood has been good as things have been going well with no issues or concerns.  Patient denies any SI or HI.  Patient denies any auditory or visual hallucinations.      The following portions of the patient's history were reviewed and updated as appropriate: allergies, current medications, past family history, past medical history, past social history, past surgical history and problem list.    Review of Systems   Psychiatric/Behavioral: Negative.  Negative for dysphoric mood.       Objective   Physical Exam   Constitutional: He appears well-developed and well-nourished.   Psychiatric: He has a normal mood and affect. His speech is normal and behavior is normal. Judgment and thought content normal. Cognition and memory are normal.   Nursing note and vitals reviewed.    Blood pressure 133/84, pulse 50, height 181 cm (71.26\"), weight 106 kg (234 lb). Body mass index is 32.4 kg/m².      No Known Allergies    No results found for this or any previous visit (from the past 2016 hour(s)).    Current " Medications:   Current Outpatient Medications   Medication Sig Dispense Refill   • amLODIPine (NORVASC) 10 MG tablet Take 1 tablet by mouth Daily. 30 tablet 0   • aspirin 81 MG chewable tablet Chew 81 mg Daily.     • atenolol (TENORMIN) 25 MG tablet Take 1 tablet by mouth Daily. 30 tablet 0   • FLUoxetine (PROzac) 10 MG capsule Take 1 capsule by mouth Daily. 90 capsule 2   • irbesartan-hydrochlorothiazide (AVALIDE) 150-12.5 MG tablet Daily.  5   • ketotifen (ZADITOR) 0.025 % ophthalmic solution   6   • pantoprazole (PROTONIX) 40 MG EC tablet Take 1 tablet by mouth Every Evening. 30 tablet 0   • polyethylene glycol (MIRALAX) powder Mix 1 capful in 8 ounces of liquid and drink Daily. 510 g 0   • sennosides-docusate sodium (SENOKOT-S) 8.6-50 MG tablet Take 2 tablets by mouth Daily. 60 tablet 0   • vitamin D (ERGOCALCIFEROL) 47632 units capsule capsule   5     No current facility-administered medications for this visit.        Mental Status Exam:   Hygiene:   good  Cooperation:  Cooperative  Eye Contact:  Good  Psychomotor Behavior:  Appropriate  Affect:  Appropriate  Hopelessness: Denies  Speech:  Normal  Thought Process:  Goal directed and Linear  Thought Content:  Normal  Suicidal:  None  Homicidal:  None  Hallucinations:  None  Delusion:  None  Memory:  Intact  Orientation:  Person, Place, Time and Situation  Reliability:  good  Insight:  Good  Judgement:  Good  Impulse Control:  Good  Physical/Medical Issues:  Yes HTN and hx of H.pylori    Assessment/Plan   Diagnoses and all orders for this visit:    Major depressive disorder with single episode, in remission (CMS/[facility])  -     FLUoxetine (PROzac) 10 MG capsule; Take 1 capsule by mouth Daily.      Disussed medication options as well as any current side effects as well as the patient's desire to stay on the Prozac at a low dose as he feels it is helpful for him.    Continue 10 mg fluoxetine daily for depressive symptoms in remission as patient feels that he has been  doing well and wants to continue at the current dose.  Discussed the risks, beneefits, and side effects of the medications; patient ackowledged and verbally consentedd.  Patient is aware to call the [facility] with any worsening of symptoms.  Patient is agreeable to call 911 or go to the nearest ER should he/she begin having SI/HI.    Prognosis: Guarded dependent on medication/follow up and treatment plan compliance.  Functionality: pt showing improvements in important areas of daily functioning and denies any depressive or anxiety symptoms and will do so for the next 6 months.  Patient will follow-up in 6 months as he currently feels stable on the medication regimen and does not want to change at this time.  Encourage patient that if he did have any worsening symptoms that can contact the [facility] for sooner appointment both agreed.    Errors in dictation may reflect use of voice recognition software and not all errors in transcription may have been detected prior to signing.

## 2019-11-01 NOTE — PLAN OF CARE
----- Message from Lizzie Timmons MD sent at 11/1/2019  8:33 AM CDT -----  CMP, CBC, TSH, urinalysis are all normal.  Cholesterol is 229 with LDL of 138--goal LDL is less than 130.   Therefore patient needs to be more strict with weight loss and low-shell Problem: Patient Care Overview  Goal: Plan of Care Review  Outcome: Ongoing (interventions implemented as appropriate)   06/14/18 3110   Coping/Psychosocial   Plan of Care Reviewed With patient   Coping/Psychosocial   Patient Agreement with Plan of Care agrees   Plan of Care Review   Progress improving   OTHER   Outcome Summary PT rated anxiety a 10 and depression an 8. Pt was not suicidal or homicidal and not having any hallucinations. Pt stated he was eating and sleeping well and having no other issues this shift.       Problem: Overarching Goals (Adult)  Goal: Adheres to Safety Considerations for Self and Others  Outcome: Ongoing (interventions implemented as appropriate)    Goal: Optimized Coping Skills in Response to Life Stressors  Outcome: Ongoing (interventions implemented as appropriate)    Goal: Develops/Participates in The[facility] [place] to Support Successful Transition  Outcome: Ongoing (interventions implemented as appropriate)

## 2020-04-23 ENCOUNTER — TELEMEDICINE (OUTPATIENT)
Dept: PSYCHIATRY | Facility: CLINIC | Age: 70
End: 2020-04-23

## 2020-04-23 DIAGNOSIS — F32.5 MAJOR DEPRESSIVE DISORDER WITH SINGLE EPISODE, IN REMISSION (HCC): Primary | ICD-10-CM

## 2020-04-23 PROCEDURE — 99441 PR PHYS/QHP TELEPHONE EVALUATION 5-10 MIN: CPT | Performed by: NURSE PRACTITIONER

## 2020-04-23 RX ORDER — FLUOXETINE 10 MG/1
10 CAPSULE ORAL DAILY
Qty: 90 CAPSULE | Refills: 2 | Status: SHIPPED | OUTPATIENT
Start: 2020-04-23 | End: 2020-10-08 | Stop reason: SDUPTHER

## 2020-04-23 NOTE — PROGRESS NOTES
You have chosen to receive care through a telephone visit. Do you consent to use a telephone visit for your medical care today? YES    This provider is located at [facility], Behavioral Health at [address] [place] [address]. The provider identified herself as well as her credentials.   The Patient is at home using his phone because problems with video connection. The patient's condition being diagnosed/treated is appropriate for telemedicine. The patient gave consent to be seen remotely, and when consent is given they understand that the consent allows for patient identifiable information to be sent to a third party as needed.   They may refuse to be seen remotely at any time. The electronic data is encrypted and password protected, and the patient has been advised of the potential risks to privacy not withstanding such measures    Subjective   Ernesto Easley is a 69 y.o. male is being interviewed via Telephone as recommended per CDC guidelines associated with Corona Pandemic.  Patient was transferred from Lorrie Kennedy who was treating the patient for depression.    Chief Complaint: Depression    History of Present Illness  He states that he has been taking the prozac as prescribed with no SE or problems.  He states that his PCP prescribed him a 30 day supply but would like a 90 day.  He rates his depression about 0/10 with 10 being the worse.  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  He states that he is getting about 8 hours per night of sleep; denies any NM.  Appetite has been good; gained a little weight since last visit. He denies any recent health issues, denies any acute stressors.   He denies any AV hallucinations, denies any SI/HI.      The following portions of the patient's history were reviewed and updated as appropriate: allergies, current medications, past family history, past medical history, past social history, past surgical  history and problem list.    Review of Systems   Constitutional: Negative for appetite change, chills, diaphoresis, fatigue, fever and unexpected weight change.   HENT: Negative for hearing loss, sore throat, trouble swallowing and voice change.    Eyes: Negative for photophobia and visual disturbance.   Respiratory: Negative for cough, chest tightness and shortness of breath.    Cardiovascular: Negative for chest pain and palpitations.   Gastrointestinal: Negative for abdominal pain, constipation, nausea and vomiting.   Endocrine: Negative for cold intolerance and heat intolerance.   Genitourinary: Negative for dysuria and frequency.   Musculoskeletal: Negative for arthralgias, back pain, joint swelling and neck stiffness.   Skin: Negative for color change and wound.   Allergic/Immunologic: Negative for environmental allergies and immunocompromised state.   Neurological: Negative for dizziness, tremors, seizures, syncope, weakness, light-headedness and headaches.   Hematological: Negative for adenopathy. Does not bruise/bleed easily.       Objective  Unable to assess  Physical Exam   Constitutional: He appears well-developed and well-nourished. No distress.   Neurological: He is alert. Coordination and gait normal.   Vitals reviewed.    There were no vitals taken for this visit.    Medication List:   Current Outpatient Medications   Medication Sig Dispense Refill   • amLODIPine (NORVASC) 10 MG tablet Take 1 tablet by mouth Daily. 30 tablet 0   • aspirin 81 MG chewable tablet Chew 81 mg Daily.     • atenolol (TENORMIN) 25 MG tablet Take 1 tablet by mouth Daily. 30 tablet 0   • FLUoxetine (PROzac) 10 MG capsule Take 1 capsule by mouth Daily. 90 capsule 2   • irbesartan-hydrochlorothiazide (AVALIDE) 150-12.5 MG tablet Daily.  5   • ketotifen (ZADITOR) 0.025 % ophthalmic solution   6   • pantoprazole (PROTONIX) 40 MG EC tablet Take 1 tablet by mouth Every Evening. 30 tablet 0   • polyethylene glycol (MIRALAX) powder  Mix 1 capful in 8 ounces of liquid and drink Daily. 510 g 0   • sennosides-docusate sodium (SENOKOT-S) 8.6-50 MG tablet Take 2 tablets by mouth Daily. 60 tablet 0   • vitamin D (ERGOCALCIFEROL) 85809 units capsule capsule   5     No current facility-administered medications for this visit.        Mental Status Exam:   Hygiene:   unable to assess  Cooperation:  Cooperative  Eye Contact:  unable to assess  Psychomotor Behavior:  unable to assess  Affect:  unable to assess  Hopelessness: Denies  Speech:  Normal  Thought Process:  Linear  Thought Content:  Mood congruent  Suicidal:  None  Homicidal:  None  Hallucinations:  None  Delusion:  None  Memory:  Intact  Orientation:  Person, Place, Time and Situation  Reliability:  fair  Insight:  Fair  Judgement:  Fair  Impulse Control:  Fair  Physical/Medical Issues:  No     Assessment/Plan   Problems Addressed this Visit     None      Visit Diagnoses     Major depressive disorder with single episode, in remission (CMS/HCC)    -  Primary    Relevant Medications    FLUoxetine (PROzac) 10 MG capsule          Discussed medication options. Continue prozac for depression.   Reviewed the risks, benefits, and side effects of the medications; patient acknowledged and verbally consented.  Patient is agreeable to call the [facility] with any worsening of symptoms.  Patient is aware to call 911 or go to the nearest ER should begin having SI/HI.     Prognosis: Guarded dependent on medication, follow up appointment and treatment plan compliance     Functionality: Good.  Symptoms are under control and he is stable on medications.    Return in 24 weeks    This visit has been rescheduled as a phone visit to comply with patient safety concerns in accordance with CDC recommendations. Total time of discussion was 10 minutes.

## 2020-10-05 ENCOUNTER — TELEPHONE (OUTPATIENT)
Dept: PSYCHIATRY | Facility: CLINIC | Age: 70
End: 2020-10-05

## 2020-10-05 NOTE — TELEPHONE ENCOUNTER
Patient wants to do a phone visit for his appt on Thursday.  They don't do the video, but he is concerned about coming out due to the covid.

## 2020-10-08 ENCOUNTER — TELEMEDICINE (OUTPATIENT)
Dept: PSYCHIATRY | Facility: CLINIC | Age: 70
End: 2020-10-08

## 2020-10-08 DIAGNOSIS — F32.5 MAJOR DEPRESSIVE DISORDER WITH SINGLE EPISODE, IN REMISSION (HCC): Primary | ICD-10-CM

## 2020-10-08 PROCEDURE — 99213 OFFICE O/P EST LOW 20 MIN: CPT | Performed by: NURSE PRACTITIONER

## 2020-10-08 RX ORDER — FLUOXETINE 10 MG/1
10 CAPSULE ORAL DAILY
Qty: 90 CAPSULE | Refills: 2 | Status: SHIPPED | OUTPATIENT
Start: 2020-10-08 | End: 2021-02-26

## 2020-10-08 NOTE — PROGRESS NOTES
"You have chosen to receive care through a telephone visit. Do you consent to use a telephone visit for your medical care today? YES    This provider is located at [facility], Behavioral Health at [address] [place] [address]. The provider identified herself as well as her credentials.   The Patient is at home using his phone because problems with video connection. The patient's condition being diagnosed/treated is appropriate for telemedicine. The patient gave consent to be seen remotely, and when consent is given they understand that the consent allows for patient identifiable information to be sent to a third party as needed.   They may refuse to be seen remotely at any time. The electronic data is encrypted and password protected, and the patient has been advised of the potential risks to privacy not withstanding such measures    Subjective   Ernesto Easley is a 70 y.o. male is being interviewed via Telephone as recommended per CDC guidelines associated with Corona Pandemic.     Chief Complaint: Depression    History of Present Illness   He states that he is doing just fine, he states that he has been taking his medications as prescribed with no SE or problems.  He denies any symptoms of depression, rates his symptoms 0/10 with 10 being the worse.  He denies any symptoms of anxiety.  He states that he is sleeping about 7-8 hours per night with no NM.  Appetite has been good, states that he may have gained a couple of pounds- \"just like to eat, I guess\".  He states that his wife is suppose to have a cardiac catheter at the end of the month and he is worried about that.  He denies any new health issues.  Denies any AV hallucinations, denies any SI/HI.      The following portions of the patient's history were reviewed and updated as appropriate: allergies, current medications, past family history, past medical history, past social history, past surgical history and problem list.    Review of " Systems   Constitutional: Negative for appetite change, chills, diaphoresis, fatigue, fever and unexpected weight change.   HENT: Negative for hearing loss, sore throat, trouble swallowing and voice change.    Eyes: Negative for photophobia and visual disturbance.   Respiratory: Negative for cough, chest tightness and shortness of breath.    Cardiovascular: Negative for chest pain and palpitations.   Gastrointestinal: Negative for abdominal pain, constipation, nausea and vomiting.   Endocrine: Negative for cold intolerance and heat intolerance.   Genitourinary: Negative for dysuria and frequency.   Musculoskeletal: Negative for arthralgias, back pain, joint swelling and neck stiffness.   Skin: Negative for color change and wound.   Allergic/Immunologic: Negative for environmental allergies and immunocompromised state.   Neurological: Negative for dizziness, tremors, seizures, syncope, weakness, light-headedness and headaches.   Hematological: Negative for adenopathy. Does not bruise/bleed easily.       Objective  Unable to assess  Physical Exam   Constitutional: He appears well-developed. No distress.   Neurological: He is alert. Coordination and gait normal.   Vitals reviewed.    There were no vitals taken for this visit.    Medication List:   Current Outpatient Medications   Medication Sig Dispense Refill   • amLODIPine (NORVASC) 10 MG tablet Take 1 tablet by mouth Daily. 30 tablet 0   • aspirin 81 MG chewable tablet Chew 81 mg Daily.     • atenolol (TENORMIN) 25 MG tablet Take 1 tablet by mouth Daily. 30 tablet 0   • FLUoxetine (PROzac) 10 MG capsule Take 1 capsule by mouth Daily. 90 capsule 2   • irbesartan-hydrochlorothiazide (AVALIDE) 150-12.5 MG tablet Daily.  5   • ketotifen (ZADITOR) 0.025 % ophthalmic solution   6   • pantoprazole (PROTONIX) 40 MG EC tablet Take 1 tablet by mouth Every Evening. 30 tablet 0   • polyethylene glycol (MIRALAX) powder Mix 1 capful in 8 ounces of liquid and drink Daily. 510 g 0    • sennosides-docusate sodium (SENOKOT-S) 8.6-50 MG tablet Take 2 tablets by mouth Daily. 60 tablet 0   • vitamin D (ERGOCALCIFEROL) 70707 units capsule capsule   5     No current facility-administered medications for this visit.        Mental Status Exam:   Hygiene:   unable to assess  Cooperation:  Cooperative  Eye Contact:  unable to assess  Psychomotor Behavior:  unable to assess  Affect:  unable to assess  Hopelessness: Denies  Speech:  Normal  Thought Process:  Linear  Thought Content:  Mood congruent  Suicidal:  None  Homicidal:  None  Hallucinations:  None  Delusion:  None  Memory:  Intact  Orientation:  Person, Place, Time and Situation  Reliability:  fair  Insight:  Fair  Judgement:  Fair  Impulse Control:  Fair  Physical/Medical Issues:  No     Assessment/Plan   Problems Addressed this Visit     None      Visit Diagnoses     Major depressive disorder with single episode, in remission (CMS/HCC)    -  Primary    Relevant Medications    FLUoxetine (PROzac) 10 MG capsule      Diagnoses       Codes Comments    Major depressive disorder with single episode, in remission (CMS/HCC)    -  Primary ICD-10-CM: F32.5  ICD-9-CM: 296.25           Discussed medication options. Continue prozac for depression.   Reviewed the risks, benefits, and side effects of the medications; patient acknowledged and verbally consented.  Patient is agreeable to call the [facility] with any worsening of symptoms.  Patient is aware to call 911 or go to the nearest ER should begin having SI/HI.     Prognosis: Guarded dependent on medication, follow up appointment and treatment plan compliance     Functionality: Good.  Symptoms are under control and he is stable on medications.    Return in 24 weeks    This visit has been rescheduled as a phone visit to comply with patient safety concerns in accordance with CDC recommendations. Total time of discussion was 10 minutes.

## 2021-02-26 ENCOUNTER — HOSPITAL ENCOUNTER (EMERGENCY)
Facility: HOSPITAL | Age: 71
Discharge: PSYCHIATRIC OR SUBSTANCE USE FACILITY | End: 2021-02-26
Attending: EMERGENCY MEDICINE | Admitting: EMERGENCY MEDICINE

## 2021-02-26 ENCOUNTER — HOSPITAL ENCOUNTER (INPATIENT)
Facility: HOSPITAL | Age: 71
LOS: 21 days | Discharge: HOME | End: 2021-03-19
Attending: PSYCHIATRY & NEUROLOGY | Admitting: PSYCHIATRY & NEUROLOGY

## 2021-02-26 VITALS
SYSTOLIC BLOOD PRESSURE: 134 MMHG | RESPIRATION RATE: 18 BRPM | HEIGHT: 68 IN | HEART RATE: 72 BPM | BODY MASS INDEX: 31.37 KG/M2 | WEIGHT: 207 LBS | OXYGEN SATURATION: 97 % | TEMPERATURE: 97.2 F | DIASTOLIC BLOOD PRESSURE: 90 MMHG

## 2021-02-26 DIAGNOSIS — F33.2 SEVERE EPISODE OF RECURRENT MAJOR DEPRESSIVE DISORDER, WITHOUT PSYCHOTIC FEATURES (HCC): Primary | ICD-10-CM

## 2021-02-26 DIAGNOSIS — F32.2 CURRENT SEVERE EPISODE OF MAJOR DEPRESSIVE DISORDER WITHOUT PSYCHOTIC FEATURES WITHOUT PRIOR EPISODE (HCC): Primary | ICD-10-CM

## 2021-02-26 PROBLEM — F32.9 MDD (MAJOR DEPRESSIVE DISORDER): Status: ACTIVE | Noted: 2021-02-26

## 2021-02-26 LAB
6-ACETYL MORPHINE: NEGATIVE
ALBUMIN SERPL-MCNC: 4.06 G/DL (ref 3.5–5.2)
ALBUMIN/GLOB SERPL: 1.3 G/DL
ALP SERPL-CCNC: 94 U/L (ref 39–117)
ALT SERPL W P-5'-P-CCNC: 19 U/L (ref 1–41)
AMPHET+METHAMPHET UR QL: NEGATIVE
ANION GAP SERPL CALCULATED.3IONS-SCNC: 8.5 MMOL/L (ref 5–15)
AST SERPL-CCNC: 16 U/L (ref 1–40)
BARBITURATES UR QL SCN: NEGATIVE
BASOPHILS # BLD AUTO: 0.1 10*3/MM3 (ref 0–0.2)
BASOPHILS NFR BLD AUTO: 1.1 % (ref 0–1.5)
BENZODIAZ UR QL SCN: POSITIVE
BILIRUB SERPL-MCNC: 0.5 MG/DL (ref 0–1.2)
BILIRUB UR QL STRIP: NEGATIVE
BUN SERPL-MCNC: 15 MG/DL (ref 8–23)
BUN/CREAT SERPL: 14.2 (ref 7–25)
BUPRENORPHINE SERPL-MCNC: NEGATIVE NG/ML
CALCIUM SPEC-SCNC: 9.6 MG/DL (ref 8.6–10.5)
CANNABINOIDS SERPL QL: NEGATIVE
CHLORIDE SERPL-SCNC: 100 MMOL/L (ref 98–107)
CLARITY UR: CLEAR
CO2 SERPL-SCNC: 26.5 MMOL/L (ref 22–29)
COCAINE UR QL: NEGATIVE
COLOR UR: YELLOW
CREAT SERPL-MCNC: 1.06 MG/DL (ref 0.76–1.27)
DEPRECATED RDW RBC AUTO: 48.7 FL (ref 37–54)
EOSINOPHIL # BLD AUTO: 0.22 10*3/MM3 (ref 0–0.4)
EOSINOPHIL NFR BLD AUTO: 2.4 % (ref 0.3–6.2)
ERYTHROCYTE [DISTWIDTH] IN BLOOD BY AUTOMATED COUNT: 14.1 % (ref 12.3–15.4)
ETHANOL BLD-MCNC: <10 MG/DL (ref 0–10)
ETHANOL UR QL: <0.01 %
FLUAV RNA RESP QL NAA+PROBE: NOT DETECTED
FLUBV RNA RESP QL NAA+PROBE: NOT DETECTED
GFR SERPL CREATININE-BSD FRML MDRD: 69 ML/MIN/1.73
GLOBULIN UR ELPH-MCNC: 3 GM/DL
GLUCOSE SERPL-MCNC: 108 MG/DL (ref 65–99)
GLUCOSE UR STRIP-MCNC: NEGATIVE MG/DL
HCT VFR BLD AUTO: 42.1 % (ref 37.5–51)
HGB BLD-MCNC: 14.4 G/DL (ref 13–17.7)
HGB UR QL STRIP.AUTO: NEGATIVE
HOLD SPECIMEN: NORMAL
HOLD SPECIMEN: NORMAL
IMM GRANULOCYTES # BLD AUTO: 0.03 10*3/MM3 (ref 0–0.05)
IMM GRANULOCYTES NFR BLD AUTO: 0.3 % (ref 0–0.5)
KETONES UR QL STRIP: NEGATIVE
LEUKOCYTE ESTERASE UR QL STRIP.AUTO: NEGATIVE
LYMPHOCYTES # BLD AUTO: 2.21 10*3/MM3 (ref 0.7–3.1)
LYMPHOCYTES NFR BLD AUTO: 23.9 % (ref 19.6–45.3)
MAGNESIUM SERPL-MCNC: 1.8 MG/DL (ref 1.6–2.4)
MCH RBC QN AUTO: 32.1 PG (ref 26.6–33)
MCHC RBC AUTO-ENTMCNC: 34.2 G/DL (ref 31.5–35.7)
MCV RBC AUTO: 93.8 FL (ref 79–97)
METHADONE UR QL SCN: NEGATIVE
MONOCYTES # BLD AUTO: 0.93 10*3/MM3 (ref 0.1–0.9)
MONOCYTES NFR BLD AUTO: 10.1 % (ref 5–12)
NEUTROPHILS NFR BLD AUTO: 5.74 10*3/MM3 (ref 1.7–7)
NEUTROPHILS NFR BLD AUTO: 62.2 % (ref 42.7–76)
NITRITE UR QL STRIP: NEGATIVE
NRBC BLD AUTO-RTO: 0 /100 WBC (ref 0–0.2)
OPIATES UR QL: NEGATIVE
OXYCODONE UR QL SCN: NEGATIVE
PCP UR QL SCN: NEGATIVE
PH UR STRIP.AUTO: 5.5 [PH] (ref 5–8)
PLATELET # BLD AUTO: 295 10*3/MM3 (ref 140–450)
PMV BLD AUTO: 9.8 FL (ref 6–12)
POTASSIUM SERPL-SCNC: 3.9 MMOL/L (ref 3.5–5.2)
PROT SERPL-MCNC: 7.1 G/DL (ref 6–8.5)
PROT UR QL STRIP: NEGATIVE
RBC # BLD AUTO: 4.49 10*6/MM3 (ref 4.14–5.8)
SARS-COV-2 RNA RESP QL NAA+PROBE: NOT DETECTED
SODIUM SERPL-SCNC: 135 MMOL/L (ref 136–145)
SP GR UR STRIP: 1.01 (ref 1–1.03)
UROBILINOGEN UR QL STRIP: NORMAL
WBC # BLD AUTO: 9.23 10*3/MM3 (ref 3.4–10.8)
WHOLE BLOOD HOLD SPECIMEN: NORMAL
WHOLE BLOOD HOLD SPECIMEN: NORMAL

## 2021-02-26 PROCEDURE — 87636 SARSCOV2 & INF A&B AMP PRB: CPT | Performed by: PHYSICIAN ASSISTANT

## 2021-02-26 PROCEDURE — 93005 ELECTROCARDIOGRAM TRACING: CPT | Performed by: PHYSICIAN ASSISTANT

## 2021-02-26 PROCEDURE — 80307 DRUG TEST PRSMV CHEM ANLYZR: CPT | Performed by: PHYSICIAN ASSISTANT

## 2021-02-26 PROCEDURE — C9803 HOPD COVID-19 SPEC COLLECT: HCPCS

## 2021-02-26 PROCEDURE — 99285 EMERGENCY DEPT VISIT HI MDM: CPT

## 2021-02-26 PROCEDURE — 80053 COMPREHEN METABOLIC PANEL: CPT | Performed by: PHYSICIAN ASSISTANT

## 2021-02-26 PROCEDURE — 83735 ASSAY OF MAGNESIUM: CPT | Performed by: PHYSICIAN ASSISTANT

## 2021-02-26 PROCEDURE — 85025 COMPLETE CBC W/AUTO DIFF WBC: CPT | Performed by: PHYSICIAN ASSISTANT

## 2021-02-26 PROCEDURE — 81003 URINALYSIS AUTO W/O SCOPE: CPT | Performed by: PHYSICIAN ASSISTANT

## 2021-02-26 PROCEDURE — 82077 ASSAY SPEC XCP UR&BREATH IA: CPT | Performed by: PHYSICIAN ASSISTANT

## 2021-02-26 PROCEDURE — 93010 ELECTROCARDIOGRAM REPORT: CPT | Performed by: INTERNAL MEDICINE

## 2021-02-26 RX ORDER — PANTOPRAZOLE SODIUM 40 MG/1
40 TABLET, DELAYED RELEASE ORAL EVERY EVENING
Status: DISCONTINUED | OUTPATIENT
Start: 2021-02-26 | End: 2021-03-19 | Stop reason: HOSPADM

## 2021-02-26 RX ORDER — CARVEDILOL 25 MG/1
25 TABLET ORAL 2 TIMES DAILY WITH MEALS
COMMUNITY
End: 2021-05-27

## 2021-02-26 RX ORDER — ALPRAZOLAM 0.5 MG/1
0.5 TABLET ORAL 2 TIMES DAILY PRN
COMMUNITY
End: 2021-03-19 | Stop reason: HOSPADM

## 2021-02-26 RX ORDER — CARVEDILOL 25 MG/1
25 TABLET ORAL 2 TIMES DAILY WITH MEALS
Status: DISCONTINUED | OUTPATIENT
Start: 2021-02-26 | End: 2021-03-19 | Stop reason: HOSPADM

## 2021-02-26 RX ORDER — AMLODIPINE BESYLATE 10 MG/1
10 TABLET ORAL
Status: DISCONTINUED | OUTPATIENT
Start: 2021-02-27 | End: 2021-03-19 | Stop reason: HOSPADM

## 2021-02-26 RX ORDER — ASPIRIN 81 MG/1
81 TABLET ORAL DAILY
Status: DISCONTINUED | OUTPATIENT
Start: 2021-02-27 | End: 2021-03-19 | Stop reason: HOSPADM

## 2021-02-26 RX ORDER — FLUOXETINE HYDROCHLORIDE 20 MG/1
20 CAPSULE ORAL EVERY MORNING
Status: DISCONTINUED | OUTPATIENT
Start: 2021-02-27 | End: 2021-03-01

## 2021-02-26 RX ORDER — ALPRAZOLAM 0.5 MG/1
0.5 TABLET ORAL 2 TIMES DAILY PRN
Status: DISCONTINUED | OUTPATIENT
Start: 2021-02-26 | End: 2021-03-19 | Stop reason: HOSPADM

## 2021-02-26 RX ORDER — ALBUTEROL SULFATE 90 UG/1
2 AEROSOL, METERED RESPIRATORY (INHALATION) 3 TIMES DAILY
Status: DISCONTINUED | OUTPATIENT
Start: 2021-02-26 | End: 2021-03-19 | Stop reason: HOSPADM

## 2021-02-26 RX ORDER — FLUOXETINE HYDROCHLORIDE 20 MG/1
20 CAPSULE ORAL EVERY MORNING
COMMUNITY
End: 2021-03-19 | Stop reason: HOSPADM

## 2021-02-26 RX ORDER — ALBUTEROL SULFATE 90 UG/1
2 AEROSOL, METERED RESPIRATORY (INHALATION) 3 TIMES DAILY
COMMUNITY

## 2021-02-26 RX ADMIN — CARVEDILOL 25 MG: 25 TABLET, FILM COATED ORAL at 21:18

## 2021-02-26 RX ADMIN — PANTOPRAZOLE SODIUM 40 MG: 40 TABLET, DELAYED RELEASE ORAL at 21:18

## 2021-02-26 RX ADMIN — ALBUTEROL SULFATE 2 PUFF: 90 AEROSOL, METERED RESPIRATORY (INHALATION) at 21:18

## 2021-02-27 LAB
BH CV ECHO MEAS - ACS: 2 CM
BH CV ECHO MEAS - AO MAX PG: 4.2 MMHG
BH CV ECHO MEAS - AO MEAN PG: 3 MMHG
BH CV ECHO MEAS - AO ROOT AREA (BSA CORRECTED): 1.4
BH CV ECHO MEAS - AO ROOT AREA: 6.4 CM^2
BH CV ECHO MEAS - AO ROOT DIAM: 2.9 CM
BH CV ECHO MEAS - AO V2 MAX: 103 CM/SEC
BH CV ECHO MEAS - AO V2 MEAN: 83.8 CM/SEC
BH CV ECHO MEAS - AO V2 VTI: 18 CM
BH CV ECHO MEAS - BSA(HAYCOCK): 2.1 M^2
BH CV ECHO MEAS - BSA: 2.1 M^2
BH CV ECHO MEAS - BZI_BMI: 31 KILOGRAMS/M^2
BH CV ECHO MEAS - BZI_METRIC_HEIGHT: 172.7 CM
BH CV ECHO MEAS - BZI_METRIC_WEIGHT: 92.5 KG
BH CV ECHO MEAS - EDV(CUBED): 83.5 ML
BH CV ECHO MEAS - EDV(MOD-SP4): 66.8 ML
BH CV ECHO MEAS - EDV(TEICH): 86.3 ML
BH CV ECHO MEAS - EF(CUBED): 89.5 %
BH CV ECHO MEAS - EF(MOD-SP4): 67.2 %
BH CV ECHO MEAS - EF(TEICH): 84.1 %
BH CV ECHO MEAS - ESV(CUBED): 8.7 ML
BH CV ECHO MEAS - ESV(MOD-SP4): 21.9 ML
BH CV ECHO MEAS - ESV(TEICH): 13.7 ML
BH CV ECHO MEAS - FS: 52.9 %
BH CV ECHO MEAS - IVS/LVPW: 0.77
BH CV ECHO MEAS - IVSD: 0.64 CM
BH CV ECHO MEAS - LA DIMENSION: 3.8 CM
BH CV ECHO MEAS - LA/AO: 1.3
BH CV ECHO MEAS - LV DIASTOLIC VOL/BSA (35-75): 32.4 ML/M^2
BH CV ECHO MEAS - LV MASS(C)D: 97.9 GRAMS
BH CV ECHO MEAS - LV MASS(C)DI: 47.5 GRAMS/M^2
BH CV ECHO MEAS - LV SYSTOLIC VOL/BSA (12-30): 10.6 ML/M^2
BH CV ECHO MEAS - LVIDD: 4.4 CM
BH CV ECHO MEAS - LVIDS: 2.1 CM
BH CV ECHO MEAS - LVLD AP4: 7.1 CM
BH CV ECHO MEAS - LVLS AP4: 6.4 CM
BH CV ECHO MEAS - LVOT AREA (M): 3.8 CM^2
BH CV ECHO MEAS - LVOT AREA: 3.8 CM^2
BH CV ECHO MEAS - LVOT DIAM: 2.2 CM
BH CV ECHO MEAS - LVPWD: 0.84 CM
BH CV ECHO MEAS - MV A MAX VEL: 77.1 CM/SEC
BH CV ECHO MEAS - MV E MAX VEL: 101 CM/SEC
BH CV ECHO MEAS - MV E/A: 1.3
BH CV ECHO MEAS - PA ACC TIME: 0.11 SEC
BH CV ECHO MEAS - PA PR(ACCEL): 31.3 MMHG
BH CV ECHO MEAS - RAP SYSTOLE: 10 MMHG
BH CV ECHO MEAS - RVSP: 28.1 MMHG
BH CV ECHO MEAS - SI(AO): 55.7 ML/M^2
BH CV ECHO MEAS - SI(CUBED): 36.2 ML/M^2
BH CV ECHO MEAS - SI(MOD-SP4): 21.8 ML/M^2
BH CV ECHO MEAS - SI(TEICH): 35.2 ML/M^2
BH CV ECHO MEAS - SV(AO): 114.8 ML
BH CV ECHO MEAS - SV(CUBED): 74.7 ML
BH CV ECHO MEAS - SV(MOD-SP4): 44.9 ML
BH CV ECHO MEAS - SV(TEICH): 72.6 ML
BH CV ECHO MEAS - TR MAX VEL: 213 CM/SEC
CHOLEST SERPL-MCNC: 162 MG/DL (ref 0–200)
HDLC SERPL-MCNC: 28 MG/DL (ref 40–60)
LDLC SERPL CALC-MCNC: 80 MG/DL (ref 0–100)
LDLC/HDLC SERPL: 2.44 {RATIO}
MAXIMAL PREDICTED HEART RATE: 152 BPM
QT INTERVAL: 354 MS
QT INTERVAL: 432 MS
QTC INTERVAL: 440 MS
QTC INTERVAL: 522 MS
STRESS TARGET HR: 129 BPM
TRIGL SERPL-MCNC: 328 MG/DL (ref 0–150)
VLDLC SERPL-MCNC: 54 MG/DL (ref 5–40)

## 2021-02-27 PROCEDURE — 99223 1ST HOSP IP/OBS HIGH 75: CPT | Performed by: PSYCHIATRY & NEUROLOGY

## 2021-02-27 PROCEDURE — 99222 1ST HOSP IP/OBS MODERATE 55: CPT | Performed by: INTERNAL MEDICINE

## 2021-02-27 PROCEDURE — 93010 ELECTROCARDIOGRAM REPORT: CPT | Performed by: INTERNAL MEDICINE

## 2021-02-27 PROCEDURE — 80061 LIPID PANEL: CPT | Performed by: PSYCHIATRY & NEUROLOGY

## 2021-02-27 PROCEDURE — 93005 ELECTROCARDIOGRAM TRACING: CPT | Performed by: PSYCHIATRY & NEUROLOGY

## 2021-02-27 RX ORDER — BISACODYL 5 MG/1
5 TABLET, DELAYED RELEASE ORAL DAILY PRN
Status: DISCONTINUED | OUTPATIENT
Start: 2021-02-27 | End: 2021-03-19 | Stop reason: HOSPADM

## 2021-02-27 RX ORDER — BENZONATATE 100 MG/1
100 CAPSULE ORAL 3 TIMES DAILY PRN
Status: DISCONTINUED | OUTPATIENT
Start: 2021-02-27 | End: 2021-03-19 | Stop reason: HOSPADM

## 2021-02-27 RX ORDER — LOPERAMIDE HYDROCHLORIDE 2 MG/1
2 CAPSULE ORAL
Status: DISCONTINUED | OUTPATIENT
Start: 2021-02-27 | End: 2021-03-19 | Stop reason: HOSPADM

## 2021-02-27 RX ORDER — ECHINACEA PURPUREA EXTRACT 125 MG
2 TABLET ORAL AS NEEDED
Status: DISCONTINUED | OUTPATIENT
Start: 2021-02-27 | End: 2021-03-19 | Stop reason: HOSPADM

## 2021-02-27 RX ORDER — ACETAMINOPHEN 325 MG/1
650 TABLET ORAL EVERY 6 HOURS PRN
Status: DISCONTINUED | OUTPATIENT
Start: 2021-02-27 | End: 2021-03-19 | Stop reason: HOSPADM

## 2021-02-27 RX ORDER — ALUMINA, MAGNESIA, AND SIMETHICONE 2400; 2400; 240 MG/30ML; MG/30ML; MG/30ML
15 SUSPENSION ORAL EVERY 6 HOURS PRN
Status: DISCONTINUED | OUTPATIENT
Start: 2021-02-27 | End: 2021-03-19 | Stop reason: HOSPADM

## 2021-02-27 RX ORDER — ONDANSETRON 4 MG/1
4 TABLET, FILM COATED ORAL EVERY 6 HOURS PRN
Status: DISCONTINUED | OUTPATIENT
Start: 2021-02-27 | End: 2021-03-19 | Stop reason: HOSPADM

## 2021-02-27 RX ORDER — LANOLIN ALCOHOL/MO/W.PET/CERES
3 CREAM (GRAM) TOPICAL NIGHTLY PRN
Status: DISCONTINUED | OUTPATIENT
Start: 2021-03-01 | End: 2021-03-19 | Stop reason: HOSPADM

## 2021-02-27 RX ORDER — TRAZODONE HYDROCHLORIDE 50 MG/1
12.5 TABLET ORAL NIGHTLY PRN
Status: DISCONTINUED | OUTPATIENT
Start: 2021-02-27 | End: 2021-02-27

## 2021-02-27 RX ORDER — IBUPROFEN 400 MG/1
400 TABLET ORAL EVERY 6 HOURS PRN
Status: DISCONTINUED | OUTPATIENT
Start: 2021-02-27 | End: 2021-03-19 | Stop reason: HOSPADM

## 2021-02-27 RX ADMIN — ASPIRIN 81 MG: 81 TABLET, COATED ORAL at 08:04

## 2021-02-27 RX ADMIN — ALBUTEROL SULFATE 2 PUFF: 90 AEROSOL, METERED RESPIRATORY (INHALATION) at 22:07

## 2021-02-27 RX ADMIN — AMLODIPINE BESYLATE 10 MG: 10 TABLET ORAL at 08:04

## 2021-02-27 RX ADMIN — CARVEDILOL 25 MG: 25 TABLET, FILM COATED ORAL at 17:31

## 2021-02-27 RX ADMIN — HYDROCHLOROTHIAZIDE: 12.5 TABLET ORAL at 08:04

## 2021-02-27 RX ADMIN — FLUOXETINE HYDROCHLORIDE 20 MG: 20 CAPSULE ORAL at 06:19

## 2021-02-27 RX ADMIN — PANTOPRAZOLE SODIUM 40 MG: 40 TABLET, DELAYED RELEASE ORAL at 16:41

## 2021-02-27 RX ADMIN — ALBUTEROL SULFATE 2 PUFF: 90 AEROSOL, METERED RESPIRATORY (INHALATION) at 15:12

## 2021-02-27 RX ADMIN — CARVEDILOL 25 MG: 25 TABLET, FILM COATED ORAL at 08:04

## 2021-02-27 RX ADMIN — ALBUTEROL SULFATE 2 PUFF: 90 AEROSOL, METERED RESPIRATORY (INHALATION) at 08:04

## 2021-02-27 RX ADMIN — RIVAROXABAN 20 MG: 20 TABLET, FILM COATED ORAL at 08:04

## 2021-02-28 PROCEDURE — 93005 ELECTROCARDIOGRAM TRACING: CPT | Performed by: INTERNAL MEDICINE

## 2021-02-28 PROCEDURE — 99232 SBSQ HOSP IP/OBS MODERATE 35: CPT | Performed by: PSYCHIATRY & NEUROLOGY

## 2021-02-28 PROCEDURE — 93010 ELECTROCARDIOGRAM REPORT: CPT | Performed by: INTERNAL MEDICINE

## 2021-02-28 RX ORDER — CHOLECALCIFEROL (VITAMIN D3) 125 MCG
5 CAPSULE ORAL NIGHTLY
Status: DISCONTINUED | OUTPATIENT
Start: 2021-02-28 | End: 2021-03-19 | Stop reason: HOSPADM

## 2021-02-28 RX ORDER — BUPROPION HYDROCHLORIDE 150 MG/1
150 TABLET ORAL DAILY
Status: DISCONTINUED | OUTPATIENT
Start: 2021-03-01 | End: 2021-03-02

## 2021-02-28 RX ADMIN — PANTOPRAZOLE SODIUM 40 MG: 40 TABLET, DELAYED RELEASE ORAL at 16:34

## 2021-02-28 RX ADMIN — RIVAROXABAN 20 MG: 20 TABLET, FILM COATED ORAL at 08:42

## 2021-02-28 RX ADMIN — Medication 5 MG: at 20:33

## 2021-02-28 RX ADMIN — HYDROCHLOROTHIAZIDE: 12.5 TABLET ORAL at 08:42

## 2021-02-28 RX ADMIN — ALBUTEROL SULFATE 2 PUFF: 90 AEROSOL, METERED RESPIRATORY (INHALATION) at 16:33

## 2021-02-28 RX ADMIN — ASPIRIN 81 MG: 81 TABLET, COATED ORAL at 08:42

## 2021-02-28 RX ADMIN — FLUOXETINE HYDROCHLORIDE 20 MG: 20 CAPSULE ORAL at 06:46

## 2021-02-28 RX ADMIN — ALBUTEROL SULFATE 2 PUFF: 90 AEROSOL, METERED RESPIRATORY (INHALATION) at 20:33

## 2021-02-28 RX ADMIN — CARVEDILOL 25 MG: 25 TABLET, FILM COATED ORAL at 08:42

## 2021-02-28 RX ADMIN — AMLODIPINE BESYLATE 10 MG: 10 TABLET ORAL at 08:42

## 2021-02-28 RX ADMIN — ALBUTEROL SULFATE 2 PUFF: 90 AEROSOL, METERED RESPIRATORY (INHALATION) at 08:43

## 2021-02-28 RX ADMIN — CARVEDILOL 25 MG: 25 TABLET, FILM COATED ORAL at 16:34

## 2021-03-01 PROBLEM — F33.2 SEVERE EPISODE OF RECURRENT MAJOR DEPRESSIVE DISORDER, WITHOUT PSYCHOTIC FEATURES (HCC): Status: ACTIVE | Noted: 2018-06-07

## 2021-03-01 LAB
T4 FREE SERPL-MCNC: 1.32 NG/DL (ref 0.93–1.7)
TSH SERPL DL<=0.05 MIU/L-ACNC: 3 UIU/ML (ref 0.27–4.2)

## 2021-03-01 PROCEDURE — 84439 ASSAY OF FREE THYROXINE: CPT | Performed by: PSYCHIATRY & NEUROLOGY

## 2021-03-01 PROCEDURE — 99233 SBSQ HOSP IP/OBS HIGH 50: CPT | Performed by: PSYCHIATRY & NEUROLOGY

## 2021-03-01 PROCEDURE — 84443 ASSAY THYROID STIM HORMONE: CPT | Performed by: PSYCHIATRY & NEUROLOGY

## 2021-03-01 RX ADMIN — CARVEDILOL 25 MG: 25 TABLET, FILM COATED ORAL at 20:47

## 2021-03-01 RX ADMIN — ALBUTEROL SULFATE 2 PUFF: 90 AEROSOL, METERED RESPIRATORY (INHALATION) at 09:38

## 2021-03-01 RX ADMIN — AMLODIPINE BESYLATE 10 MG: 10 TABLET ORAL at 09:39

## 2021-03-01 RX ADMIN — PANTOPRAZOLE SODIUM 40 MG: 40 TABLET, DELAYED RELEASE ORAL at 17:59

## 2021-03-01 RX ADMIN — Medication 5 MG: at 20:47

## 2021-03-01 RX ADMIN — BUPROPION HYDROCHLORIDE 150 MG: 150 TABLET, FILM COATED, EXTENDED RELEASE ORAL at 09:38

## 2021-03-01 RX ADMIN — CARVEDILOL 25 MG: 25 TABLET, FILM COATED ORAL at 09:40

## 2021-03-01 RX ADMIN — SERTRALINE 50 MG: 50 TABLET, FILM COATED ORAL at 14:03

## 2021-03-01 RX ADMIN — ALBUTEROL SULFATE 2 PUFF: 90 AEROSOL, METERED RESPIRATORY (INHALATION) at 15:34

## 2021-03-01 RX ADMIN — FLUOXETINE HYDROCHLORIDE 20 MG: 20 CAPSULE ORAL at 06:31

## 2021-03-01 RX ADMIN — ASPIRIN 81 MG: 81 TABLET, COATED ORAL at 09:39

## 2021-03-01 RX ADMIN — ALBUTEROL SULFATE 2 PUFF: 90 AEROSOL, METERED RESPIRATORY (INHALATION) at 20:47

## 2021-03-01 RX ADMIN — HYDROCHLOROTHIAZIDE: 12.5 TABLET ORAL at 11:12

## 2021-03-01 RX ADMIN — RIVAROXABAN 20 MG: 20 TABLET, FILM COATED ORAL at 09:39

## 2021-03-01 NOTE — PROGRESS NOTES
Patient has been scheduled the following appointment:     Mar 09, 2021 10:30 AM   Medicine Check with GLO Daniel   [facility] MEDICAL [facility] BE[facility]AVIORAL HE[facility] (--) [address] [facility] [id number] [phone]

## 2021-03-01 NOTE — PROGRESS NOTES
"INPATIENT PSYCHIATRIC PROGRESS NOTE    Name:  Ernesto Easley  :  1950  MRN:  [id number]  Visit Number:  [id number]  Length of stay:  3    Behavioral Health Treatment Plan and Problem List: I have reviewed and approved the Behavioral Health Treatment Plan and Problem list.    SUBJECTIVE    CC/ Focus of exam: Depression    Patient's subjective status: \"Not any good\"    INTERVAL HISTORY: Chart reviewed.  Remembered the patient well from 2018 and follow-up outpatient clinics, had a clear remission of his depressive symptomatology apparently till this past several months associated in time with an episode of COVID requiring hospitalization but not ventilation.  Since then clearly depressed with loss of interest in usual interest, weight loss and insomnia, a sense of hopelessness, although has denied suicidal ideation or intent prior to admission.  Today patient was not all at quick to respond to the question.    Understand that he has had cardiac issues with prolonged QTC and atrial fibrillation, is on anticoagulation.  This might be prohibitive for ECT which is worked so well for him with his initial episode of depression in 2018.  Zoloft probably the antidepressant of choice considering QTC issues.  Appreciate the cardiology consultation.  Depression rating 10/10  Anxiety rating 10/10  Sleep: Poor         Review of Systems   Respiratory: Negative.    Cardiovascular: Negative.    Gastrointestinal: Negative.    Neurological: Negative.          OBJECTIVE    Temp:  [97.1 °F (36.2 °C)-99 °F (37.2 °C)] 97.7 °F (36.5 °C)  Heart Rate:  [62-99] 62  Resp:  [18] 18  BP: (111-145)/(62-95) 128/85    MENTAL STATUS EXAM:        Psychomotor: No psychomotor agitation/retardation, No EPS, No motor tics  Speech-normal rate, amount.  Mood/Affect:  Depressed  Thought Processes:  Slow with suggestion of thought blocking  Thought Content:  negativistic and mood congruent  Hallucination(s): none  Hopelessness: Yes  Optimistic:No  Suicidal " Thoughts:   Hesitant to say yes or no.  Suicidal Plan/Intent:  No  Homicidal Thoughts:  absent  Orientation: oriented x 3  Memory: recent intact    Lab Results (last 24 hours)     ** No results found for the last 24 hours. **           Imaging Results (Last 24 Hours)     ** No results found for the last 24 hours. **           ECG/EMG Results (most recent)     Procedure Component Value Units Date/Time    Adult Transthoracic Echo Complete W/ Cont if Necessary Per Protocol [[id number]] Collected: 02/27/21 1308     Updated: 02/27/21 1335     BSA 2.1 m^2      IVSd 0.64 cm      LVIDd 4.4 cm      LVIDs 2.1 cm      LVPWd 0.84 cm      IVS/LVPW 0.77     FS 52.9 %      EDV(Teich) 86.3 ml      ESV(Teich) 13.7 ml      EF(Teich) 84.1 %      EDV(cubed) 83.5 ml      ESV(cubed) 8.7 ml      EF(cubed) 89.5 %      LV mass(C)d 97.9 grams      LV mass(C)dI 47.5 grams/m^2      SV(Teich) 72.6 ml      SI(Teich) 35.2 ml/m^2      SV(cubed) 74.7 ml      SI(cubed) 36.2 ml/m^2      Ao root diam 2.9 cm      Ao root area 6.4 cm^2      ACS 2.0 cm      LA dimension 3.8 cm      LA/Ao 1.3     LVOT diam 2.2 cm      LVOT area 3.8 cm^2      LVOT area(traced) 3.8 cm^2      LVLd ap4 7.1 cm      EDV(MOD-sp4) 66.8 ml      LVLs ap4 6.4 cm      ESV(MOD-sp4) 21.9 ml      EF(MOD-sp4) 67.2 %      SV(MOD-sp4) 44.9 ml      SI(MOD-sp4) 21.8 ml/m^2      Ao root area (BSA corrected) 1.4     LV Thompson Vol (BSA corrected) 32.4 ml/m^2      LV Sys Vol (BSA corrected) 10.6 ml/m^2      MV E max gabe 101.0 cm/sec      MV A max gabe 77.1 cm/sec      MV E/A 1.3     Ao pk gabe 103.0 cm/sec      Ao max PG 4.2 mmHg      Ao V2 mean 83.8 cm/sec      Ao mean PG 3.0 mmHg      Ao V2 VTI 18.0 cm      SV(Ao) 114.8 ml      SI(Ao) 55.7 ml/m^2      PA acc time 0.11 sec      TR max gabe 213.0 cm/sec      RVSP(TR) 28.1 mmHg      RAP systole 10.0 mmHg      PA pr(Accel) 31.3 mmHg       CV ECHO KEVON - BZI_BMI 31.0 kilograms/m^2       CV ECHO KEVON - BSA(HAYCOCK) 2.1 m^2       CV ECHO KEVON -  BZI_METRIC_WEIGHT 92.5 kg       CV ECHO KEVON - BZI_METRIC_HEIGHT 172.7 cm      Target HR (85%) 129 bpm      Max. Pred. HR (100%) 152 bpm     Narrative:      · Left ventricular ejection fraction appears to be 51 - 55%. Left   ventricular systolic function is normal.  · Left ventricular diastolic function was indeterminate.  · No significant functional valvular abnormalities noted  · There is no evidence of pericardial effusion       ECG 12 Lead [[id number]] Collected: 02/27/21 0153     Updated: 02/27/21 1617     QT Interval 432 ms      QTC Interval 522 ms     Narrative:      Test Reason : Baseline Cardiac Status  Blood Pressure : **/** mmHG  Vent. Rate : 088 BPM     Atrial Rate : 141 BPM     P-R Int : 000 ms          QRS Dur : 102 ms      QT Int : 432 ms       P-R-T Axes : 000 049 -81 degrees     QTc Int : 522 ms    Atrial fibrillation  Nonspecific ST and T wave abnormality  Prolonged QT  Abnormal ECG  When compared with ECG of 26-FEB-2021 18:33, (Unconfirmed)  QT has lengthened  Confirmed by Christopher Aguilar ([id number]) on 2/27/2021 4:17:17 PM    Referred By:             Confirmed By:Christopher Aguilar    ECG 12 Lead [[id number]] Collected: 02/28/21 0721     Updated: 02/28/21 0724     QT Interval 350 ms      QTC Interval 446 ms     Narrative:      Test Reason : qtc monitoring  Blood Pressure : **/** mmHG  Vent. Rate : 098 BPM     Atrial Rate : 441 BPM     P-R Int : 000 ms          QRS Dur : 098 ms      QT Int : 350 ms       P-R-T Axes : 000 046 -82 degrees     QTc Int : 446 ms    Atrial fibrillation  ST & T wave abnormality, consider inferior ischemia  Abnormal ECG  When compared with ECG of 27-FEB-2021 01:53,  QT has shortened    Referred By:  BAUDILIO           Confirmed By:            ALLERGIES: Patient has no known allergies.      Current Facility-Administered Medications:   •  acetaminophen (TYLENOL) tablet 650 mg, 650 mg, Oral, Q6H PRN, Luis Miguel Gallo MD  •  albuterol sulfate HFA (PROVENTIL HFA;VENTOLIN  HFA;PROAIR HFA) inhaler 2 puff, 2 puff, Inhalation, TID, Luis Miguel Gallo MD, 2 puff at 03/01/21 0938  •  ALPRAZolam (XANAX) tablet 0.5 mg, 0.5 mg, Oral, BID PRN, Luis Miguel Gallo MD  •  aluminum-magnesium hydroxide-simethicone (MAALOX MAX) 400-400-40 MG/5ML suspension 15 mL, 15 mL, Oral, Q6H PRN, Luis Miguel Gallo MD  •  amLODIPine (NORVASC) tablet 10 mg, 10 mg, Oral, Q24H, Luis Miguel Gallo MD, 10 mg at 03/01/21 0939  •  aspirin EC tablet 81 mg, 81 mg, Oral, Daily, Luis Miguel Gallo MD, 81 mg at 03/01/21 0939  •  benzonatate (TESSALON) capsule 100 mg, 100 mg, Oral, TID PRN, Luis Miguel Gallo MD  •  bisacodyl (DULCOLAX) EC tablet 5 mg, 5 mg, Oral, Daily PRN, Luis Miguel Gallo MD  •  buPROPion XL (WELLBUTRIN XL) 24 hr tablet 150 mg, 150 mg, Oral, Daily, Luis Miguel Gallo MD, 150 mg at 03/01/21 0938  •  carvedilol (COREG) tablet 25 mg, 25 mg, Oral, BID With Meals, Luis Miguel Gallo MD, 25 mg at 03/01/21 0940  •  FLUoxetine (PROzac) capsule 20 mg, 20 mg, Oral, QAM, Luis Miguel Gallo MD, 20 mg at 03/01/21 0631  •  ibuprofen (ADVIL,MOTRIN) tablet 400 mg, 400 mg, Oral, Q6H PRN, Luis Miguel Gallo MD  •  loperamide (IMODIUM) capsule 2 mg, 2 mg, Oral, Q2H PRN, Luis Miguel Gallo MD  •  losartan (COZAAR) 50 mg, hydroCHLOROthiazide (HYDRODIURIL) 12.5 mg, , Oral, Daily, Luis Miguel Gallo MD, Given at 02/28/21 0842  •  magnesium hydroxide (MILK OF MAGNESIA) suspension 2400 mg/10mL 10 mL, 10 mL, Oral, Daily PRN, Luis Miguel Gallo MD  •  melatonin tablet 3 mg, 3 mg, Oral, Nightly PRN, Luis Miguel Gallo MD  •  melatonin tablet 5 mg, 5 mg, Oral, Nightly, Luis Miguel Gallo MD, 5 mg at 02/28/21 2033  •  ondansetron (ZOFRAN) tablet 4 mg, 4 mg, Oral, Q6H PRN, Luis Miguel Gallo MD  •  pantoprazole (PROTONIX) EC tablet 40 mg, 40 mg, Oral, Q PM, Luis Miguel Gallo MD, 40 mg at 02/28/21 1634  •  rivaroxaban (XARELTO) tablet 20 mg, 20 mg, Oral, Daily, Luis Miguel Gallo MD, 20 mg at 03/01/21 0939  •  sodium chloride nasal spray 2 spray, 2 spray, Each Nare,  Bishop ALBA Jacob A, MD    ASSESSMENT & PLAN    Severe worsening depression, concern for safety from family.  Admit for crisis stabilization  -Depression appears to have worsened since patient suffered from COVID last month, compounded by A. fib diagnosis this past week  -Discontinuing the Prozac, will initiate treatment with sertraline.  -Begin Wellbutrin  mg daily  -Begin melatonin 5 mg nightly  -Patient received ECT in the past and may be a good candidate for treatment again  -Continue outpatient care     A. Fib  -Recent diagnosis  -Continue Xarelto and aspirin     Prolonged QTC  -Improved for 48  -Appreciate cardiology's involvement     Hypertension  -Continue Coreg 25 mg twice daily  -Continue Norvasc 10 mg daily  -Continue losartan 50 mg/HCTZ 12.5 mg daily      Special precautions: Special Precautions Level 3 (q15 min checks)     Behavioral Health Treatment Plan and Problem List: I have reviewed and approved the Behavioral Health Treatment Plan and Problem list.    I spent a total of 42 minutes in direct patient care including  28 minutes face to face with the patient assessment, coordination of care, and counseling the patient on the current and follow-up treatment plans regarding his status and treatment options. No questions. .    JEAN PAUL De Leon MD    Clinician:  Thomas De Leon MD  03/01/21  11:08 EST    Dictated utilizing Dragon dictation

## 2021-03-01 NOTE — PHARMACY PATIENT ASSISTANCE
Pharmacy checked on pricing for xarelto.  Last fill it was supposed to cost $303.12 due to a deductible.  The pharmacy has it ready now for  with a $0 copay with a free 30 day trial card.  If they pay the $303, then the next fill will only cost $ 47 for a 30 day supply at a local pharmacy or if they get a 90 day supply through mail order it would be $90.  We spoke to wife and explained and she said she thought that his physician was going to take him off of it anyway.    Thank you;  Idalia Nieto, PharmD  03/01/21  13:09 EST

## 2021-03-01 NOTE — PLAN OF CARE
Goal Outcome Evaluation:  Plan of Care Reviewed With: other (see comments)(nursing staff)  Progress: no change  Outcome Summary: Staffed case with nursing staff. Patient still very much depressed related to medical issues and recent period of isolation. Patient has been staying in his room the majority of the time, only coming out for meals.      Problem: Adult Behavioral Health Plan of Care  Goal: Plan of Care Review  Outcome: Ongoing, Progressing  Flowsheets  Taken 3/1/2021 1350 by Olu Cheek, LCSW  Progress: no change  Plan of Care Reviewed With: (nursing staff) other (see comments)  Outcome Summary: Staffed case with nursing staff. Patient still very much depressed related to medical issues and recent period of isolation. Patient has been staying in his room the majority of the time, only coming out for meals.  Taken 2/27/2021 1036 by Sobia Stein, MSW  Consent Given to Review Plan with: Spouse, Katarzyna Easley

## 2021-03-01 NOTE — PLAN OF CARE
Goal Outcome Evaluation:  Plan of Care Reviewed With: patient  Progress: no change  Outcome Summary: Patient continues to isolate in his room. Reports high levels of anxiety and depression. Denies SI/HI and CHRISTY.

## 2021-03-01 NOTE — PROGRESS NOTES
"[phone]  Spoke with Katarzyna, patient's spouse, who was reachable at [phone]. She updated the therapist briefly about the events that had led to the patient's admission. However, she primarily wanted to let the therapist know about potential symptoms of psychosis. She explained that just prior to admission in the ED the patient was explaining his belief that he was \"dead\" and \"walking around in eternity\" without any means of escape. He was also saying he believed spirits were making his bones pop and the house creak. He was also seeing cracks in the mirror, presumably caused by spirits. She was very concerned, asking if the patient would be okay. She requested to speak with Dr. De Leon tomorrow as well.   "

## 2021-03-01 NOTE — PLAN OF CARE
Goal Outcome Evaluation:  Plan of Care Reviewed With: patient  Progress: no change  Outcome Summary: Patient rates anxiety and depression a 5, has been in bed this whole shift, wife called to check on him and reported that patient was hallcinating at home thought he was walking around dead and has been extremly depressed. no changes in treated this shift.

## 2021-03-02 ENCOUNTER — APPOINTMENT (OUTPATIENT)
Dept: GENERAL RADIOLOGY | Facility: HOSPITAL | Age: 71
End: 2021-03-02

## 2021-03-02 LAB
ANION GAP SERPL CALCULATED.3IONS-SCNC: 10.7 MMOL/L (ref 5–15)
BUN SERPL-MCNC: 18 MG/DL (ref 8–23)
BUN/CREAT SERPL: 14 (ref 7–25)
CALCIUM SPEC-SCNC: 10.5 MG/DL (ref 8.6–10.5)
CHLORIDE SERPL-SCNC: 101 MMOL/L (ref 98–107)
CO2 SERPL-SCNC: 26.3 MMOL/L (ref 22–29)
CREAT SERPL-MCNC: 1.29 MG/DL (ref 0.76–1.27)
GFR SERPL CREATININE-BSD FRML MDRD: 55 ML/MIN/1.73
GLUCOSE SERPL-MCNC: 124 MG/DL (ref 65–99)
MAGNESIUM SERPL-MCNC: 2 MG/DL (ref 1.6–2.4)
POTASSIUM SERPL-SCNC: 4.2 MMOL/L (ref 3.5–5.2)
SODIUM SERPL-SCNC: 138 MMOL/L (ref 136–145)

## 2021-03-02 PROCEDURE — 71046 X-RAY EXAM CHEST 2 VIEWS: CPT

## 2021-03-02 PROCEDURE — 71046 X-RAY EXAM CHEST 2 VIEWS: CPT | Performed by: RADIOLOGY

## 2021-03-02 PROCEDURE — 99222 1ST HOSP IP/OBS MODERATE 55: CPT | Performed by: PHYSICIAN ASSISTANT

## 2021-03-02 PROCEDURE — 83735 ASSAY OF MAGNESIUM: CPT | Performed by: PHYSICIAN ASSISTANT

## 2021-03-02 PROCEDURE — 80048 BASIC METABOLIC PNL TOTAL CA: CPT | Performed by: PHYSICIAN ASSISTANT

## 2021-03-02 PROCEDURE — 99233 SBSQ HOSP IP/OBS HIGH 50: CPT | Performed by: PSYCHIATRY & NEUROLOGY

## 2021-03-02 RX ORDER — BUPROPION HYDROCHLORIDE 150 MG/1
300 TABLET ORAL DAILY
Status: DISCONTINUED | OUTPATIENT
Start: 2021-03-03 | End: 2021-03-19 | Stop reason: HOSPADM

## 2021-03-02 RX ADMIN — SERTRALINE 50 MG: 50 TABLET, FILM COATED ORAL at 09:30

## 2021-03-02 RX ADMIN — HYDROCHLOROTHIAZIDE: 12.5 TABLET ORAL at 09:30

## 2021-03-02 RX ADMIN — CARVEDILOL 25 MG: 25 TABLET, FILM COATED ORAL at 09:30

## 2021-03-02 RX ADMIN — RIVAROXABAN 20 MG: 20 TABLET, FILM COATED ORAL at 09:30

## 2021-03-02 RX ADMIN — ALBUTEROL SULFATE 2 PUFF: 90 AEROSOL, METERED RESPIRATORY (INHALATION) at 09:31

## 2021-03-02 RX ADMIN — AMLODIPINE BESYLATE 10 MG: 10 TABLET ORAL at 09:30

## 2021-03-02 RX ADMIN — Medication 5 MG: at 21:52

## 2021-03-02 RX ADMIN — PANTOPRAZOLE SODIUM 40 MG: 40 TABLET, DELAYED RELEASE ORAL at 16:22

## 2021-03-02 RX ADMIN — ASPIRIN 81 MG: 81 TABLET, COATED ORAL at 09:30

## 2021-03-02 RX ADMIN — BUPROPION HYDROCHLORIDE 150 MG: 150 TABLET, FILM COATED, EXTENDED RELEASE ORAL at 09:30

## 2021-03-02 RX ADMIN — ALBUTEROL SULFATE 2 PUFF: 90 AEROSOL, METERED RESPIRATORY (INHALATION) at 15:31

## 2021-03-02 RX ADMIN — ALBUTEROL SULFATE 2 PUFF: 90 AEROSOL, METERED RESPIRATORY (INHALATION) at 21:52

## 2021-03-02 NOTE — PLAN OF CARE
Goal Outcome Evaluation:  Plan of Care Reviewed With: patient  Progress: improving  Outcome Summary: Therapist met with Patient to discuss treatment progress and dispositon plans; Patient agreeable.    Problem: Adult Behavioral Health Plan of Care  Goal: Plan of Care Review  Outcome: Ongoing, Progressing  Flowsheets (Taken 3/2/2021 0952)  Progress: improving  Plan of Care Reviewed With: patient  Patient Agreement with Plan of Care: agrees  Outcome Summary:   Therapist met with Patient to discuss treatment progress and dispositon plans   Patient agreeable.  Goal: Optimized Coping Skills in Response to Life Stressors  Outcome: Ongoing, Progressing  Intervention: Promote Effective Coping Strategies  Flowsheets (Taken 3/2/2021 0952)  Supportive Measures:   active listening utilized   decision-making supported   positive reinforcement provided   verbalization of feelings encouraged  Goal: Develops/Participates in Therapeutic [place] to Support Successful Transition  Outcome: Ongoing, Progressing  Intervention: Foster Therapeutic [place]  Flowsheets (Taken 3/2/2021 0952)  Trust Relationship/Rapport:   care explained   questions encouraged   reassurance provided   choices provided   emotional support provided   thoughts/feelings acknowledged   empathic listening provided   questions answered  Intervention: Mutually Develop Transition Plan  Flowsheets (Taken 3/2/2021 0952)  Transition Support:   community resources reviewed   follow-up care coordinated   follow-up care discussed   crisis management plan promoted   crisis management plan verbalized    0953:    DATA: Therapist met with Patient individually this date. Patient agreeable to discuss current treatment progress and discharge concerns.    CLINICAL MANUVERING/INTERVENTIONS:  Assisted Patient in processing session content; acknowledged and normalized Patient’s thoughts, feelings, and concerns by utilizing a person-centered approach in efforts to build appropriate  "rapport and a positive therapeutic relationship with open and honest communication. Allowed Patient to ventilate regarding current stressors and triggers for negative emotions and thoughts in a safe nonjudgmental environment with unconditional positive regard, active listening skills, and empathy.     ASSESSMENT: Patient was seen today for a follow up. He continues to isolate to his room. When asked how he is feeling today he replied \"Not so hot, just really down I guess.\" Patient is unable to identify why he feels this way and states the only thing he knows to correlate it with is having COVID in December. He reports he was doing well until then. We discussed how he used to see Dr. De Leon in the clinic years ago and felt like ECT was successful for him. He reports he is open to the idea of ECT again and feels like it may be the only thing that can help him. Patient denies any stressors at home and states his family is very supportive.     PLAN: Patient will continue stabilization. Patient will continue to receive services offered by Treatment Team.     Patient will follow-up with the [facility].     Assistance with transportation will not be needed. Family member will provide.   "

## 2021-03-02 NOTE — PROGRESS NOTES
"INPATIENT PSYCHIATRIC PROGRESS NOTE    Name:  Ernesto Easley  :  1950  MRN:  [id number]  Visit Number:  [id number]  Length of stay:  4    Behavioral Health Treatment Plan and Problem List: I have reviewed and approved the Behavioral Health Treatment Plan and Problem list.    SUBJECTIVE    CC/ Focus of exam: Depression    Patient's subjective status: \"I just want to go home\"    INTERVAL HISTORY: Patient remains severely depressed with a sense of hopelessness, feelings that he be better off dead, and does not actually deny that he is considering suicide.  Not eating, is convinced treatment will not help, is at serious risk for self-harm prohibitive of discharge.  See therapist note regarding patient's delusional thought that he is actually dead.  Contacted the patient's spouse ([phone]) with the patient's presents discussed the treatment options particularly ECT that has proven  success in the past.  Noted that her is probably an increased risk with the patient's cardiac status and anticoagulation and would certainly need clearance from the medical team prior to proceeding with ECT if in fact the patient and his family would consent for this treatment option.  Patient declined to read or signed the ECT consent form this morning.    We will be continuing with these sertraline and bupropion meds for now.    Depression rating 10/10  Anxiety rating 10/10  Sleep: Poor     Review of Systems   Respiratory: Negative.    Cardiovascular: Negative.    Gastrointestinal: Negative.    Neurological: Negative.          OBJECTIVE    Temp:  [97.5 °F (36.4 °C)-97.9 °F (36.6 °C)] 97.9 °F (36.6 °C)  Heart Rate:  [57-89] 69  Resp:  [18] 18  BP: (107-141)/(67-93) 121/84    MENTAL STATUS EXAM:        Psychomotor: No psychomotor agitation/retardation, No EPS, No motor tics  Speech-normal rate, amount.  Mood/Affect:  Depressed  Thought Processes:  Slow and suggestive of thought blocking  Thought Content:  dilussional, negativistic and " mood congruent  Hallucination(s): none  Hopelessness: Yes  Optimistic:No  Suicidal Thoughts:   Yes  Suicidal Plan/Intent:  No  Homicidal Thoughts:  absent  Orientation: oriented x 3  Memory: recent intact    Lab Results (last 24 hours)     Procedure Component Value Units Date/Time    T4, Free [[id number]]  (Normal) Collected: 03/01/21 1345    Specimen: Blood Updated: 03/01/21 1450     Free T4 1.32 ng/dL     Narrative:      Results may be falsely increased if patient taking Biotin.      TSH [[id number]]  (Normal) Collected: 03/01/21 1345    Specimen: Blood Updated: 03/01/21 1450     TSH 3.000 uIU/mL            Imaging Results (Last 24 Hours)     ** No results found for the last 24 hours. **           ECG/EMG Results (most recent)     Procedure Component Value Units Date/Time    Adult Transthoracic Echo Complete W/ Cont if Necessary Per Protocol [[id number]] Collected: 02/27/21 1308     Updated: 02/27/21 1335     BSA 2.1 m^2      IVSd 0.64 cm      LVIDd 4.4 cm      LVIDs 2.1 cm      LVPWd 0.84 cm      IVS/LVPW 0.77     FS 52.9 %      EDV(Teich) 86.3 ml      ESV(Teich) 13.7 ml      EF(Teich) 84.1 %      EDV(cubed) 83.5 ml      ESV(cubed) 8.7 ml      EF(cubed) 89.5 %      LV mass(C)d 97.9 grams      LV mass(C)dI 47.5 grams/m^2      SV(Teich) 72.6 ml      SI(Teich) 35.2 ml/m^2      SV(cubed) 74.7 ml      SI(cubed) 36.2 ml/m^2      Ao root diam 2.9 cm      Ao root area 6.4 cm^2      ACS 2.0 cm      LA dimension 3.8 cm      LA/Ao 1.3     LVOT diam 2.2 cm      LVOT area 3.8 cm^2      LVOT area(traced) 3.8 cm^2      LVLd ap4 7.1 cm      EDV(MOD-sp4) 66.8 ml      LVLs ap4 6.4 cm      ESV(MOD-sp4) 21.9 ml      EF(MOD-sp4) 67.2 %      SV(MOD-sp4) 44.9 ml      SI(MOD-sp4) 21.8 ml/m^2      Ao root area (BSA corrected) 1.4     LV Thompson Vol (BSA corrected) 32.4 ml/m^2      LV Sys Vol (BSA corrected) 10.6 ml/m^2      MV E max gabe 101.0 cm/sec      MV A max gabe 77.1 cm/sec      MV E/A 1.3     Ao pk gabe 103.0 cm/sec      Ao max PG 4.2  mmHg      Ao V2 mean 83.8 cm/sec      Ao mean PG 3.0 mmHg      Ao V2 VTI 18.0 cm      SV(Ao) 114.8 ml      SI(Ao) 55.7 ml/m^2      PA acc time 0.11 sec      TR max gabe 213.0 cm/sec      RVSP(TR) 28.1 mmHg      RAP systole 10.0 mmHg      PA pr(Accel) 31.3 mmHg       CV ECHO KEVON - BZI_BMI 31.0 kilograms/m^2       CV ECHO KEVON - BSA(HAYCOCK) 2.1 m^2       CV ECHO KEVON - BZI_METRIC_WEIGHT 92.5 kg       CV ECHO KEVON - BZI_METRIC_HEIGHT 172.7 cm      Target HR (85%) 129 bpm      Max. Pred. HR (100%) 152 bpm     Narrative:      · Left ventricular ejection fraction appears to be 51 - 55%. Left   ventricular systolic function is normal.  · Left ventricular diastolic function was indeterminate.  · No significant functional valvular abnormalities noted  · There is no evidence of pericardial effusion       ECG 12 Lead [[id number]] Collected: 02/27/21 0153     Updated: 02/27/21 1617     QT Interval 432 ms      QTC Interval 522 ms     Narrative:      Test Reason : Baseline Cardiac Status  Blood Pressure : **/** mmHG  Vent. Rate : 088 BPM     Atrial Rate : 141 BPM     P-R Int : 000 ms          QRS Dur : 102 ms      QT Int : 432 ms       P-R-T Axes : 000 049 -81 degrees     QTc Int : 522 ms    Atrial fibrillation  Nonspecific ST and T wave abnormality  Prolonged QT  Abnormal ECG  When compared with ECG of 26-FEB-2021 18:33, (Unconfirmed)  QT has lengthened  Confirmed by Christopher Aguilar ([id number]) on 2/27/2021 4:17:17 PM    Referred By:             Confirmed By:Christopher Aguilar    ECG 12 Lead [[id number]] Collected: 02/28/21 0721     Updated: 02/28/21 0724     QT Interval 350 ms      QTC Interval 446 ms     Narrative:      Test Reason : qtc monitoring  Blood Pressure : **/** mmHG  Vent. Rate : 098 BPM     Atrial Rate : 441 BPM     P-R Int : 000 ms          QRS Dur : 098 ms      QT Int : 350 ms       P-R-T Axes : 000 046 -82 degrees     QTc Int : 446 ms    Atrial fibrillation  ST & T wave abnormality, consider inferior  ischemia  Abnormal ECG  When compared with ECG of 27-FEB-2021 01:53,  QT has shortened    Referred By:  BAUDILIO           Confirmed By:            ALLERGIES: Patient has no known allergies.      Current Facility-Administered Medications:   •  acetaminophen (TYLENOL) tablet 650 mg, 650 mg, Oral, Q6H PRN, Luis Miguel Gallo MD  •  albuterol sulfate HFA (PROVENTIL HFA;VENTOLIN HFA;PROAIR HFA) inhaler 2 puff, 2 puff, Inhalation, TID, Luis Miguel Gallo MD, 2 puff at 03/02/21 0931  •  ALPRAZolam (XANAX) tablet 0.5 mg, 0.5 mg, Oral, BID PRN, Luis Miguel Gallo MD  •  aluminum-magnesium hydroxide-simethicone (MAALOX MAX) 400-400-40 MG/5ML suspension 15 mL, 15 mL, Oral, Q6H PRN, Luis Miguel Gallo MD  •  amLODIPine (NORVASC) tablet 10 mg, 10 mg, Oral, Q24H, Luis Miguel Gallo MD, 10 mg at 03/02/21 0930  •  aspirin EC tablet 81 mg, 81 mg, Oral, Daily, Luis Miguel Gallo MD, 81 mg at 03/02/21 0930  •  benzonatate (TESSALON) capsule 100 mg, 100 mg, Oral, TID PRN, Luis Miguel Gallo MD  •  bisacodyl (DULCOLAX) EC tablet 5 mg, 5 mg, Oral, Daily PRN, Luis Miguel Gallo MD  •  buPROPion XL (WELLBUTRIN XL) 24 hr tablet 150 mg, 150 mg, Oral, Daily, Luis Miguel Gallo MD, 150 mg at 03/02/21 0930  •  carvedilol (COREG) tablet 25 mg, 25 mg, Oral, BID With Meals, Luis Miguel Gallo MD, 25 mg at 03/02/21 0930  •  ibuprofen (ADVIL,MOTRIN) tablet 400 mg, 400 mg, Oral, Q6H PRN, Luis Miguel Gallo MD  •  loperamide (IMODIUM) capsule 2 mg, 2 mg, Oral, Q2H PRN, Luis Miguel Gallo MD  •  losartan (COZAAR) 50 mg, hydroCHLOROthiazide (HYDRODIURIL) 12.5 mg, , Oral, Daily, Luis Miguel Gallo MD, Given at 03/02/21 0930  •  magnesium hydroxide (MILK OF MAGNESIA) suspension 2400 mg/10mL 10 mL, 10 mL, Oral, Daily PRN, Luis Miguel Gallo MD  •  melatonin tablet 3 mg, 3 mg, Oral, Nightly PRN, Luis Miguel Gallo MD  •  melatonin tablet 5 mg, 5 mg, Oral, Nightly, Luis Miguel Gallo MD, 5 mg at 03/01/21 2047  •  ondansetron (ZOFRAN) tablet 4 mg, 4 mg, Oral, Q6H PRN, Luis Miguel Gallo MD  •   pantoprazole (PROTONIX) EC tablet 40 mg, 40 mg, Oral, Q PM, Luis Miguel Gallo MD, 40 mg at 03/01/21 9599  •  rivaroxaban (XARELTO) tablet 20 mg, 20 mg, Oral, Daily, Luis Miguel Gallo MD, 20 mg at 03/02/21 0930  •  sertraline (ZOLOFT) tablet 50 mg, 50 mg, Oral, Daily, Thomas De Leon MD, 50 mg at 03/02/21 0930  •  sodium chloride nasal spray 2 spray, 2 spray, Each Nare, PRN, Luis Miguel Gallo MD    ASSESSMENT & PLAN    Severe worsening depression, concern for safety from family.  Admit for crisis stabilization  -Depression appears to have worsened since patient suffered from COVID last month, compounded by ANaya [place] diagnosis this past week  - Sertraline.  - Wellbutrin  mg daily  - melatonin 5 mg nightly  - Patient received ECT in the past and may be a good candidate for treatment again  -Continue outpatient care     ANaya [place]  -Recent diagnosis  -Continue Xarelto and aspirin     Prolonged QTC  -Improved for 46  -Appreciate cardiology's involvement     Hypertension  -Continue Coreg 25 mg twice daily  -Continue Norvasc 10 mg daily  -Continue losartan 50 mg/HCTZ 12.5 mg daily      Special precautions: Special Precautions Level 3 (q15 min checks)     Behavioral Health Treatment Plan and Problem List: I have reviewed and approved the Behavioral Health Treatment Plan and Problem list.    I spent a total of 40 minutes in direct patient care including  28 minutes face to face with the patient assessment, coordination of care, and counseling the patient on the current and follow-up treatment plans regarding his status. Answered the patient's and his wife's regarding his status and treatment options including questions about ECT.     JEAN PAUL De Leon MD    Clinician:  Thomas De Leon MD  03/02/21  11:48 EST    Dictated utilizing Dragon dictation

## 2021-03-02 NOTE — PLAN OF CARE
Goal Outcome Evaluation:  Plan of Care Reviewed With: patient  Progress: no change  Outcome Summary: Patient continues to isolate in his room and voice high levels of depression. SP2 for safety.

## 2021-03-02 NOTE — PLAN OF CARE
Goal Outcome Evaluation:  Plan of Care Reviewed With: patient  Progress: no change  Outcome Summary: Patient continues to remain in room.  patient reports anxiety and depression at 10.  Patient denies SI/HI.  Reports poor appetite.

## 2021-03-02 NOTE — CONSULTS
[facility] Medicine Services  CONSULT NOTE    Inpatient Hospitalist Consult  Consult performed by: Christiana Holland PA-C  Consult ordered by: Thomas De Leon MD          Patient Identification:  Name:  Ernesto Easley  Age:  70 y.o.  Sex:  male  :  1950  MRN:  [id number]  Visit Number:  [id number]  Primary care provider:  Kervin Prince APRN    Subjective       History of presenting illness:    Ernesto Easley is a 70 y.o. male admitted Dr. Luis Miguel Gallo with known medical history of atrial fibrillation, depression, pyloric ulcer, covid-19 infection.  Ernesto Easley is being evaluated by the Hospital Medicine Service at the request of Psychiatry team for ECT clearance.  Mr. Easley was admitted to the [facility] on 2021 after presentation to the emergency department on the late evening of 2021 for mental health evaluation with reports of worsening severe depression for 2 months duration.  He reported he was diagnosed with COVID-19 in 2021 and later had to be hospitalized  through  with worsening depression following isolation.  He was admitted for further evaluation and treatment.  Since being admitted to the [facility] he has had cardiology consult due to his known history of persistent atrial fibrillation with EKG revealing QTC of 522 ms on EKG on the morning of  with QTC within normal limits on prior day.  It was recommended trazodone be stopped with EKG rechecked and echocardiogram findings reviewed with LV systolic function preserved.    On 3/2/2021 a consultation was placed with hospital medicine for further evaluation for ECT clearance as he remains severely depressed with a sense of hopelessness and feelings he might be better off dead in spite of medication titrations with discussion with Mr. Easley family with prior success with ECT treatments.      Mr. Easley reports he has been on xarelto for atrial fibrillation since  December 2020 when he had COVID-19.  He reports he has also slept with supplemental O2 since this time and had worsening depression. He is very withdrawn and answers questions with very limited answers. He does report he has tolerated ECT in the past . He denies known prior difficulty with anesthesia.   He denies chest pain, dyspnea, cough, and wheeze.        ---------------------------------------------------------------------------------------------------------------------  Review of Systems   Constitutional: Negative for chills and fatigue.   HENT: Negative for congestion and drooling.    Eyes: Negative for pain and discharge.   Respiratory: Negative for cough, shortness of breath and wheezing.    Cardiovascular: Negative for chest pain and leg swelling.   Gastrointestinal: Negative for abdominal distention, diarrhea and nausea.   Endocrine: Negative for cold intolerance.   Genitourinary: Negative for difficulty urinating and dysuria.   Musculoskeletal: Negative for arthralgias and gait problem.   Skin: Negative for color change and pallor.   Allergic/Immunologic: Negative for environmental allergies and food allergies.   Neurological: Negative for dizziness and headaches.   Hematological: Negative for adenopathy. Does not bruise/bleed easily.   Psychiatric/Behavioral:        Depression          Otherwise 10-system ROS reviewed and is negative except as mentioned in the HPI.  ---------------------------------------------------------------------------------------------------------------------   Past History:  Family History   Problem Relation Age of Onset   • Dementia Sister      Past Medical History:   Diagnosis Date   • A-fib (CMS/[facility])    • A-fib (CMS/HCC)     diagnosed last week. started on xarelto.    • Anxiety    • Depression    • Helicobacter pylori (H. pylori) infection    • Hernia of abdominal cavity    • HTN (hypertension)    • Pyloric ulcer      Past Surgical History:   Procedure Laterality Date   •  ELECTROCONVULSIVE THERAPY Bilateral 6/14/2018    Procedure: ELECTROCONVULSIVE THERAPY;  Surgeon: Thomas De Leon MD;  Location:  [facility] OR;  Service: ECT   • ELECTROCONVULSIVE THERAPY N/A 6/15/2018    Procedure: ELECTROCONVULSIVE THERAPY, 60 seconds;  Surgeon: Thomas De Leon MD;  Location: [facility] OR;  Service: ECT   • ELECTROCONVULSIVE THERAPY N/A 6/19/2018    Procedure: ELECTROCONVULSIVE THERAPY; 80 seconds;  Surgeon: Thomas De Leon MD;  Location: [facility] OR;  Service: ECT   • ENDOSCOPY     • TOE NAIL AVULSION       Social History     Socioeconomic History   • Marital status:      Spouse name: Not on file   • Number of children: Not on file   • Years of education: Not on file   • Highest education level: Not on file   Tobacco Use   • Smoking status: Never Smoker   • Smokeless tobacco: Never Used   Substance and Sexual Activity   • Alcohol use: No     Comment: denies   • Drug use: No     Comment: denies   • Sexual activity: Defer     Partners: Female     Comment:  for 49 years     ---------------------------------------------------------------------------------------------------------------------   Allergies:  Patient has no known allergies.  ---------------------------------------------------------------------------------------------------------------------   Prior to Admission Medications     Prescriptions Last Dose Informant Patient Reported? Taking?    albuterol sulfate  (90 Base) MCG/ACT inhaler 2/26/2021 Spouse/Significant Other Yes Yes    Inhale 2 puffs 3 (Three) Times a Day.    ALPRAZolam (XANAX) 0.5 MG tablet 2/25/2021 Spouse/Significant Other Yes Yes    Take 0.5 mg by mouth 2 (Two) Times a Day As Needed for Anxiety. Prior to [facility] Admission, Patient was on:   Xanax 0.5mg Bid x 7 days from [place]    amLODIPine (NORVASC) 10 MG tablet 2/26/2021 Spouse/Significant Other No Yes    Take 1 tablet by mouth Daily.    aspirin (aspirin) 81 MG EC tablet 2/26/2021  Spouse/Significant Other Yes Yes    Take 81 mg by mouth Daily. Indications: hypertension    carvedilol (COREG) 25 MG tablet 2/26/2021 Spouse/Significant Other Yes Yes    Take 25 mg by mouth 2 (Two) Times a Day With Meals.    FLUoxetine (PROzac) 20 MG capsule 2/26/2021 Spouse/Significant Other Yes Yes    Take 20 mg by mouth Every Morning.    irbesartan-hydrochlorothiazide (AVALIDE) 150-12.5 MG tablet 2/26/2021 Spouse/Significant Other Yes Yes    Take 2 tablets by mouth Every Morning.    pantoprazole (PROTONIX) 40 MG EC tablet 2/25/2021 Spouse/Significant Other No Yes    Take 1 tablet by mouth Every Evening.    rivaroxaban (XARELTO) 20 MG tablet 2/26/2021 Spouse/Significant Other Yes Yes    Take 20 mg by mouth Daily.        [facility] Meds:  albuterol sulfate HFA, 2 puff, Inhalation, TID  amLODIPine, 10 mg, Oral, Q24H  aspirin, 81 mg, Oral, Daily  [START ON 3/3/2021] buPROPion XL, 300 mg, Oral, Daily  carvedilol, 25 mg, Oral, BID With Meals  melatonin, 5 mg, Oral, Nightly  pantoprazole, 40 mg, Oral, Q PM  rivaroxaban, 20 mg, Oral, Daily  [START ON 3/3/2021] sertraline, 100 mg, Oral, Daily         ---------------------------------------------------------------------------------------------------------------------     Objective     Vital Signs:  Temp:  [97.5 °F (36.4 °C)-97.9 °F (36.6 °C)] 97.9 °F (36.6 °C)  Heart Rate:  [57-89] 69  Resp:  [18] 18  BP: (107-141)/(67-93) 121/84      02/26/21  1855   Weight: 92.9 kg (204 lb 12.8 oz)     Body mass index is 31.14 kg/m².  ---------------------------------------------------------------------------------------------------------------------     Physical Exam  Constitutional:       Appearance: Normal appearance.   HENT:      Head: Normocephalic and atraumatic.   Eyes:      General: Lids are normal.      Conjunctiva/sclera:      Right eye: Right conjunctiva is not injected.      Left eye: Left conjunctiva is not injected.   Cardiovascular:      Rate and Rhythm: Normal rate and  regular rhythm.      Heart sounds: S1 normal and S2 normal.   Pulmonary:      Effort: No tachypnea or bradypnea.      Breath sounds: No decreased breath sounds, wheezing, rhonchi or rales.   Abdominal:      General: Bowel sounds are normal.      Palpations: Abdomen is soft.      Tenderness: There is no abdominal tenderness.   Musculoskeletal:      Right lower leg: No edema.      Left lower leg: No edema.   Skin:     General: Skin is warm and dry.   Neurological:      Mental Status: He is alert and oriented to person, place, and time.   Psychiatric:         Speech: Speech is not rapid and pressured or slurred.         Behavior: Behavior is withdrawn. Behavior is cooperative.         ---------------------------------------------------------------------------------------------------------------------   EKG:            EKGs reviewed with cardiology input and improvement in QTc after trazodone has been stopped    ---------------------------------------------------------------------------------------------------------------------   Results from last 7 days   Lab Units 02/26/21  1649   WBC 10*3/mm3 9.23   HEMOGLOBIN g/dL 14.4   HEMATOCRIT % 42.1   MCV fL 93.8   MCHC g/dL 34.2   PLATELETS 10*3/mm3 295         Results from last 7 days   Lab Units 03/02/21  1410 02/26/21  1649   SODIUM mmol/L 138 135*   POTASSIUM mmol/L 4.2 3.9   MAGNESIUM mg/dL 2.0 1.8   CHLORIDE mmol/L 101 100   CO2 mmol/L 26.3 26.5   BUN mg/dL 18 15   CREATININE mg/dL 1.29* 1.06   EGFR IF NONAFRICN AM mL/min/1.73 55* 69   CALCIUM mg/dL 10.5 9.6   GLUCOSE mg/dL 124* 108*   ALBUMIN g/dL  --  4.06   BILIRUBIN mg/dL  --  0.5   ALK PHOS U/L  --  94   AST (SGOT) U/L  --  16   ALT (SGPT) U/L  --  19   Estimated Creatinine Clearance: 60.6 mL/min (A) (by C-G formula based on SCr of 1.29 mg/dL (H)).  No results found for: AMMONIA          Lab Results   Component Value Date    HGBA1C 5.00 06/09/2018     Lab Results   Component Value Date    TSH 3.000 03/01/2021     FREET4 1.32 03/01/2021     No results found for: PREGTESTUR, PREGSERUM, HCG, HCGQUANT  Pain Management Panel     Pain Management Panel Latest Ref Rng & Units 2/26/2021 6/7/2018    AMPHETAMINES SCREEN, URINE Negative Negative Negative    BARBITURATES SCREEN Negative Negative Negative    BENZODIAZEPINE SCREEN, URINE Negative Positive(A) Positive(A)    BUPRENORPHINEUR Negative Negative Negative    COCAINE SCREEN, URINE Negative Negative Negative    METHADONE SCREEN, URINE Negative Negative Negative        No results found for: BLOODCX  No results found for: URINECX  No results found for: WOUNDCX  No results found for: STOOLCX  Results from last 7 days   Lab Units 02/27/21  0711   CHOLESTEROL mg/dL 162   TRIGLYCERIDES mg/dL 328*   HDL CHOL mg/dL 28*   LDL CHOL mg/dL 80     ---------------------------------------------------------------------------------------------------------------------   Imaging Results (Last 7 Days)     Procedure Component Value Units Date/Time    XR Chest PA & Lateral [[id number]] Collected: 03/02/21 1306     Updated: 03/02/21 1414    Narrative:      EXAMINATION: XR CHEST PA AND LATERAL-      CLINICAL INDICATION: Pre-ECT clearance        COMPARISON: 06/07/2018      TECHNIQUE: XR CHEST PA AND LATERAL-      FINDINGS:   LUNGS: Area of increased density in the right lung. Recommend CT of the  chest.      HEART AND MEDIASTINUM: Heart and mediastinal contours are unremarkable        SKELETON: Bony and soft tissue structures are unremarkable.             Impression:      Area of increased density in the right lung. Recommend CT of the chest.     This report was finalized on 3/2/2021 2:12 PM by Dr. Randolph Ma MD.           CHADS-VASc Risk Assessment            2       Total Score        1 Hypertension    1 Age 65-74        Criteria that do not apply:    CHF    Age >/= 75    DM    PRIOR STROKE/TIA/THROMBO    Vascular Disease    Sex: Female            I have personally reviewed the radiology images and  read the final radiology report.        Assessment / Plan     Assessment and Plan:  • Pre-ECT clearance evaluation:  -CT head ordered/pending BMP.   -CXR ordered and pending  -CBC & BMP from 2/26 reviewed.  -BMP from 3/2 reviewed upon return.    -EKG from 02/28 reviewed with improved QTc.   -History of tolerating ECT procedure with review of record with dramatic improvement with subsequent treatments on June 14 and June 19 with reported resolution of depression at said time.   -In regard to chronic anticoagulation with Xarelto, would recommend holding vs. Continuation at the discretion of primary team.    -Clearance pending at present with CT head pending performance.     · Acute renal insufficiency:  -Stop Losartan/HCTZ combo.   -Repeat AM BMP given pending CT head with and without contrast.      • Severe depression:  -ECT per primary team.   -Sertraline and Wellbutrin XL.     • Persistent atrial fibrillation:  -Home Xarelto on board.   -Continue Coreg 25mg BID.   -Cardiology input reviewed.     • Chronic anticoagulation with Xarelto:  -20mg Xarelto qd on board.   -[id number] at least 2.     · Essential Hypertension:  -Continue Norvasc 10mg qd.   -Continue Coreg 25mg BID.   -Stop Losartan/HCTZ combo pill 2/2 renal insufficiency today.     · Hypertriglyceridemia:  -Recommend starting fish oil on discharge, we do not have on formulary to start inpatient.         Thank you for the opportunity to participate in the care of your patient. Please do not hesitate to call with any questions or concerns.     Christiana Holland PA-C  [facility] Medicine Team  03/02/21  15:28 EST  Pager # [phone]  ---------------------------------------------------------------------------------------------------------------------

## 2021-03-03 ENCOUNTER — APPOINTMENT (OUTPATIENT)
Dept: CT IMAGING | Facility: HOSPITAL | Age: 71
End: 2021-03-03

## 2021-03-03 LAB
ANION GAP SERPL CALCULATED.3IONS-SCNC: 11.5 MMOL/L (ref 5–15)
BASOPHILS # BLD AUTO: 0.06 10*3/MM3 (ref 0–0.2)
BASOPHILS NFR BLD AUTO: 0.7 % (ref 0–1.5)
BUN SERPL-MCNC: 19 MG/DL (ref 8–23)
BUN/CREAT SERPL: 16.4 (ref 7–25)
CALCIUM SPEC-SCNC: 10.3 MG/DL (ref 8.6–10.5)
CHLORIDE SERPL-SCNC: 100 MMOL/L (ref 98–107)
CO2 SERPL-SCNC: 24.5 MMOL/L (ref 22–29)
CREAT SERPL-MCNC: 1.16 MG/DL (ref 0.76–1.27)
DEPRECATED RDW RBC AUTO: 47.3 FL (ref 37–54)
EOSINOPHIL # BLD AUTO: 0.1 10*3/MM3 (ref 0–0.4)
EOSINOPHIL NFR BLD AUTO: 1.2 % (ref 0.3–6.2)
ERYTHROCYTE [DISTWIDTH] IN BLOOD BY AUTOMATED COUNT: 14.1 % (ref 12.3–15.4)
GFR SERPL CREATININE-BSD FRML MDRD: 63 ML/MIN/1.73
GLUCOSE BLDC GLUCOMTR-MCNC: 117 MG/DL (ref 70–130)
GLUCOSE BLDC GLUCOMTR-MCNC: 118 MG/DL (ref 70–130)
GLUCOSE SERPL-MCNC: 112 MG/DL (ref 65–99)
HCT VFR BLD AUTO: 45.8 % (ref 37.5–51)
HGB BLD-MCNC: 15.7 G/DL (ref 13–17.7)
IMM GRANULOCYTES # BLD AUTO: 0.03 10*3/MM3 (ref 0–0.05)
IMM GRANULOCYTES NFR BLD AUTO: 0.4 % (ref 0–0.5)
LYMPHOCYTES # BLD AUTO: 1.75 10*3/MM3 (ref 0.7–3.1)
LYMPHOCYTES NFR BLD AUTO: 20.7 % (ref 19.6–45.3)
MCH RBC QN AUTO: 31.7 PG (ref 26.6–33)
MCHC RBC AUTO-ENTMCNC: 34.3 G/DL (ref 31.5–35.7)
MCV RBC AUTO: 92.3 FL (ref 79–97)
MONOCYTES # BLD AUTO: 0.96 10*3/MM3 (ref 0.1–0.9)
MONOCYTES NFR BLD AUTO: 11.4 % (ref 5–12)
NEUTROPHILS NFR BLD AUTO: 5.55 10*3/MM3 (ref 1.7–7)
NEUTROPHILS NFR BLD AUTO: 65.6 % (ref 42.7–76)
NRBC BLD AUTO-RTO: 0 /100 WBC (ref 0–0.2)
PLATELET # BLD AUTO: 336 10*3/MM3 (ref 140–450)
PMV BLD AUTO: 9.9 FL (ref 6–12)
POTASSIUM SERPL-SCNC: 3.9 MMOL/L (ref 3.5–5.2)
QT INTERVAL: 350 MS
QTC INTERVAL: 446 MS
RBC # BLD AUTO: 4.96 10*6/MM3 (ref 4.14–5.8)
SODIUM SERPL-SCNC: 136 MMOL/L (ref 136–145)
WBC # BLD AUTO: 8.45 10*3/MM3 (ref 3.4–10.8)

## 2021-03-03 PROCEDURE — 71250 CT THORAX DX C-: CPT | Performed by: RADIOLOGY

## 2021-03-03 PROCEDURE — 71250 CT THORAX DX C-: CPT

## 2021-03-03 PROCEDURE — 70470 CT HEAD/BRAIN W/O & W/DYE: CPT | Performed by: RADIOLOGY

## 2021-03-03 PROCEDURE — 85025 COMPLETE CBC W/AUTO DIFF WBC: CPT | Performed by: PHYSICIAN ASSISTANT

## 2021-03-03 PROCEDURE — 99233 SBSQ HOSP IP/OBS HIGH 50: CPT | Performed by: PSYCHIATRY & NEUROLOGY

## 2021-03-03 PROCEDURE — 70470 CT HEAD/BRAIN W/O & W/DYE: CPT

## 2021-03-03 PROCEDURE — 99221 1ST HOSP IP/OBS SF/LOW 40: CPT | Performed by: INTERNAL MEDICINE

## 2021-03-03 PROCEDURE — 80048 BASIC METABOLIC PNL TOTAL CA: CPT | Performed by: PHYSICIAN ASSISTANT

## 2021-03-03 PROCEDURE — 99233 SBSQ HOSP IP/OBS HIGH 50: CPT | Performed by: PHYSICIAN ASSISTANT

## 2021-03-03 PROCEDURE — 82962 GLUCOSE BLOOD TEST: CPT

## 2021-03-03 PROCEDURE — 93010 ELECTROCARDIOGRAM REPORT: CPT | Performed by: INTERNAL MEDICINE

## 2021-03-03 PROCEDURE — 93005 ELECTROCARDIOGRAM TRACING: CPT | Performed by: PSYCHIATRY & NEUROLOGY

## 2021-03-03 PROCEDURE — 25010000002 IOPAMIDOL 61 % SOLUTION: Performed by: PSYCHIATRY & NEUROLOGY

## 2021-03-03 RX ORDER — POLYETHYLENE GLYCOL 3350 17 G/17G
17 POWDER, FOR SOLUTION ORAL DAILY
Status: DISCONTINUED | OUTPATIENT
Start: 2021-03-03 | End: 2021-03-19 | Stop reason: HOSPADM

## 2021-03-03 RX ADMIN — ALPRAZOLAM 0.5 MG: 0.5 TABLET ORAL at 14:06

## 2021-03-03 RX ADMIN — CARVEDILOL 25 MG: 25 TABLET, FILM COATED ORAL at 09:35

## 2021-03-03 RX ADMIN — MAGNESIUM HYDROXIDE 10 ML: 2400 SUSPENSION ORAL at 09:38

## 2021-03-03 RX ADMIN — ASPIRIN 81 MG: 81 TABLET, COATED ORAL at 09:36

## 2021-03-03 RX ADMIN — SERTRALINE 100 MG: 50 TABLET, FILM COATED ORAL at 09:38

## 2021-03-03 RX ADMIN — IBUPROFEN 400 MG: 400 TABLET, FILM COATED ORAL at 20:45

## 2021-03-03 RX ADMIN — POLYETHYLENE GLYCOL (3350) 17 G: 17 POWDER, FOR SOLUTION ORAL at 14:06

## 2021-03-03 RX ADMIN — Medication 5 MG: at 20:45

## 2021-03-03 RX ADMIN — PANTOPRAZOLE SODIUM 40 MG: 40 TABLET, DELAYED RELEASE ORAL at 17:56

## 2021-03-03 RX ADMIN — RIVAROXABAN 20 MG: 20 TABLET, FILM COATED ORAL at 09:36

## 2021-03-03 RX ADMIN — ALBUTEROL SULFATE 2 PUFF: 90 AEROSOL, METERED RESPIRATORY (INHALATION) at 09:36

## 2021-03-03 RX ADMIN — CARVEDILOL 25 MG: 25 TABLET, FILM COATED ORAL at 17:56

## 2021-03-03 RX ADMIN — IOPAMIDOL 80 ML: 612 INJECTION, SOLUTION INTRAVENOUS at 10:30

## 2021-03-03 RX ADMIN — ALBUTEROL SULFATE 2 PUFF: 90 AEROSOL, METERED RESPIRATORY (INHALATION) at 20:45

## 2021-03-03 RX ADMIN — ALBUTEROL SULFATE 2 PUFF: 90 AEROSOL, METERED RESPIRATORY (INHALATION) at 17:05

## 2021-03-03 RX ADMIN — AMLODIPINE BESYLATE 10 MG: 10 TABLET ORAL at 09:36

## 2021-03-03 RX ADMIN — BUPROPION HYDROCHLORIDE 300 MG: 150 TABLET, FILM COATED, EXTENDED RELEASE ORAL at 09:38

## 2021-03-03 NOTE — PROGRESS NOTES
Primary hospitalist: Dr. De Leon    Reason for Consultation:       Chief complaint: Depression    History of present illness:   70-year-old gentleman with a known history of medical afibrillation, depression, pyloric ulcer and COVID-19 infection is admitted to inpatient psychiatry.  He was admitted to [facility] on 2/27/2021 after presenting to ER on 2/26/2021 for mental health evaluation and report of worsening of severe depression for last 2 months.  He reported that he was diagnosed with COVID-19 in December 2020 and had to be hospitalized from January 1 through January 18 with worsening depression following isolation.  Pulmonary medicine team is consulted for abnormal CT findings.    Review of Systems:    General: Negative for fatigue or fever  Psychological: Negative for anxiety or depression  Ophthalmic: Negative for blurry vision or double vision  ENT: Negative for epistaxis or hearing changes  Allergy and immunology: Negative for hives or nasal congestion  Hematological and lymphatic: Negative for jaundice or night sweats  Endocrine: Negative for lethargy, temperature intolerance  Respiratory: Negative for shortness of breath.  Negative for cough.  Negative for wheezing.  Cardiovascular: Negative for chest pain.  Negative for dyspnea on exertion  Gastrointestinal: No abdominal pain, no change in bowel habits  Musculoskeletal: Negative for joint swelling and joint pain  Neurological: No TIA or stroke symptoms  Dermatological: Negative for acne or eczema      Past medical history, past surgical history, social history and family history:  •  has a past medical history of A-fib (CMS/[facility]), A-fib (CMS/[facility]), Anxiety, Depression, Helicobacter pylori (H. pylori) infection, Hernia of abdominal cavity, HTN (hypertension), and Pyloric ulcer.  •  has a past surgical history that includes Toe Nail Avulsion; Esophagogastroduodenoscopy; Electroconvulsive therapy (Bilateral, 6/14/2018); Electroconvulsive therapy (N/A,  "6/15/2018); and Electroconvulsive therapy (N/A, 6/19/2018).  •  reports that he has never smoked. He has never used smokeless tobacco. He reports that he does not drink alcohol or use drugs.  • family history includes Dementia in his sister.     Medication and allergies:  • Prior to admission  Current Outpatient Medications   Medication Instructions   • albuterol sulfate  (90 Base) MCG/ACT inhaler 2 puffs, Inhalation, 3 Times Daily   • ALPRAZolam (XANAX) 0.5 mg, Oral, 2 Times Daily PRN, Prior to [facility] Admission, Patient was on: <BR>Xanax 0.5mg Bid x 7 days from [place]   • amLODIPine (NORVASC) 10 mg, Oral, Every 24 Hours Scheduled   • aspirin 81 mg, Oral, Daily   • carvedilol (COREG) 25 mg, Oral, 2 Times Daily With Meals   • FLUoxetine (PROZAC) 20 mg, Oral, Every Morning   • irbesartan-hydrochlorothiazide (AVALIDE) 150-12.5 MG tablet 2 tablets, Oral, Every Morning   • pantoprazole (PROTONIX) 40 mg, Oral, Every Evening   • rivaroxaban (XARELTO) 20 mg, Oral, Daily        • Active medication:  albuterol sulfate HFA, 2 puff, Inhalation, TID  amLODIPine, 10 mg, Oral, Q24H  aspirin, 81 mg, Oral, Daily  buPROPion XL, 300 mg, Oral, Daily  carvedilol, 25 mg, Oral, BID With Meals  melatonin, 5 mg, Oral, Nightly  pantoprazole, 40 mg, Oral, Q PM  polyethylene glycol, 17 g, Oral, Daily  rivaroxaban, 20 mg, Oral, Daily  [START ON 3/4/2021] sertraline, 150 mg, Oral, Daily        • Continuous Infusions:        Allergies:    Patient has no known allergies.      Vital Signs    height is 172.7 cm (68\") and weight is 92.9 kg (204 lb 12.8 oz). His temporal temperature is 97.3 °F (36.3 °C). His blood pressure is 111/64 and his pulse is 62. His respiration is 18 and oxygen saturation is 95%.        Physical Exam:  General Appearance:  In no acute distress and comfortable.    HEENT: Normocephalic, atraumatic, normal right and the left external ear.  Normal nose.  Lungs:  Normal effort and normal respiratory rate.  Negative for " rales.  Negative for wheezes.  Negative for rhonchi.    Heart: Irregularly irregular rhythm.  S1 normal and S2 normal.    Abdomen: Abdomen is soft.  Bowel sounds are normal.   There is no abdominal tenderness.     Extremities: Normal range of motion.  Negative for dependent edema    Pulses: Distal pulses are intact.  There are no decreased pulses.    Neurological: Patient is alert and oriented to person, place and time.  Normal strength.    Pupils:  Pupils are equal, round, and reactive to light.    Skin:  Warm and dry.        Pertinent past medical history includes A. fib, depression, pyloric ulcer, history of COVID-19 infection.  Never smoker.    Pertinent lab: No leukocytosis.  ANC normal.  Absolute lymphocyte count normal.      CT scan of the chest report:  1. 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  2. Pleural-based nodule in the right upper lobe. Recommend  stratification based on patient's risk factors and follow-up.  3. Ground-glass opacities in the lungs could represent residua if the  patient had previous Covid pneumonia.  4. Other findings as above.    Assessment:  • Fluid-filled density along the fissure  • Pleural-based nodule in the right upper lobe  • Groundglass opacities likely the residual changes in the lung due to Covid 19 infection    Plan:  1. Follow-up CT scan of the chest in 6 months to reevaluate the pulmonary parenchyma as an outpatient.  2. Titrate FiO2 to keep respiratory on 90%  3. Recommend walk oximetry for discharge to assess supplemental oxygen need on exertion.    We will sign off.  Please call us in case of any other questions.      Thank you for involving me in the care of the patient.  Alejo Yoon M.D  Pulmonary and Critical Care Medicine

## 2021-03-03 NOTE — PROGRESS NOTES
Patient Identification:  Name:  Ernesto Easley  Age:  70 y.o.  Sex:  male  :  1950  MRN:  [id number]  Visit Number:  [id number]  Primary Care Provider:  Kervin Prince APRN    Length of stay:  5    Subjective/Interval History/Consultants/Procedures     Chief complaint: Follow-up ECT clearance, abnormal chest x-ray    Subjective:      Mr. Easley is resting in a chair in the day room waiting to see Psychiatry.  He reports he is doing well. He denies chest pain, dyspnea, cough, and wheeze.  He reports he is okay, but does not elaborate.        ----------------------------------------------------------------------------------------------------------------------  Current Hospital Meds:  albuterol sulfate HFA, 2 puff, Inhalation, TID  amLODIPine, 10 mg, Oral, Q24H  aspirin, 81 mg, Oral, Daily  buPROPion XL, 300 mg, Oral, Daily  carvedilol, 25 mg, Oral, BID With Meals  melatonin, 5 mg, Oral, Nightly  pantoprazole, 40 mg, Oral, Q PM  polyethylene glycol, 17 g, Oral, Daily  rivaroxaban, 20 mg, Oral, Daily  [START ON 3/4/2021] sertraline, 150 mg, Oral, Daily         ----------------------------------------------------------------------------------------------------------------------      Objective     Vital Signs:  Temp:  [97.3 °F (36.3 °C)-97.9 °F (36.6 °C)] 97.3 °F (36.3 °C)  Heart Rate:  [62-93] 62  Resp:  [18] 18  BP: (103-121)/(64-80) 111/64      21  1855   Weight: 92.9 kg (204 lb 12.8 oz)     Body mass index is 31.14 kg/m².  No intake or output data in the 24 hours ending 21 1217  No intake/output data recorded.  Diet Regular; Low Fat  ----------------------------------------------------------------------------------------------------------------------    Physical Exam  Vitals signs and nursing note reviewed.   Constitutional:       Appearance: Normal appearance. He is well-developed and well-groomed.   HENT:      Head: Normocephalic and atraumatic.   Eyes:      General: Lids are normal.       Conjunctiva/sclera:      Right eye: Right conjunctiva is not injected.      Left eye: Left conjunctiva is not injected.   Cardiovascular:      Rate and Rhythm: Normal rate and regular rhythm.      Heart sounds: S1 normal and S2 normal.   Pulmonary:      Effort: No tachypnea or bradypnea.      Breath sounds: No decreased breath sounds, wheezing, rhonchi or rales.   Abdominal:      General: Bowel sounds are normal.      Palpations: Abdomen is soft.      Tenderness: There is no abdominal tenderness.   Musculoskeletal:      Right lower leg: No edema.      Left lower leg: No edema.   Skin:     General: Skin is warm and dry.   Neurological:      Mental Status: He is alert and oriented to person, place, and time.   Psychiatric:         Attention and Perception: Attention normal.         Mood and Affect: Mood is depressed. Affect is not angry or tearful.         Speech: Speech is not rapid and pressured.         Behavior: Behavior is withdrawn.                  ----------------------------------------------------------------------------------------------------------------------  Tele:      EKG previously reviewed.   ----------------------------------------------------------------------------------------------------------------------      Results from last 7 days   Lab Units 03/03/21  0722 02/26/21  1649   WBC 10*3/mm3 8.45 9.23   HEMOGLOBIN g/dL 15.7 14.4   HEMATOCRIT % 45.8 42.1   MCV fL 92.3 93.8   MCHC g/dL 34.3 34.2   PLATELETS 10*3/mm3 336 295         Results from last 7 days   Lab Units 03/03/21  0722 03/02/21  1410 02/26/21  1649   SODIUM mmol/L 136 138 135*   POTASSIUM mmol/L 3.9 4.2 3.9   MAGNESIUM mg/dL  --  2.0 1.8   CHLORIDE mmol/L 100 101 100   CO2 mmol/L 24.5 26.3 26.5   BUN mg/dL 19 18 15   CREATININE mg/dL 1.16 1.29* 1.06   EGFR IF NONAFRICN AM mL/min/1.73 63 55* 69   CALCIUM mg/dL 10.3 10.5 9.6   GLUCOSE mg/dL 112* 124* 108*   ALBUMIN g/dL  --   --  4.06   BILIRUBIN mg/dL  --   --  0.5   ALK PHOS U/L  --    --  94   AST (SGOT) U/L  --   --  16   ALT (SGPT) U/L  --   --  19   Estimated Creatinine Clearance: 67.4 mL/min (by C-G formula based on SCr of 1.16 mg/dL).  No results found for: AMMONIA  Results from last 7 days   Lab Units 02/27/21  0711   CHOLESTEROL mg/dL 162   TRIGLYCERIDES mg/dL 328*   HDL CHOL mg/dL 28*   LDL CHOL mg/dL 80     No results found for: BLOODCX  No results found for: URINECX  No results found for: WOUNDCX  No results found for: STOOLCX  ----------------------------------------------------------------------------------------------------------------------  Imaging Results (Last 24 Hours)     Procedure Component Value Units Date/Time    CT Head With & Without Contrast [[id number]] Collected: 03/03/21 0926     Updated: 03/03/21 0959    Narrative:      CT HEAD WITH AND WITHOUT CONTRAST-     CLINICAL INDICATION: ECT clearance        COMPARISON: 06/07/2018      TECHNIQUE: Axial images of the brain were obtained with out and then  after intravenous contrast.  Reformatted images were created in the  sagittal and coronal planes.     DOSE: 1121.70 mGy.cm     Radiation dose reduction techniques were utilized per ALARA protocol.  Automated exposure control was initiated through either or [employer] [place] or  [employer] software packages by  protocol.           FINDINGS:   Today's study shows no mass, hemorrhage, or midline shift.   The ventricles, cisterns, and sulci are unremarkable. There is no  hydrocephalus.   Following contrast, there is no abnormal enhancement.  I do not see epidural or subdural hematoma.  The gray-white differentiation is appropriate.   The bone window setting images show no destructive calvarial lesion or  acute calvarial fracture.   The posterior fossa is unremarkable.          Impression:         1. No parenchymal mass, hemorrhage, or midline shift.  2. No contrast-enhancing abnormalities.     This report was finalized on 3/3/2021 9:57 AM by Dr. Randolph Ma MD.       CT Chest  Without Contrast Diagnostic [[id number]] Collected: 03/03/21 0926     Updated: 03/03/21 0959    Narrative:      EXAM: CT CHEST WITHOUT CONTRAST DIAGNOSTIC-      CLINICAL INDICATION:Abnormal xray - lung nodule, >= 1 cm      COMPARISON: NONE.     TECHNIQUE: Multiple axial CT images were obtained from lung apex through  upper abdomen without the administration of IV contrast. Reformatted  images in the coronal and/or sagittal plane(s) were generated from the  axial data set to facilitate diagnostic accuracy and/or surgical  planning.     Radiation dose reduction techniques were utilized per ALARA protocol.  Automated exposure control was initiated through either or [employer] or  [employer] software packages by  protocol.    DOSE (DLP mGy-cm): 745.53 mGy.cm        FINDINGS:     LUNGS: The area on recent plain radiography corresponds to a density in  the right lung on image 39 of the axial series that measures 3.16 cm. It  is adjacent to the fissure and has density suggestive of fluid.  Also nodule anteriorly on image 43 of the axial series measuring 4.4 mm.  No other parenchymal nodules.  There is minimal ground-glass opacification in the lungs which could  represent residua from previous Covid pneumonia.     HEART: Coronary artery calcifications.     MEDIASTINUM: No masses. No enlarged lymph nodes.  No fluid collections.     PLEURA: No pleural effusion. No pleural mass or abnormal calcification.  No pneumothorax.     VASCULATURE: No evidence of aneurysm.     BONES: No acute bony abnormality.     VISUALIZED UPPER ABDOMEN:The upper abdomen is unremarkable as  visualized.     Other: None.       Impression:         1. 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  2. Pleural-based nodule in the right upper lobe. Recommend  stratification based on patient's risk factors and follow-up.  3. Ground-glass opacities in the lungs  could represent residua if the  patient had previous Covid pneumonia.  4. Other findings as above.           This report was finalized on 3/3/2021 9:57 AM by Dr. Randolph Ma MD.       XR Chest PA & Lateral [[id number]] Collected: 03/02/21 1306     Updated: 03/02/21 1414    Narrative:      EXAMINATION: XR CHEST PA AND LATERAL-      CLINICAL INDICATION: Pre-ECT clearance        COMPARISON: 06/07/2018      TECHNIQUE: XR CHEST PA AND LATERAL-      FINDINGS:   LUNGS: Area of increased density in the right lung. Recommend CT of the  chest.      HEART AND MEDIASTINUM: Heart and mediastinal contours are unremarkable        SKELETON: Bony and soft tissue structures are unremarkable.             Impression:      Area of increased density in the right lung. Recommend CT of the chest.     This report was finalized on 3/2/2021 2:12 PM by Dr. Randolph Ma MD.               ----------------------------------------------------------------------------------------------------------------------   I have reviewed the above laboratory values for 03/03/21    Assessment/Plan     Active Hospital Problems    Diagnosis POA   • **Severe episode of recurrent major depressive disorder, without psychotic features (CMS/HCC) [F33.2] Yes     Added automatically from request for surgery [id number]           ASSESSMENT/PLAN:  · Pre-ECT clearance evaluation:   -CT head without acute abnormality.   -CBC & BMP from 2/26 reviewed.  -BMP from 3/2 reviewed upon return.    -EKG from 02/28 reviewed with improved QTc.   -History of tolerating ECT procedure with review of record with dramatic improvement with subsequent treatments on June 14 and June 19 with reported resolution of depression at said time.   -In regard to chronic anticoagulation with Xarelto, would recommend holding vs. Continuation at the discretion of primary team.    -Per review of CT head and CT chest with labs, Mr. Easley is likely an acceptable candidate for ECT, though would defer  continuing vs. Holding chronic anticoagulation to primary team given increased risk of intracranial bleed.      · Abnormal CXR with right lung area of increased density felt to be pseudotumor fluid density:   -CT chest ordered with fluid density along the fissure in the right lung probably pseudotumor corresponding to plain radiographic abnormality  -Further outpatient follow-up with recommendation of outpatient follow-up with Pulmonology and/or PCP for further evaluation and follow-up imaging.     · Pleural based RUL lung nodule:  -Further outpatient follow-up with PCP for further imaging given recent COVID-19.     · Acute renal insufficiency:  -Stop Losartan/HCTZ combo.   -Repeat AM BMP improved this AM.  -Repeat AM BMP given IV contrast received on 3/3/21.       · Severe depression:  -ECT per primary team.   -Sertraline and Wellbutrin XL.      · Persistent atrial fibrillation:  -Home Xarelto on board.   -Continue Coreg 25mg BID.   -Cardiology input reviewed.      · Chronic anticoagulation with Xarelto:  -20mg Xarelto qd on board.   -[id number] at least 2.      · Essential Hypertension:  -Continue Norvasc 10mg qd.   -Continue Coreg 25mg BID.   -Stop Losartan/HCTZ combo pill 2/2 renal insufficiency today.      · Hypertriglyceridemia:  -Recommend starting fish oil on discharge, we do not have on formulary to start inpatient.        Christiana Holland PA-C  03/03/21  12:17 EST  Pager # [phone]

## 2021-03-03 NOTE — PLAN OF CARE
Goal Outcome Evaluation:  Plan of Care Reviewed With: patient  Progress: no change  Outcome Summary: Patient rates Anx 10 Dep 10 Denies SI, HI, or AVH states he doesn't want to hurt himself or anyone else. Reports a good appetite while sleeping 6 hrs routine

## 2021-03-03 NOTE — PROGRESS NOTES
"INPATIENT PSYCHIATRIC PROGRESS NOTE    Name:  Ernesto Easley  :  1950  MRN:  [id number]  Visit Number:  [id number]  Length of stay:  5    Behavioral Health Treatment Plan and Problem List: I have reviewed and approved the Behavioral Health Treatment Plan and Problem list.    SUBJECTIVE    CC/ Focus of exam: depression.     Patient's subjective status: \"Not in a good\"      INTERVAL HISTORY: Patient remains severely depressed with a sense of hopelessness, no real change since yesterday.  Concerning findings with the chest PA and lateral/CT, have requested pulmonary consult.  Clearance for ECT pending.  Patient continues on one-on-one special precautions.    Discussed the situation with his wife, the patient's status, medical issues, and treatment plan.  Postponing any debate regarding the ECT for now.  Patient is now on therapeutic levels of both sertraline and bupropion.  Depression rating 10/10  Anxiety rating in/10  Sleep: Poor         Review of Systems   Respiratory: Negative.    Cardiovascular: Negative.    Gastrointestinal: Negative.    Neurological: Negative.          OBJECTIVE    Temp:  [97.3 °F (36.3 °C)-97.9 °F (36.6 °C)] 97.3 °F (36.3 °C)  Heart Rate:  [62-93] 62  Resp:  [18] 18  BP: (103-121)/(64-80) 111/64    MENTAL STATUS EXAM:        Psychomotor: No psychomotor agitation/retardation, No EPS, No motor tics  Speech-normal rate, amount.  Mood/Affect:  Depressed  Thought Processes:  though blocking  Thought Content:  dilussional, negativistic and mood congruent  Hallucination(s): auditory  Hopelessness: Yes  Optimistic:No  Suicidal Thoughts:   Did not offer to answer.   Suicidal Plan/Intent:  Did not offer to answer.   Homicidal Thoughts:  absent  Orientation: oriented x 3  Memory: recent intact    Lab Results (last 24 hours)     Procedure Component Value Units Date/Time    Basic Metabolic Panel [[id number]]  (Abnormal) Collected: 21    Specimen: Blood Updated: 21 0832     Glucose 112 " mg/dL      BUN 19 mg/dL      Creatinine 1.16 mg/dL      Sodium 136 mmol/L      Potassium 3.9 mmol/L      Comment: Slight hemolysis detected by analyzer. Results may be affected.        Chloride 100 mmol/L      CO2 24.5 mmol/L      Calcium 10.3 mg/dL      eGFR Non African Amer 63 mL/min/1.73      BUN/Creatinine Ratio 16.4     Anion Gap 11.5 mmol/L     Narrative:      GFR Normal >60  Chronic Kidney Disease <60  Kidney Failure <15      CBC & Differential [[id number]]  (Abnormal) Collected: 03/03/21 0722    Specimen: Blood Updated: 03/03/21 0812    Narrative:      The following orders were created for panel order CBC & Differential.  Procedure                               Abnormality         Status                     ---------                               -----------         ------                     CBC Auto Differential[[id number]]        Abnormal            Final result                 Please view results for these tests on the individual orders.    CBC Auto Differential [[id number]]  (Abnormal) Collected: 03/03/21 0722    Specimen: Blood Updated: 03/03/21 0812     WBC 8.45 10*3/mm3      RBC 4.96 10*6/mm3      Hemoglobin 15.7 g/dL      Hematocrit 45.8 %      MCV 92.3 fL      MCH 31.7 pg      MCHC 34.3 g/dL      RDW 14.1 %      RDW-SD 47.3 fl      MPV 9.9 fL      Platelets 336 10*3/mm3      Neutrophil % 65.6 %      Lymphocyte % 20.7 %      Monocyte % 11.4 %      Eosinophil % 1.2 %      Basophil % 0.7 %      Immature Grans % 0.4 %      Neutrophils, Absolute 5.55 10*3/mm3      Lymphocytes, Absolute 1.75 10*3/mm3      Monocytes, Absolute 0.96 10*3/mm3      Eosinophils, Absolute 0.10 10*3/mm3      Basophils, Absolute 0.06 10*3/mm3      Immature Grans, Absolute 0.03 10*3/mm3      nRBC 0.0 /100 WBC     POC Glucose Once [[id number]]  (Normal) Collected: 03/03/21 0736    Specimen: Blood Updated: 03/03/21 0758     Glucose 117 mg/dL     Basic Metabolic Panel [[id number]]  (Abnormal) Collected: 03/02/21 1410    Specimen: Blood  Updated: 03/02/21 1455     Glucose 124 mg/dL      BUN 18 mg/dL      Creatinine 1.29 mg/dL      Sodium 138 mmol/L      Potassium 4.2 mmol/L      Chloride 101 mmol/L      CO2 26.3 mmol/L      Calcium 10.5 mg/dL      eGFR Non African Amer 55 mL/min/1.73      BUN/Creatinine Ratio 14.0     Anion Gap 10.7 mmol/L     Narrative:      GFR Normal >60  Chronic Kidney Disease <60  Kidney Failure <15      Magnesium [[id number]]  (Normal) Collected: 03/02/21 1410    Specimen: Blood Updated: 03/02/21 1455     Magnesium 2.0 mg/dL            Imaging Results (Last 24 Hours)     Procedure Component Value Units Date/Time    CT Head With & Without Contrast [[id number]] Collected: 03/03/21 0926     Updated: 03/03/21 0959    Narrative:      CT HEAD WITH AND WITHOUT CONTRAST-     CLINICAL INDICATION: ECT clearance        COMPARISON: 06/07/2018      TECHNIQUE: Axial images of the brain were obtained with out and then  after intravenous contrast.  Reformatted images were created in the  sagittal and coronal planes.     DOSE: 1121.70 mGy.cm     Radiation dose reduction techniques were utilized per ALARA protocol.  Automated exposure control was initiated through either or [employer] or  [employer] software packages by  protocol.           FINDINGS:   Today's study shows no mass, hemorrhage, or midline shift.   The ventricles, cisterns, and sulci are unremarkable. There is no  hydrocephalus.   Following contrast, there is no abnormal enhancement.  I do not see epidural or subdural hematoma.  The gray-white differentiation is appropriate.   The bone window setting images show no destructive calvarial lesion or  acute calvarial fracture.   The posterior fossa is unremarkable.          Impression:         1. No parenchymal mass, hemorrhage, or midline shift.  2. No contrast-enhancing abnormalities.     This report was finalized on 3/3/2021 9:57 AM by Dr. Randolph Ma MD.       CT Chest Without Contrast Diagnostic [[id number]] Collected:  03/03/21 0926     Updated: 03/03/21 0959    Narrative:      EXAM: CT CHEST WITHOUT CONTRAST DIAGNOSTIC-      CLINICAL INDICATION:Abnormal xray - lung nodule, >= 1 cm      COMPARISON: NONE.     TECHNIQUE: Multiple axial CT images were obtained from lung apex through  upper abdomen without the administration of IV contrast. Reformatted  images in the coronal and/or sagittal plane(s) were generated from the  axial data set to facilitate diagnostic accuracy and/or surgical  planning.     Radiation dose reduction techniques were utilized per ALARA protocol.  Automated exposure control was initiated through either or Dr. Tariff or  [employer] software packages by  protocol.    DOSE (DLP mGy-cm): 745.53 mGy.cm        FINDINGS:     LUNGS: The area on recent plain radiography corresponds to a density in  the right lung on image 39 of the axial series that measures 3.16 cm. It  is adjacent to the fissure and has density suggestive of fluid.  Also nodule anteriorly on image 43 of the axial series measuring 4.4 mm.  No other parenchymal nodules.  There is minimal ground-glass opacification in the lungs which could  represent residua from previous Covid pneumonia.     HEART: Coronary artery calcifications.     MEDIASTINUM: No masses. No enlarged lymph nodes.  No fluid collections.     PLEURA: No pleural effusion. No pleural mass or abnormal calcification.  No pneumothorax.     VASCULATURE: No evidence of aneurysm.     BONES: No acute bony abnormality.     VISUALIZED UPPER ABDOMEN:The upper abdomen is unremarkable as  visualized.     Other: None.       Impression:         1. 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  2. Pleural-based nodule in the right upper lobe. Recommend  stratification based on patient's risk factors and follow-up.  3. Ground-glass opacities in the lungs could represent residua if the  patient had previous  Covid pneumonia.  4. Other findings as above.           This report was finalized on 3/3/2021 9:57 AM by Dr. Randolph Ma MD.       XR Chest PA & Lateral [[id number]] Collected: 03/02/21 1306     Updated: 03/02/21 1414    Narrative:      EXAMINATION: XR CHEST PA AND LATERAL-      CLINICAL INDICATION: Pre-ECT clearance        COMPARISON: 06/07/2018      TECHNIQUE: XR CHEST PA AND LATERAL-      FINDINGS:   LUNGS: Area of increased density in the right lung. Recommend CT of the  chest.      HEART AND MEDIASTINUM: Heart and mediastinal contours are unremarkable        SKELETON: Bony and soft tissue structures are unremarkable.             Impression:      Area of increased density in the right lung. Recommend CT of the chest.     This report was finalized on 3/2/2021 2:12 PM by Dr. Randolph Ma MD.              ECG/EMG Results (most recent)     Procedure Component Value Units Date/Time    Adult Transthoracic Echo Complete W/ Cont if Necessary Per Protocol [[id number]] Collected: 02/27/21 1308     Updated: 02/27/21 1335     BSA 2.1 m^2      IVSd 0.64 cm      LVIDd 4.4 cm      LVIDs 2.1 cm      LVPWd 0.84 cm      IVS/LVPW 0.77     FS 52.9 %      EDV(Teich) 86.3 ml      ESV(Teich) 13.7 ml      EF(Teich) 84.1 %      EDV(cubed) 83.5 ml      ESV(cubed) 8.7 ml      EF(cubed) 89.5 %      LV mass(C)d 97.9 grams      LV mass(C)dI 47.5 grams/m^2      SV(Teich) 72.6 ml      SI(Teich) 35.2 ml/m^2      SV(cubed) 74.7 ml      SI(cubed) 36.2 ml/m^2      Ao root diam 2.9 cm      Ao root area 6.4 cm^2      ACS 2.0 cm      LA dimension 3.8 cm      LA/Ao 1.3     LVOT diam 2.2 cm      LVOT area 3.8 cm^2      LVOT area(traced) 3.8 cm^2      LVLd ap4 7.1 cm      EDV(MOD-sp4) 66.8 ml      LVLs ap4 6.4 cm      ESV(MOD-sp4) 21.9 ml      EF(MOD-sp4) 67.2 %      SV(MOD-sp4) 44.9 ml      SI(MOD-sp4) 21.8 ml/m^2      Ao root area (BSA corrected) 1.4     LV Thompson Vol (BSA corrected) 32.4 ml/m^2      LV Sys Vol (BSA corrected) 10.6 ml/m^2      MV E max  gabe 101.0 cm/sec      MV A max gabe 77.1 cm/sec      MV E/A 1.3     Ao pk gabe 103.0 cm/sec      Ao max PG 4.2 mmHg      Ao V2 mean 83.8 cm/sec      Ao mean PG 3.0 mmHg      Ao V2 VTI 18.0 cm      SV(Ao) 114.8 ml      SI(Ao) 55.7 ml/m^2      PA acc time 0.11 sec      TR max gabe 213.0 cm/sec      RVSP(TR) 28.1 mmHg      RAP systole 10.0 mmHg      PA pr(Accel) 31.3 mmHg       CV ECHO KEVON - BZI_BMI 31.0 kilograms/m^2       CV ECHO KEVON - BSA(HAYCOCK) 2.1 m^2       CV ECHO KEVON - BZI_METRIC_WEIGHT 92.5 kg       CV ECHO KEVON - BZI_METRIC_HEIGHT 172.7 cm      Target HR (85%) 129 bpm      Max. Pred. HR (100%) 152 bpm     Narrative:      · Left ventricular ejection fraction appears to be 51 - 55%. Left   ventricular systolic function is normal.  · Left ventricular diastolic function was indeterminate.  · No significant functional valvular abnormalities noted  · There is no evidence of pericardial effusion       ECG 12 Lead [[id number]] Collected: 02/27/21 0153     Updated: 02/27/21 1617     QT Interval 432 ms      QTC Interval 522 ms     Narrative:      Test Reason : Baseline Cardiac Status  Blood Pressure : **/** mmHG  Vent. Rate : 088 BPM     Atrial Rate : 141 BPM     P-R Int : 000 ms          QRS Dur : 102 ms      QT Int : 432 ms       P-R-T Axes : 000 049 -81 degrees     QTc Int : 522 ms    Atrial fibrillation  Nonspecific ST and T wave abnormality  Prolonged QT  Abnormal ECG  When compared with ECG of 26-FEB-2021 18:33, (Unconfirmed)  QT has lengthened  Confirmed by Christopher Aguilar ([id number]) on 2/27/2021 4:17:17 PM    Referred By:             Confirmed By:Christopher Aguilar    ECG 12 Lead [[id number]] Collected: 02/28/21 0721     Updated: 03/03/21 0920     QT Interval 350 ms      QTC Interval 446 ms     Narrative:      Test Reason : qtc monitoring  Blood Pressure : **/** mmHG  Vent. Rate : 098 BPM     Atrial Rate : 441 BPM     P-R Int : 000 ms          QRS Dur : 098 ms      QT Int : 350 ms       P-R-T Axes :  000 046 -82 degrees     QTc Int : 446 ms    Atrial fibrillation  ST & T wave abnormality, consider inferior ischemia  Abnormal ECG  When compared with ECG of 27-FEB-2021 01:53,  QT has shortened  Confirmed by Gabino Napier ([id number]) on 3/3/2021 9:19:51 AM    Referred By:  BAUDILIO           Confirmed By:Gabino Napier           ALLERGIES: Patient has no known allergies.      Current Facility-Administered Medications:   •  acetaminophen (TYLENOL) tablet 650 mg, 650 mg, Oral, Q6H PRN, Luis Miguel Gallo MD  •  albuterol sulfate HFA (PROVENTIL HFA;VENTOLIN HFA;PROAIR HFA) inhaler 2 puff, 2 puff, Inhalation, TID, Luis Miguel Gallo MD, 2 puff at 03/03/21 0936  •  ALPRAZolam (XANAX) tablet 0.5 mg, 0.5 mg, Oral, BID PRN, Luis Miguel Gallo MD  •  aluminum-magnesium hydroxide-simethicone (MAALOX MAX) 400-400-40 MG/5ML suspension 15 mL, 15 mL, Oral, Q6H PRN, Luis Miguel Gallo MD  •  amLODIPine (NORVASC) tablet 10 mg, 10 mg, Oral, Q24H, Christiana Holland PA-C, 10 mg at 03/03/21 0936  •  aspirin EC tablet 81 mg, 81 mg, Oral, Daily, Luis Miguel Gallo MD, 81 mg at 03/03/21 0936  •  benzonatate (TESSALON) capsule 100 mg, 100 mg, Oral, TID PRN, Luis Miguel Gallo MD  •  bisacodyl (DULCOLAX) EC tablet 5 mg, 5 mg, Oral, Daily PRN, Luis Miguel Gallo MD  •  buPROPion XL (WELLBUTRIN XL) 24 hr tablet 300 mg, 300 mg, Oral, Daily, Thomas De Leon MD, 300 mg at 03/03/21 0938  •  carvedilol (COREG) tablet 25 mg, 25 mg, Oral, BID With Meals, Christiana Holland PA-C, 25 mg at 03/03/21 0935  •  ibuprofen (ADVIL,MOTRIN) tablet 400 mg, 400 mg, Oral, Q6H PRN, Luis Miguel Gallo MD  •  loperamide (IMODIUM) capsule 2 mg, 2 mg, Oral, Q2H PRN, Luis Miguel Glalo MD  •  magnesium hydroxide (MILK OF MAGNESIA) suspension 2400 mg/10mL 10 mL, 10 mL, Oral, Daily PRN, Luis Miguel Gallo MD, 10 mL at 03/03/21 0938  •  melatonin tablet 3 mg, 3 mg, Oral, Nightly PRN, Luis Miguel Gallo MD  •  melatonin tablet 5 mg, 5 mg, Oral, Nightly, Luis Miguel Gallo MD, 5 mg at  03/02/21 2152  •  ondansetron (ZOFRAN) tablet 4 mg, 4 mg, Oral, Q6H PRN, Luis Miguel Gallo MD  •  pantoprazole (PROTONIX) EC tablet 40 mg, 40 mg, Oral, Q PM, Luis Miguel Gallo MD, 40 mg at 03/02/21 1622  •  rivaroxaban (XARELTO) tablet 20 mg, 20 mg, Oral, Daily, Luis Miguel Gallo MD, 20 mg at 03/03/21 0936  •  sertraline (ZOLOFT) tablet 100 mg, 100 mg, Oral, Daily, Thomas De Leon MD, 100 mg at 03/03/21 0938  •  sodium chloride nasal spray 2 spray, 2 spray, Each Nare, PRN, Luis Miguel Gallo MD    ASSESSMENT & PLAN    Major Depressive Disorder with psychosis, recurrent  -Depression appears to have worsened since patient suffered from COVID this past January, compounded by JUAN ANTONIO [place] diagnosis this past week  - Sertraline.  - Wellbutrin  mg daily  - melatonin 5 mg nightly  - Patient received ECT in the past and may be a good candidate for treatment again  -Continue outpatient care     ANaya [place]  -Recent diagnosis  -Continue Xarelto and aspirin     Prolonged QTC  -Improved for 48  -Appreciate cardiology's involvement   Selection of antidepressant medications finding least offensive agents in regards to prolonged QTC.  Sertraline and bupropion.    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     Special precautions: Special Precautions Level 3 (q15 min checks)     Behavioral Health Treatment Plan and Problem List: I have reviewed and approved the Behavioral Health Treatment Plan and Problem list.    I spent a total of 42 minutes in direct patient care including  30 minutes face to face with the patient assessment, coordination of care, and counseling the patient on the current and follow-up treatment plans regarding his status. Also discussed his status, ongoing medical issues, and the treatment plan with his wife.  Answered patient questions.    JEAN PAUL  Lniwood De Leon MD    Clinician:  Thomas De Leon MD  03/03/21  12:00 EST    Dictated utilizing Dragon dictation

## 2021-03-03 NOTE — NURSING NOTE
Contacted GLO Marsh for pulmonology consult. She states that she will put the patient on her rotation and come see him.

## 2021-03-03 NOTE — PLAN OF CARE
Goal Outcome Evaluation:  Plan of Care Reviewed With: patient  Progress: improving  Outcome Summary: Patient denies SI, HI, and A/V/H. Patient remains on SP2 precautions and has sitter at bedside. Patient received CT of head and chest today. Received follow up pulmonary consult with chest CT. Patient is isolative to room, but cooperative with staff. He reports 7/10 anxiety and 7/10 depression. Patient had collaborative meeting with MD and wife today and discussed treatment plan. Will continue to monitor patient according to protocol.

## 2021-03-03 NOTE — NURSING NOTE
Pt. Through out shift has had a worried  facial expression he verbalizes  @ times that he knows something will happen and the rate its  going it going happen soon encouraged him to talk about feelings he verbalizes no one will believe him discussed  when ever he wants to talk we will listen REASSURANCE GIVEN HE IS SAFE HERE .

## 2021-03-03 NOTE — PLAN OF CARE
Goal Outcome Evaluation:  Plan of Care Reviewed With: patient  Progress: improving  Outcome Summary: Therapist met with Patient in the office to discuss treatment progress and disposition plan; Patient agreeable.    Problem: Adult Behavioral Health Plan of Care  Goal: Plan of Care Review  Outcome: Ongoing, Progressing  Flowsheets (Taken 3/3/2021 0956)  Progress: improving  Plan of Care Reviewed With: patient  Patient Agreement with Plan of Care: agrees  Outcome Summary:   Therapist met with Patient in the office to discuss treatment progress and disposition plan   Patient agreeable.  Goal: Optimized Coping Skills in Response to Life Stressors  Outcome: Ongoing, Progressing  Intervention: Promote Effective Coping Strategies  Flowsheets (Taken 3/3/2021 0956)  Supportive Measures:   active listening utilized   decision-making supported   positive reinforcement provided   verbalization of feelings encouraged  Goal: Develops/Participates in Therapeutic [place] to Support Successful Transition  Outcome: Ongoing, Progressing  Intervention: Foster Therapeutic [place]  Flowsheets (Taken 3/3/2021 0956)  Trust Relationship/Rapport:   care explained   questions encouraged   reassurance provided   choices provided   emotional support provided   thoughts/feelings acknowledged   empathic listening provided   questions answered  Intervention: Mutually Develop Transition Plan  Flowsheets (Taken 3/3/2021 0956)  Transition Support:   community resources reviewed   follow-up care coordinated   follow-up care discussed   crisis management plan promoted   crisis management plan verbalized    0957:    DATA: Therapist met with Patient individually this date. Patient agreeable to discuss current treatment progress and discharge concerns.     Therapist met with Patient in the office.    CLINICAL MANUVERING/INTERVENTIONS:  Assisted Patient in processing session content; acknowledged and normalized Patient’s thoughts, feelings, and concerns  "by utilizing a person-centered approach in efforts to build appropriate rapport and a positive therapeutic relationship with open and honest communication. Allowed Patient to ventilate regarding current stressors and triggers for negative emotions and thoughts in a safe nonjudgmental environment with unconditional positive regard, active listening skills, and empathy.    ASSESSMENT: Patient was seen today for a follow up. Patient remains down and depressed. He is very guarded and states he does not really know why he is feeling this way. He continues to report that everything is good at home and that his spouse is very supportive. Patient reports the only thing he knows to correlate it with his having COVID in December. He reports he is not feeling any better than he was upon admission. When asked about specific negative thoughts he replies \"I don't know, I have different ones\" but denies the thought to actually harm himself. We discussed ECT and Patient is highly considering it. He reports it helped in the past.     PLAN: Patient will continue stabilization. Patient will continue to receive services offered by Treatment Team.     Patient will follow-up with the [facility].     Assistance with transportation will not be needed. Family member will provide.   "

## 2021-03-03 NOTE — NURSING NOTE
Patient received ECG, the findings were A-fib with rapid ventricular response. ST &T wave abnormality, consider inferolateral ischemia. The patient denies chest pain. His vital signs were bp: 100/70, hr: 94, T: 97.9, RR: 17.  called and findings were reported. He states that the patient has history of a-fib and no new orders are required at this time. Sitter is present at patient's bedside. Will continue to monitor according to protocol.

## 2021-03-04 LAB
ANION GAP SERPL CALCULATED.3IONS-SCNC: 12.1 MMOL/L (ref 5–15)
BUN SERPL-MCNC: 27 MG/DL (ref 8–23)
BUN/CREAT SERPL: 19.1 (ref 7–25)
CALCIUM SPEC-SCNC: 10 MG/DL (ref 8.6–10.5)
CHLORIDE SERPL-SCNC: 100 MMOL/L (ref 98–107)
CO2 SERPL-SCNC: 25.9 MMOL/L (ref 22–29)
CREAT SERPL-MCNC: 1.41 MG/DL (ref 0.76–1.27)
GFR SERPL CREATININE-BSD FRML MDRD: 50 ML/MIN/1.73
GLUCOSE SERPL-MCNC: 120 MG/DL (ref 65–99)
POTASSIUM SERPL-SCNC: 4.1 MMOL/L (ref 3.5–5.2)
QT INTERVAL: 362 MS
QTC INTERVAL: 471 MS
SODIUM SERPL-SCNC: 138 MMOL/L (ref 136–145)

## 2021-03-04 PROCEDURE — 99232 SBSQ HOSP IP/OBS MODERATE 35: CPT | Performed by: PSYCHIATRY & NEUROLOGY

## 2021-03-04 PROCEDURE — 80048 BASIC METABOLIC PNL TOTAL CA: CPT | Performed by: PHYSICIAN ASSISTANT

## 2021-03-04 RX ADMIN — CARVEDILOL 25 MG: 25 TABLET, FILM COATED ORAL at 09:30

## 2021-03-04 RX ADMIN — POLYETHYLENE GLYCOL (3350) 17 G: 17 POWDER, FOR SOLUTION ORAL at 09:33

## 2021-03-04 RX ADMIN — SERTRALINE 150 MG: 50 TABLET, FILM COATED ORAL at 09:36

## 2021-03-04 RX ADMIN — PANTOPRAZOLE SODIUM 40 MG: 40 TABLET, DELAYED RELEASE ORAL at 18:07

## 2021-03-04 RX ADMIN — ALBUTEROL SULFATE 2 PUFF: 90 AEROSOL, METERED RESPIRATORY (INHALATION) at 17:15

## 2021-03-04 RX ADMIN — ALPRAZOLAM 0.5 MG: 0.5 TABLET ORAL at 12:08

## 2021-03-04 RX ADMIN — ALBUTEROL SULFATE 2 PUFF: 90 AEROSOL, METERED RESPIRATORY (INHALATION) at 09:30

## 2021-03-04 RX ADMIN — BUPROPION HYDROCHLORIDE 300 MG: 150 TABLET, FILM COATED, EXTENDED RELEASE ORAL at 09:35

## 2021-03-04 RX ADMIN — ASPIRIN 81 MG: 81 TABLET, COATED ORAL at 09:33

## 2021-03-04 RX ADMIN — AMLODIPINE BESYLATE 10 MG: 10 TABLET ORAL at 09:32

## 2021-03-04 RX ADMIN — CARVEDILOL 25 MG: 25 TABLET, FILM COATED ORAL at 17:29

## 2021-03-04 RX ADMIN — RIVAROXABAN 20 MG: 20 TABLET, FILM COATED ORAL at 09:33

## 2021-03-04 NOTE — PROGRESS NOTES
"INPATIENT PSYCHIATRIC PROGRESS NOTE    Name:  Ernesto Easley  :  1950  MRN:  [id number]  Visit Number:  [id number]  Length of stay:  6    Behavioral Health Treatment Plan and Problem List: I have reviewed and approved the Behavioral Health Treatment Plan and Problem list.    SUBJECTIVE    CC/ Focus of exam: depression    Patient's subjective status:\"Nothing is going to help\"    INTERVAL HISTORY: remains seriously depressed, with a sense of hopelessness worthlessness and thoughts that he would be better off dead.  When asked about suicidal ideation he states \"there is no way to do it in here\".  He is eating sparingly, is taking his oral medications which are now at therapeutic doses, both the bupropion and sertraline.  Unable to gain his consent for ECT but will go ahead with requesting ECT clearance from the medical service to make certain that is a legitimate option given his medical issues.    Depression rating 10/10  Anxiety rating 10/10  Sleep: 4 hours         Review of Systems   Respiratory: Negative.    Cardiovascular: Negative.    Gastrointestinal: Negative.    Neurological: Negative.          OBJECTIVE    Temp:  [97.5 °F (36.4 °C)-98.1 °F (36.7 °C)] 97.5 °F (36.4 °C)  Heart Rate:  [] 101  Resp:  [18] 18  BP: (110-118)/(70-81) 118/81    MENTAL STATUS EXAM:    Psychomotor: No psychomotor agitation/retardation, No EPS, No motor tics  Speech-normal rate, amount.  Mood/Affect:  Depressed  Thought Processes:  slow with thought blocking  Thought Content:  dilussional, negativistic and mood congruent  Hallucination(s): auditory, that your treatment won't help.   Hopelessness: Yes  Optimistic:No  Suicidal Thoughts:   Yes  Suicidal Plan/Intent:  No  Homicidal Thoughts:  absent  Orientation: oriented x 3  Memory: recent intact    Lab Results (last 24 hours)     Procedure Component Value Units Date/Time    Basic Metabolic Panel [[id number]]  (Abnormal) Collected: 21    Specimen: Blood Updated: " 03/04/21 0502     Glucose 120 mg/dL      BUN 27 mg/dL      Creatinine 1.41 mg/dL      Sodium 138 mmol/L      Potassium 4.1 mmol/L      Comment: Slight hemolysis detected by analyzer. Results may be affected.        Chloride 100 mmol/L      CO2 25.9 mmol/L      Calcium 10.0 mg/dL      eGFR Non African Amer 50 mL/min/1.73      BUN/Creatinine Ratio 19.1     Anion Gap 12.1 mmol/L     Narrative:      GFR Normal >60  Chronic Kidney Disease <60  Kidney Failure <15      POC Glucose Once [[id number]]  (Normal) Collected: 03/03/21 1559    Specimen: Blood Updated: 03/03/21 1609     Glucose 118 mg/dL            Imaging Results (Last 24 Hours)     ** No results found for the last 24 hours. **           ECG/EMG Results (most recent)     Procedure Component Value Units Date/Time    Adult Transthoracic Echo Complete W/ Cont if Necessary Per Protocol [[id number]] Collected: 02/27/21 1308     Updated: 02/27/21 1335     BSA 2.1 m^2      IVSd 0.64 cm      LVIDd 4.4 cm      LVIDs 2.1 cm      LVPWd 0.84 cm      IVS/LVPW 0.77     FS 52.9 %      EDV(Teich) 86.3 ml      ESV(Teich) 13.7 ml      EF(Teich) 84.1 %      EDV(cubed) 83.5 ml      ESV(cubed) 8.7 ml      EF(cubed) 89.5 %      LV mass(C)d 97.9 grams      LV mass(C)dI 47.5 grams/m^2      SV(Teich) 72.6 ml      SI(Teich) 35.2 ml/m^2      SV(cubed) 74.7 ml      SI(cubed) 36.2 ml/m^2      Ao root diam 2.9 cm      Ao root area 6.4 cm^2      ACS 2.0 cm      LA dimension 3.8 cm      LA/Ao 1.3     LVOT diam 2.2 cm      LVOT area 3.8 cm^2      LVOT area(traced) 3.8 cm^2      LVLd ap4 7.1 cm      EDV(MOD-sp4) 66.8 ml      LVLs ap4 6.4 cm      ESV(MOD-sp4) 21.9 ml      EF(MOD-sp4) 67.2 %      SV(MOD-sp4) 44.9 ml      SI(MOD-sp4) 21.8 ml/m^2      Ao root area (BSA corrected) 1.4     LV Thompson Vol (BSA corrected) 32.4 ml/m^2      LV Sys Vol (BSA corrected) 10.6 ml/m^2      MV E max gabe 101.0 cm/sec      MV A max gabe 77.1 cm/sec      MV E/A 1.3     Ao pk gabe 103.0 cm/sec      Ao max PG 4.2 mmHg      Ao  V2 mean 83.8 cm/sec      Ao mean PG 3.0 mmHg      Ao V2 VTI 18.0 cm      SV(Ao) 114.8 ml      SI(Ao) 55.7 ml/m^2      PA acc time 0.11 sec      TR max gabe 213.0 cm/sec      RVSP(TR) 28.1 mmHg      RAP systole 10.0 mmHg      PA pr(Accel) 31.3 mmHg       CV ECHO KEVON - BZI_BMI 31.0 kilograms/m^2       CV ECHO KEVON - BSA(HAYCOCK) 2.1 m^2       CV ECHO KEVON - BZI_METRIC_WEIGHT 92.5 kg       CV ECHO KEVON - BZI_METRIC_HEIGHT 172.7 cm      Target HR (85%) 129 bpm      Max. Pred. HR (100%) 152 bpm     Narrative:      · Left ventricular ejection fraction appears to be 51 - 55%. Left   ventricular systolic function is normal.  · Left ventricular diastolic function was indeterminate.  · No significant functional valvular abnormalities noted  · There is no evidence of pericardial effusion       ECG 12 Lead [[id number]] Collected: 02/27/21 0153     Updated: 02/27/21 1617     QT Interval 432 ms      QTC Interval 522 ms     Narrative:      Test Reason : Baseline Cardiac Status  Blood Pressure : **/** mmHG  Vent. Rate : 088 BPM     Atrial Rate : 141 BPM     P-R Int : 000 ms          QRS Dur : 102 ms      QT Int : 432 ms       P-R-T Axes : 000 049 -81 degrees     QTc Int : 522 ms    Atrial fibrillation  Nonspecific ST and T wave abnormality  Prolonged QT  Abnormal ECG  When compared with ECG of 26-FEB-2021 18:33, (Unconfirmed)  QT has lengthened  Confirmed by Christopher Aguilar ([id number]) on 2/27/2021 4:17:17 PM    Referred By:             Confirmed By:Christopher Aguilar    ECG 12 Lead [[id number]] Collected: 02/28/21 0721     Updated: 03/03/21 0920     QT Interval 350 ms      QTC Interval 446 ms     Narrative:      Test Reason : qtc monitoring  Blood Pressure : **/** mmHG  Vent. Rate : 098 BPM     Atrial Rate : 441 BPM     P-R Int : 000 ms          QRS Dur : 098 ms      QT Int : 350 ms       P-R-T Axes : 000 046 -82 degrees     QTc Int : 446 ms    Atrial fibrillation  ST & T wave abnormality, consider inferior  ischemia  Abnormal ECG  When compared with ECG of 27-FEB-2021 01:53,  QT has shortened  Confirmed by Gabino Napier ([id number]) on 3/3/2021 9:19:51 AM    Referred By:  BAUDILIO           Confirmed By:Gabino Napier    ECG 12 Lead [[id number]] Collected: 03/03/21 1539     Updated: 03/03/21 1543     QT Interval 362 ms      QTC Interval 471 ms     Narrative:      Test Reason : Follow-up monitoring QTC  Blood Pressure : **/** mmHG  Vent. Rate : 102 BPM     Atrial Rate : 093 BPM     P-R Int : 000 ms          QRS Dur : 086 ms      QT Int : 362 ms       P-R-T Axes : 000 050 267 degrees     QTc Int : 471 ms    Atrial fibrillation with rapid ventricular response  ST & T wave abnormality, consider inferolateral ischemia  Abnormal ECG  When compared with ECG of 28-FEB-2021 07:21,  Inverted T waves have replaced nonspecific T wave abnormality in Lateral  leads    Referred By:  CYRUS           Confirmed By:            ALLERGIES: Patient has no known allergies.      Current Facility-Administered Medications:   •  acetaminophen (TYLENOL) tablet 650 mg, 650 mg, Oral, Q6H PRN, Luis Miguel Gallo MD  •  albuterol sulfate HFA (PROVENTIL HFA;VENTOLIN HFA;PROAIR HFA) inhaler 2 puff, 2 puff, Inhalation, TID, Luis Miguel Gallo MD, 2 puff at 03/04/21 0930  •  ALPRAZolam (XANAX) tablet 0.5 mg, 0.5 mg, Oral, BID PRN, Luis Miguel Gallo MD, 0.5 mg at 03/03/21 1406  •  aluminum-magnesium hydroxide-simethicone (MAALOX MAX) 400-400-40 MG/5ML suspension 15 mL, 15 mL, Oral, Q6H PRN, Luis Miguel Gallo MD  •  amLODIPine (NORVASC) tablet 10 mg, 10 mg, Oral, Q24H, Christiana Holland PA-C, 10 mg at 03/04/21 0932  •  aspirin EC tablet 81 mg, 81 mg, Oral, Daily, Luis Miguel Gallo MD, 81 mg at 03/04/21 0933  •  benzonatate (TESSALON) capsule 100 mg, 100 mg, Oral, TID PRN, Luis Miguel Gallo MD  •  bisacodyl (DULCOLAX) EC tablet 5 mg, 5 mg, Oral, Daily PRN, Luis Miguel Gallo MD  •  buPROPion XL (WELLBUTRIN XL) 24 hr tablet 300 mg, 300 mg, Oral, Daily, Thomas De Leon  MD [place], 300 mg at 03/04/21 0935  •  carvedilol (COREG) tablet 25 mg, 25 mg, Oral, BID With Meals, Christiana Holland PA-C, 25 mg at 03/04/21 0930  •  ibuprofen (ADVIL,MOTRIN) tablet 400 mg, 400 mg, Oral, Q6H PRN, Luis Miguel Gallo MD, 400 mg at 03/03/21 2045  •  loperamide (IMODIUM) capsule 2 mg, 2 mg, Oral, Q2H PRN, Luis Miguel Gallo MD  •  magnesium hydroxide (MILK OF MAGNESIA) suspension 2400 mg/10mL 10 mL, 10 mL, Oral, Daily PRN, Luis Miguel Gallo MD, 10 mL at 03/03/21 0938  •  melatonin tablet 3 mg, 3 mg, Oral, Nightly PRN, Luis Miguel Gallo MD  •  melatonin tablet 5 mg, 5 mg, Oral, Nightly, Luis Miguel Gallo MD, 5 mg at 03/03/21 2045  •  ondansetron (ZOFRAN) tablet 4 mg, 4 mg, Oral, Q6H PRN, Lusi Miguel Gallo MD  •  pantoprazole (PROTONIX) EC tablet 40 mg, 40 mg, Oral, Q PM, Luis Miguel Gallo MD, 40 mg at 03/03/21 1756  •  polyethylene glycol (MIRALAX) packet 17 g, 17 g, Oral, Daily, Thomas De Leon MD, 17 g at 03/04/21 0933  •  rivaroxaban (XARELTO) tablet 20 mg, 20 mg, Oral, Daily, Luis Miguel Gallo MD, 20 mg at 03/04/21 0933  •  sertraline (ZOLOFT) tablet 150 mg, 150 mg, Oral, Daily, Thomas De Leon MD, 150 mg at 03/04/21 0936  •  sodium chloride nasal spray 2 spray, 2 spray, Each Nare, PRN, Luis Miguel Gallo MD    ASSESSMENT & PLAN    Major Depressive Disorder with psychosis, recurrent    -Depression appears to have worsened since patient suffered from COVID this past January, compounded by A. fib diagnosis this past week  - Sertraline 150 mg daily .  - Wellbutrin  mg daily  - melatonin 5 mg nightly  - Patient received ECT in the past and may be a good candidate for treatment again       A. Fib  -Recent diagnosis  -Continue Xarelto and aspirin     Prolonged QTC  -Improved for 48  -Appreciate cardiology's involvement   Selection of antidepressant medications finding least offensive agents in regards to prolonged QTC.  Sertraline and bupropion.     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       Special precautions: Special Precautions Level 3 (q15 min checks)     Behavioral Health Treatment Plan and Problem List: I have reviewed and approved the Behavioral Health Treatment Plan and Problem list.    I spent a total of 30 minutes in direct patient care including  20 minutes face to face with the patient assessment, coordination of care, and counseling the patient on the current and follow-up treatment plans regarding his status and treatment options.     JEAN PAUL De Leon MD    Clinician:  Thomas De Leon MD  03/04/21  11:52 EST    Dictated utilizing Dragon dictation

## 2021-03-04 NOTE — PLAN OF CARE
Goal Outcome Evaluation:  Plan of Care Reviewed With: patient  Progress: improving  Outcome Summary: Patient rated Anx 10 and Dep 10 Denied SI, HI, or AVH during this shift. Encouraged patient to talk about anything tried to find something he enjoyed discussing. Talked to wife asked her what he liked to drink and eat in an attempt to increase patient's input. Got him a orange gaterade in which he drank half of bottle. Encouraged wife to talk positively with patient to uplift spirits. He talked on phone for a while sitting in day room several hours before going to bed. His sleep was good once he layed down. He still isolated from peers with little conversation. Educated how input could help with costipation and lack of urination which her said was discussed with MD

## 2021-03-04 NOTE — PLAN OF CARE
Goal Outcome Evaluation:  Plan of Care Reviewed With: patient  Progress: improving  Outcome Summary: Therapist met with Patient to discuss treatment progress and disposition plan; Patient agreeable.    Problem: Adult Behavioral Health Plan of Care  Goal: Plan of Care Review  Outcome: Ongoing, Progressing  Flowsheets (Taken 3/4/2021 1023)  Progress: improving  Plan of Care Reviewed With: patient  Patient Agreement with Plan of Care: agrees  Outcome Summary:  • Therapist met with Patient to discuss treatment progress and disposition plan  • Patient agreeable.  Goal: Optimized Coping Skills in Response to Life Stressors  Outcome: Ongoing, Progressing  Intervention: Promote Effective Coping Strategies  Flowsheets (Taken 3/4/2021 1023)  Supportive Measures:  • active listening utilized  • decision-making supported  • positive reinforcement provided  • verbalization of feelings encouraged  Goal: Develops/Participates in Therapeutic [place] to Support Successful Transition  Outcome: Ongoing, Progressing  Intervention: Foster Therapeutic [place]  Flowsheets (Taken 3/4/2021 1023)  Trust Relationship/Rapport:  • care explained  • choices provided  • emotional support provided  • empathic listening provided  • questions answered  • questions encouraged  • reassurance provided  • thoughts/feelings acknowledged  Intervention: Mutually Develop Transition Plan  Flowsheets (Taken 3/4/2021 1023)  Transition Support:  • community resources reviewed  • follow-up care coordinated  • follow-up care discussed  • crisis management plan promoted  • crisis management plan verbalized    1023:    DATA: Therapist met with Patient individually this date. Patient agreeable to discuss current treatment progress and discharge concerns.     Therapist met with Patient at bedside.     Patient continues to have a one-on-one sitter at bedside for safety precautions.     1620:    Therapist went back and checked on Patient this evening. He is still  "severely depressed and guarded. Therapist highly encouraged him to open up and discuss what is bothering him or any thoughts he may be having. Patient eventually stated he thinks he is dying from colon cancer stating \"it is cancer, I just know it is.\" He reported he can hear the staff talking about an \"odor\" coming from him and states he know it is because he is \"rotting from the inside out.\" He states he believes this is why he has not been able to use the restroom. Therapist encouraged Patient to be eating and drinking and he reports he did eat a little lasagna for lunch today. He reports feeling afraid we are going to kill him here and that he just really wants to go home. Patient began saying I\"m sorry, I'm just really sorry.\" When asked what he is sorry for, he replies \"for causing so much trouble here.\" Therapist reassured Patient he is safe here and has not caused any trouble at all. Patient was reassured that we all are here to help him.     CLINICAL MANUVERING/INTERVENTIONS:  Assisted Patient in processing session content; acknowledged and normalized Patient’s thoughts, feelings, and concerns by utilizing a person-centered approach in efforts to build appropriate rapport and a positive therapeutic relationship with open and honest communication. Allowed Patient to ventilate regarding current stressors and triggers for negative emotions and thoughts in a safe nonjudgmental environment with unconditional positive regard, active listening skills, and empathy.     ASSESSMENT: Patient was seen today for a follow up. He remains isolated to his room and very guarded. Patient still appears severely depressed. When asked how he was feeling today he states \"not so good.\" He reports he feels like he is not getting any better. Therapist was unsuccessful in engaging Patient in conversation. Per nursing, Patient is not eating and does not want to shower.     PLAN: Patient will continue stabilization. Patient will " continue to receive services offered by Treatment Team.     Patient will follow-up with the [facility].     Assistance with transportation will not be needed. Family member will provide.

## 2021-03-04 NOTE — PLAN OF CARE
Goal Outcome Evaluation:  Plan of Care Reviewed With: patient  Progress: improving  Outcome Summary: patient has been very paranoid today and talking about probably going to die.  anxiety/depression 10,  remains with sitter.

## 2021-03-05 ENCOUNTER — ANESTHESIA EVENT (OUTPATIENT)
Dept: PERIOP | Facility: HOSPITAL | Age: 71
End: 2021-03-05

## 2021-03-05 ENCOUNTER — APPOINTMENT (OUTPATIENT)
Dept: GENERAL RADIOLOGY | Facility: HOSPITAL | Age: 71
End: 2021-03-05

## 2021-03-05 LAB
ANION GAP SERPL CALCULATED.3IONS-SCNC: 14.2 MMOL/L (ref 5–15)
BUN SERPL-MCNC: 29 MG/DL (ref 8–23)
BUN/CREAT SERPL: 24.4 (ref 7–25)
CALCIUM SPEC-SCNC: 9.9 MG/DL (ref 8.6–10.5)
CHLORIDE SERPL-SCNC: 103 MMOL/L (ref 98–107)
CO2 SERPL-SCNC: 25.8 MMOL/L (ref 22–29)
CREAT SERPL-MCNC: 1.19 MG/DL (ref 0.76–1.27)
GFR SERPL CREATININE-BSD FRML MDRD: 61 ML/MIN/1.73
GLUCOSE SERPL-MCNC: 140 MG/DL (ref 65–99)
POTASSIUM SERPL-SCNC: 4.2 MMOL/L (ref 3.5–5.2)
SODIUM SERPL-SCNC: 143 MMOL/L (ref 136–145)

## 2021-03-05 PROCEDURE — 80048 BASIC METABOLIC PNL TOTAL CA: CPT | Performed by: INTERNAL MEDICINE

## 2021-03-05 PROCEDURE — 72020 X-RAY EXAM OF SPINE 1 VIEW: CPT | Performed by: RADIOLOGY

## 2021-03-05 PROCEDURE — 99233 SBSQ HOSP IP/OBS HIGH 50: CPT | Performed by: PSYCHIATRY & NEUROLOGY

## 2021-03-05 PROCEDURE — 72020 X-RAY EXAM OF SPINE 1 VIEW: CPT

## 2021-03-05 RX ADMIN — AMLODIPINE BESYLATE 10 MG: 10 TABLET ORAL at 08:42

## 2021-03-05 RX ADMIN — Medication 5 MG: at 21:12

## 2021-03-05 RX ADMIN — POLYETHYLENE GLYCOL (3350) 17 G: 17 POWDER, FOR SOLUTION ORAL at 08:41

## 2021-03-05 RX ADMIN — ASPIRIN 81 MG: 81 TABLET, COATED ORAL at 08:42

## 2021-03-05 RX ADMIN — BUPROPION HYDROCHLORIDE 300 MG: 150 TABLET, FILM COATED, EXTENDED RELEASE ORAL at 08:42

## 2021-03-05 RX ADMIN — CARVEDILOL 25 MG: 25 TABLET, FILM COATED ORAL at 08:41

## 2021-03-05 RX ADMIN — ALBUTEROL SULFATE 2 PUFF: 90 AEROSOL, METERED RESPIRATORY (INHALATION) at 16:53

## 2021-03-05 RX ADMIN — PANTOPRAZOLE SODIUM 40 MG: 40 TABLET, DELAYED RELEASE ORAL at 16:53

## 2021-03-05 RX ADMIN — ALBUTEROL SULFATE 2 PUFF: 90 AEROSOL, METERED RESPIRATORY (INHALATION) at 08:42

## 2021-03-05 RX ADMIN — ALBUTEROL SULFATE 2 PUFF: 90 AEROSOL, METERED RESPIRATORY (INHALATION) at 21:13

## 2021-03-05 RX ADMIN — SERTRALINE 150 MG: 50 TABLET, FILM COATED ORAL at 08:41

## 2021-03-05 RX ADMIN — CARVEDILOL 25 MG: 25 TABLET, FILM COATED ORAL at 17:19

## 2021-03-05 RX ADMIN — RIVAROXABAN 20 MG: 20 TABLET, FILM COATED ORAL at 08:41

## 2021-03-05 NOTE — NURSING NOTE
Mtg with patients wife Gwendolyn and son Ernesto.  Patient had very little conversation with family.  Wife has agreed for ECT Monday.  Patient and family educated regarding procedure and they verbalized understanding and stated he has had them before and did good.

## 2021-03-05 NOTE — PLAN OF CARE
Goal Outcome Evaluation:  Plan of Care Reviewed With: patient  Progress: no change  Outcome Summary: Pt reports poor sleep and poor appetite. Rates A/D 10/10. Reports SI but denies plan. Denies HI. Reports A/D hallucinations. Reports feeling helpless, hopeless and worthless. Pt has not slept any throughout the shift. Pt is quiet and withdrawn.

## 2021-03-05 NOTE — PLAN OF CARE
Goal Outcome Evaluation:  Plan of Care Reviewed With: patient  Progress: improving  Outcome Summary: Therapist met with Patient to discuss treatment progress; Patient agreeable.    Problem: Adult Behavioral Health Plan of Care  Goal: Plan of Care Review  Outcome: Ongoing, Progressing  Flowsheets (Taken 3/5/2021 1335)  Progress: improving  Plan of Care Reviewed With: patient  Patient Agreement with Plan of Care: agrees  Outcome Summary:   Therapist met with Patient to discuss treatment progress   Patient agreeable.  Goal: Optimized Coping Skills in Response to Life Stressors  Outcome: Ongoing, Progressing  Intervention: Promote Effective Coping Strategies  Flowsheets (Taken 3/5/2021 1335)  Supportive Measures:   active listening utilized   decision-making supported   positive reinforcement provided   verbalization of feelings encouraged  Goal: Develops/Participates in Therapeutic [place] to Support Successful Transition  Outcome: Ongoing, Progressing  Intervention: Foster Therapeutic [place]  Flowsheets (Taken 3/5/2021 1335)  Trust Relationship/Rapport:   care explained   questions encouraged   choices provided   reassurance provided   emotional support provided   thoughts/feelings acknowledged   empathic listening provided   questions answered  Intervention: Mutually Develop Transition Plan  Flowsheets (Taken 3/5/2021 1335)  Transition Support:   community resources reviewed   follow-up care coordinated   crisis management plan promoted   follow-up care discussed   crisis management plan verbalized    1335:    DATA: Therapist met with Patient individually this date. Patient agreeable to discuss current treatment progress and discharge concerns.     Patient still has a one-on-one sitter for safety precautions.    Therapist staffed case with nursing. RN met with Patient, his spouse and son earlier today. All consents have been signed for ECT and Patient will receive his first treatment on Monday, 3/8.     CLINICAL  "MANUVERING/INTERVENTIONS:  Assisted Patient in processing session content; acknowledged and normalized Patient’s thoughts, feelings, and concerns by utilizing a person-centered approach in efforts to build appropriate rapport and a positive therapeutic relationship with open and honest communication. Allowed Patient to ventilate regarding current stressors and triggers for negative emotions and thoughts in a safe nonjudgmental environment with unconditional positive regard, active listening skills, and empathy.     ASSESSMENT: Patient was seen today for a follow up. He was observed to be laying in bed resting. He reports he is feeling \"no good\" today with no improvement from previous days. We discussed ECT and he appears hesitant, but is hopeful it will be successful. Patient remains very anxious and is worried he is \"dying from cancer.\" Therapist encouraged Patient to keep eating and drinking.    PLAN: Patient will continue stabilization. Patient will continue to receive services offered by Treatment Team.     Patient will [facility] with the [facility].    Assistance with transportation will not be needed. Family member will provide.  "

## 2021-03-05 NOTE — PROGRESS NOTES
"INPATIENT PSYCHIATRIC PROGRESS NOTE    Name:  Ernesto Easley  :  1950  MRN:  [id number]  Visit Number:  [id number]  Length of stay:  7    Behavioral Health Treatment Plan and Problem List: I have reviewed and approved the Behavioral Health Treatment Plan and Problem list.    SUBJECTIVE    CC/ Focus of exam: depression    Patient's subjective status: \"Not good\"     INTERVAL HISTORY: Patient's depression status has not improved, did not sleep last night nor is he eating and make statements to the effect that he feels he is dying of cancer and is \"running inside\".  However, patient did agree for ECT which can be started on Monday.     Discussed risk-benefit issues of ECT also with patient's wife who consented per phone conversation for the treatment to start Monday.  She and her son are to visit with the patient later today.    Discussed this patient's medical clearance with Dr. Akhtar who cleared the patient for ECT with the caveat that the decision regarding the anticoagulation and ECT was not a contraindication to this physician.  This is based on research done for a prior patient several months ago.  Should note patient is imminently at risk for hours major harm in his current state of depression including possible self inflicted injury.      Depression rating 10/10  Anxiety rating 10/10  Sleep:poor       Review of Systems   Respiratory: Negative.    Cardiovascular: Negative.    Gastrointestinal: Negative.    Neurological: Negative.          OBJECTIVE    Temp:  [97.1 °F (36.2 °C)-98.4 °F (36.9 °C)] 97.1 °F (36.2 °C)  Heart Rate:  [] 106  Resp:  [16-18] 16  BP: (101-126)/(67-81) 126/80    MENTAL STATUS EXAM:        Psychomotor: No psychomotor agitation/retardation, No EPS, No motor tics  Speech-normal rate, amount.  Mood/Affect:  Depressed  Thought Processes:  poor  Thought Content:  dilussional, negativistic and mood congruent  Hallucination(s): auditory  Hopelessness: Yes  Optimistic:No  Suicidal " Thoughts:   Yes  Suicidal Plan/Intent:  Yes  Homicidal Thoughts:  absent  Orientation: oriented x 3  Memory: recent intact    Lab Results (last 24 hours)     ** No results found for the last 24 hours. **           Imaging Results (Last 24 Hours)     ** No results found for the last 24 hours. **           ECG/EMG Results (most recent)     Procedure Component Value Units Date/Time    Adult Transthoracic Echo Complete W/ Cont if Necessary Per Protocol [[id number]] Collected: 02/27/21 1308     Updated: 02/27/21 1335     BSA 2.1 m^2      IVSd 0.64 cm      LVIDd 4.4 cm      LVIDs 2.1 cm      LVPWd 0.84 cm      IVS/LVPW 0.77     FS 52.9 %      EDV(Teich) 86.3 ml      ESV(Teich) 13.7 ml      EF(Teich) 84.1 %      EDV(cubed) 83.5 ml      ESV(cubed) 8.7 ml      EF(cubed) 89.5 %      LV mass(C)d 97.9 grams      LV mass(C)dI 47.5 grams/m^2      SV(Teich) 72.6 ml      SI(Teich) 35.2 ml/m^2      SV(cubed) 74.7 ml      SI(cubed) 36.2 ml/m^2      Ao root diam 2.9 cm      Ao root area 6.4 cm^2      ACS 2.0 cm      LA dimension 3.8 cm      LA/Ao 1.3     LVOT diam 2.2 cm      LVOT area 3.8 cm^2      LVOT area(traced) 3.8 cm^2      LVLd ap4 7.1 cm      EDV(MOD-sp4) 66.8 ml      LVLs ap4 6.4 cm      ESV(MOD-sp4) 21.9 ml      EF(MOD-sp4) 67.2 %      SV(MOD-sp4) 44.9 ml      SI(MOD-sp4) 21.8 ml/m^2      Ao root area (BSA corrected) 1.4     LV Thompson Vol (BSA corrected) 32.4 ml/m^2      LV Sys Vol (BSA corrected) 10.6 ml/m^2      MV E max gabe 101.0 cm/sec      MV A max gabe 77.1 cm/sec      MV E/A 1.3     Ao pk gabe 103.0 cm/sec      Ao max PG 4.2 mmHg      Ao V2 mean 83.8 cm/sec      Ao mean PG 3.0 mmHg      Ao V2 VTI 18.0 cm      SV(Ao) 114.8 ml      SI(Ao) 55.7 ml/m^2      PA acc time 0.11 sec      TR max gabe 213.0 cm/sec      RVSP(TR) 28.1 mmHg      RAP systole 10.0 mmHg      PA pr(Accel) 31.3 mmHg       CV ECHO KEVON - BZI_BMI 31.0 kilograms/m^2       CV ECHO KEVON - BSA(HAYCOCK) 2.1 m^2       CV ECHO KEVON - BZI_METRIC_WEIGHT 92.5 kg        CV ECHO KEVON - BZI_METRIC_HEIGHT 172.7 cm      Target HR (85%) 129 bpm      Max. Pred. HR (100%) 152 bpm     Narrative:      · Left ventricular ejection fraction appears to be 51 - 55%. Left   ventricular systolic function is normal.  · Left ventricular diastolic function was indeterminate.  · No significant functional valvular abnormalities noted  · There is no evidence of pericardial effusion       ECG 12 Lead [[id number]] Collected: 02/27/21 0153     Updated: 02/27/21 1617     QT Interval 432 ms      QTC Interval 522 ms     Narrative:      Test Reason : Baseline Cardiac Status  Blood Pressure : **/** mmHG  Vent. Rate : 088 BPM     Atrial Rate : 141 BPM     P-R Int : 000 ms          QRS Dur : 102 ms      QT Int : 432 ms       P-R-T Axes : 000 049 -81 degrees     QTc Int : 522 ms    Atrial fibrillation  Nonspecific ST and T wave abnormality  Prolonged QT  Abnormal ECG  When compared with ECG of 26-FEB-2021 18:33, (Unconfirmed)  QT has lengthened  Confirmed by Christopher Aguilar (2020) on 2/27/2021 4:17:17 PM    Referred By:             Confirmed By:Christopher Aguilar    ECG 12 Lead [[id number]] Collected: 02/28/21 0721     Updated: 03/03/21 0920     QT Interval 350 ms      QTC Interval 446 ms     Narrative:      Test Reason : qtc monitoring  Blood Pressure : **/** mmHG  Vent. Rate : 098 BPM     Atrial Rate : 441 BPM     P-R Int : 000 ms          QRS Dur : 098 ms      QT Int : 350 ms       P-R-T Axes : 000 046 -82 degrees     QTc Int : 446 ms    Atrial fibrillation  ST & T wave abnormality, consider inferior ischemia  Abnormal ECG  When compared with ECG of 27-FEB-2021 01:53,  QT has shortened  Confirmed by Gabino Napier ([id number]) on 3/3/2021 9:19:51 AM    Referred By:  BAUDILIO           Confirmed By:Gabino Napier    ECG 12 Lead [[id number]] Collected: 03/03/21 1539     Updated: 03/04/21 1822     QT Interval 362 ms      QTC Interval 471 ms     Narrative:      Test Reason : Follow-up monitoring QTC  Blood Pressure  : **/** mmHG  Vent. Rate : 102 BPM     Atrial Rate : 093 BPM     P-R Int : 000 ms          QRS Dur : 086 ms      QT Int : 362 ms       P-R-T Axes : 000 050 267 degrees     QTc Int : 471 ms    Atrial fibrillation with rapid ventricular response  ST & T wave abnormality, consider inferolateral ischemia  Abnormal ECG  When compared with ECG of 28-FEB-2021 07:21,  Inverted T waves have replaced nonspecific T wave abnormality in Lateral  leads  Confirmed by Gabino Napier ([id number]) on 3/4/2021 6:22:03 PM    Referred By:  CYRUS           Confirmed By:Gabino Napier           ALLERGIES: Patient has no known allergies.      Current Facility-Administered Medications:   •  acetaminophen (TYLENOL) tablet 650 mg, 650 mg, Oral, Q6H PRN, Luis Miguel Gallo MD  •  albuterol sulfate HFA (PROVENTIL HFA;VENTOLIN HFA;PROAIR HFA) inhaler 2 puff, 2 puff, Inhalation, TID, Luis Miguel Gallo MD, 2 puff at 03/05/21 0842  •  ALPRAZolam (XANAX) tablet 0.5 mg, 0.5 mg, Oral, BID PRN, Luis Miguel Gallo MD, 0.5 mg at 03/04/21 1208  •  aluminum-magnesium hydroxide-simethicone (MAALOX MAX) 400-400-40 MG/5ML suspension 15 mL, 15 mL, Oral, Q6H PRN, Luis Miguel Gallo MD  •  amLODIPine (NORVASC) tablet 10 mg, 10 mg, Oral, Q24H, Christiana Holland PA-C, 10 mg at 03/05/21 0842  •  aspirin EC tablet 81 mg, 81 mg, Oral, Daily, Luis Miguel Gallo MD, 81 mg at 03/05/21 0842  •  benzonatate (TESSALON) capsule 100 mg, 100 mg, Oral, TID PRN, Luis Miguel Gallo MD  •  bisacodyl (DULCOLAX) EC tablet 5 mg, 5 mg, Oral, Daily PRN, Luis Miguel Gallo MD  •  buPROPion XL (WELLBUTRIN XL) 24 hr tablet 300 mg, 300 mg, Oral, Daily, Thomas De Leon MD, 300 mg at 03/05/21 0842  •  carvedilol (COREG) tablet 25 mg, 25 mg, Oral, BID With Meals, Christiana Holland PA-C, 25 mg at 03/05/21 0841  •  ibuprofen (ADVIL,MOTRIN) tablet 400 mg, 400 mg, Oral, Q6H PRN, Luis Miguel Gallo MD, 400 mg at 03/03/21 2045  •  loperamide (IMODIUM) capsule 2 mg, 2 mg, Oral, Q2H PRN, Luis Miguel Gallo  MD LAVERNE  •  magnesium hydroxide (MILK OF MAGNESIA) suspension 2400 mg/10mL 10 mL, 10 mL, Oral, Daily PRN, Luis Miguel Gallo MD, 10 mL at 03/03/21 0938  •  melatonin tablet 3 mg, 3 mg, Oral, Nightly PRN, Luis Miguel Gallo MD  •  melatonin tablet 5 mg, 5 mg, Oral, Nightly, Luis Miguel Gallo MD, 5 mg at 03/03/21 2045  •  ondansetron (ZOFRAN) tablet 4 mg, 4 mg, Oral, Q6H PRN, Luis Miguel Gallo MD  •  pantoprazole (PROTONIX) EC tablet 40 mg, 40 mg, Oral, Q PM, Luis Miguel Gallo MD, 40 mg at 03/04/21 1807  •  polyethylene glycol (MIRALAX) packet 17 g, 17 g, Oral, Daily, Thomas De Leon MD, 17 g at 03/05/21 0841  •  rivaroxaban (XARELTO) tablet 20 mg, 20 mg, Oral, Daily, Luis Miguel Gallo MD, 20 mg at 03/05/21 0841  •  sertraline (ZOLOFT) tablet 150 mg, 150 mg, Oral, Daily, Thomas De Leon MD, 150 mg at 03/05/21 0841  •  sodium chloride nasal spray 2 spray, 2 spray, Each Nare, PRN, Luis Miguel Gallo MD    ASSESSMENT & PLAN    Major Depressive Disorder with psychosis, recurrent     -Depression appears to have worsened since patient suffered from COVID this past January, compounded by A. fib diagnosis this past week  - Sertraline 150 mg daily .  - Wellbutrin  mg daily  - melatonin 5 mg nightly  -ECT starting Monday 3/8      A. Fib  -Recent diagnosis  -Continue Xarelto and aspirin     Prolonged QTC  -Improved for 48  -Appreciate cardiology's involvement   Selection of antidepressant medications finding least offensive agents in regards to prolonged QTC.  Sertraline and bupropion.     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       Special precautions: Special Precautions Level 3 (q15 min checks)     Behavioral Health Treatment Plan and Problem List: I have reviewed and approved the Behavioral Health Treatment Plan and Problem list.    I  spent a total of 42 minutes in direct patient care including  30 minutes face to face with the patient assessment, coordination of care, and counseling the patient on the current and follow-up treatment plans regarding his staatus. . Answered patient and his wife's questions regarding the treatment plan, specifically ECT. Advised the wife that with the patient AF and anticoagulation there would be an additional risk of an adverse events but this was difficult to quantify. She accepted this an agreed with the treatment plan.  .    JEAN PAUL De Leon MD    Clinician:  Thomas De Leon MD  03/05/21  09:18 EST    Dictated utilizing Dragon dictation

## 2021-03-05 NOTE — PLAN OF CARE
Goal Outcome Evaluation:  Plan of Care Reviewed With: patient  Progress: improving  Outcome Summary: PATIENT HAS BEEN IN B ED M OST OF THE DAY BUT OUT FEW HOURS IN DAY ROOM BUT JUST KEPT TO SELF.  ATE MASHED POTATOES FOR LUNCH AND FOOD PREFERENCE ORDERED FOR SUPPER PER WIFES SUGGESTION.  FAMILY MTG TODAY AND PATIENT AGREED TP HAVE ECT'S.  REMAINS SP1, VERY WITHDRAWN AND STAFF ANTICIPATING AND MEETING PATIENTS NEEDS.

## 2021-03-06 LAB
QT INTERVAL: 364 MS
QTC INTERVAL: 440 MS

## 2021-03-06 PROCEDURE — 99232 SBSQ HOSP IP/OBS MODERATE 35: CPT | Performed by: PSYCHIATRY & NEUROLOGY

## 2021-03-06 PROCEDURE — 93010 ELECTROCARDIOGRAM REPORT: CPT | Performed by: INTERNAL MEDICINE

## 2021-03-06 PROCEDURE — 93005 ELECTROCARDIOGRAM TRACING: CPT | Performed by: PSYCHIATRY & NEUROLOGY

## 2021-03-06 RX ADMIN — SERTRALINE 150 MG: 50 TABLET, FILM COATED ORAL at 08:19

## 2021-03-06 RX ADMIN — ASPIRIN 81 MG: 81 TABLET, COATED ORAL at 08:19

## 2021-03-06 RX ADMIN — CARVEDILOL 25 MG: 25 TABLET, FILM COATED ORAL at 08:19

## 2021-03-06 RX ADMIN — POLYETHYLENE GLYCOL (3350) 17 G: 17 POWDER, FOR SOLUTION ORAL at 08:19

## 2021-03-06 RX ADMIN — AMLODIPINE BESYLATE 10 MG: 10 TABLET ORAL at 08:19

## 2021-03-06 RX ADMIN — MAGNESIUM HYDROXIDE 10 ML: 2400 SUSPENSION ORAL at 17:22

## 2021-03-06 RX ADMIN — RIVAROXABAN 20 MG: 20 TABLET, FILM COATED ORAL at 08:19

## 2021-03-06 RX ADMIN — ALBUTEROL SULFATE 2 PUFF: 90 AEROSOL, METERED RESPIRATORY (INHALATION) at 08:19

## 2021-03-06 RX ADMIN — ALBUTEROL SULFATE 2 PUFF: 90 AEROSOL, METERED RESPIRATORY (INHALATION) at 17:20

## 2021-03-06 RX ADMIN — PANTOPRAZOLE SODIUM 40 MG: 40 TABLET, DELAYED RELEASE ORAL at 17:20

## 2021-03-06 RX ADMIN — BUPROPION HYDROCHLORIDE 300 MG: 150 TABLET, FILM COATED, EXTENDED RELEASE ORAL at 08:19

## 2021-03-06 RX ADMIN — ALBUTEROL SULFATE 2 PUFF: 90 AEROSOL, METERED RESPIRATORY (INHALATION) at 20:51

## 2021-03-06 RX ADMIN — Medication 5 MG: at 20:52

## 2021-03-06 NOTE — PLAN OF CARE
Goal Outcome Evaluation:  Plan of Care Reviewed With: patient  Progress: no change  Outcome Summary: Pt reports poor sleep and poor appetite .Rates A/D10/10. Reports SI with no plan. Denies HI or hallucinations. Reports feeling helpless,hopeless and worthless.

## 2021-03-06 NOTE — PLAN OF CARE
Goal Outcome Evaluation:  Plan of Care Reviewed With: patient  Progress: improving  Outcome Summary: patient has been in room today but is eating better and taking his medications.  showered self and talked on phone with his wife today but still flat affect, denies s/i, anxiety/depression 10.

## 2021-03-07 LAB
ANION GAP SERPL CALCULATED.3IONS-SCNC: 9.7 MMOL/L (ref 5–15)
BUN SERPL-MCNC: 20 MG/DL (ref 8–23)
BUN/CREAT SERPL: 19.2 (ref 7–25)
CALCIUM SPEC-SCNC: 9.7 MG/DL (ref 8.6–10.5)
CHLORIDE SERPL-SCNC: 102 MMOL/L (ref 98–107)
CO2 SERPL-SCNC: 26.3 MMOL/L (ref 22–29)
CREAT SERPL-MCNC: 1.04 MG/DL (ref 0.76–1.27)
FLUAV RNA RESP QL NAA+PROBE: NOT DETECTED
FLUBV RNA RESP QL NAA+PROBE: NOT DETECTED
GFR SERPL CREATININE-BSD FRML MDRD: 71 ML/MIN/1.73
GLUCOSE SERPL-MCNC: 137 MG/DL (ref 65–99)
POTASSIUM SERPL-SCNC: 4.1 MMOL/L (ref 3.5–5.2)
SARS-COV-2 RNA RESP QL NAA+PROBE: NOT DETECTED
SODIUM SERPL-SCNC: 138 MMOL/L (ref 136–145)

## 2021-03-07 PROCEDURE — 87636 SARSCOV2 & INF A&B AMP PRB: CPT | Performed by: PSYCHIATRY & NEUROLOGY

## 2021-03-07 PROCEDURE — 99232 SBSQ HOSP IP/OBS MODERATE 35: CPT | Performed by: PSYCHIATRY & NEUROLOGY

## 2021-03-07 PROCEDURE — 80048 BASIC METABOLIC PNL TOTAL CA: CPT | Performed by: PSYCHIATRY & NEUROLOGY

## 2021-03-07 RX ADMIN — BUPROPION HYDROCHLORIDE 300 MG: 150 TABLET, FILM COATED, EXTENDED RELEASE ORAL at 08:20

## 2021-03-07 RX ADMIN — ALBUTEROL SULFATE 2 PUFF: 90 AEROSOL, METERED RESPIRATORY (INHALATION) at 21:23

## 2021-03-07 RX ADMIN — SERTRALINE 150 MG: 50 TABLET, FILM COATED ORAL at 08:20

## 2021-03-07 RX ADMIN — RIVAROXABAN 20 MG: 20 TABLET, FILM COATED ORAL at 08:19

## 2021-03-07 RX ADMIN — ASPIRIN 81 MG: 81 TABLET, COATED ORAL at 08:19

## 2021-03-07 RX ADMIN — ALBUTEROL SULFATE 2 PUFF: 90 AEROSOL, METERED RESPIRATORY (INHALATION) at 15:13

## 2021-03-07 RX ADMIN — PANTOPRAZOLE SODIUM 40 MG: 40 TABLET, DELAYED RELEASE ORAL at 16:39

## 2021-03-07 RX ADMIN — POLYETHYLENE GLYCOL (3350) 17 G: 17 POWDER, FOR SOLUTION ORAL at 08:18

## 2021-03-07 RX ADMIN — ALPRAZOLAM 0.5 MG: 0.5 TABLET ORAL at 15:13

## 2021-03-07 RX ADMIN — Medication 5 MG: at 21:23

## 2021-03-07 RX ADMIN — ALBUTEROL SULFATE 2 PUFF: 90 AEROSOL, METERED RESPIRATORY (INHALATION) at 08:19

## 2021-03-07 NOTE — NURSING NOTE
PRE PROCEDURE COVID SWAB DONE VIA RIGHT NOSTRIL. CLIENT ASYMPTOMATIC, PROPER PPE USED    ADDENDUM TO CLARIFY

## 2021-03-07 NOTE — PROGRESS NOTES
"INPATIENT PSYCHIATRIC PROGRESS NOTE    Name:  Ernesto Easley  :  1950  MRN:  [id number]  Visit Number:  [id number]  Length of stay:  9    SUBJECTIVE  CC/Focus of Exam: depression    INTERVAL HISTORY:  The patient reports ongoing depression and anxiety but no SI. Scheduled for ECT on Monday.  Depression rating 10/10  Anxiety rating 10/10  Sleep: poor      Review of Systems   Gastrointestinal: Positive for constipation.   Psychiatric/Behavioral: Positive for dysphoric mood. The patient is nervous/anxious.        OBJECTIVE    Temp:  [97.7 °F (36.5 °C)-98.3 °F (36.8 °C)] 97.7 °F (36.5 °C)  Heart Rate:  [79-96] 84  Resp:  [16-18] 16  BP: ()/(60-77) 98/60    MENTAL STATUS EXAM:  Appearance:Casually dressed, good hygeine.   Cooperation:Cooperative  Psychomotor: No psychomotor agitation/retardation, No EPS, No motor tics  Speech-normal rate, amount.  Mood \"depressed\"   Affect-congruent, appropriate, stable  Suicidality: No SI  Homicidality: No HI  Perception: No AH/VH  Insight-poor   Judgement-poor    Lab Results (last 24 hours)     Procedure Component Value Units Date/Time    Basic Metabolic Panel [[id number]]  (Abnormal) Collected: 21 1010    Specimen: Blood Updated: 21 1047     Glucose 137 mg/dL      BUN 20 mg/dL      Creatinine 1.04 mg/dL      Sodium 138 mmol/L      Potassium 4.1 mmol/L      Chloride 102 mmol/L      CO2 26.3 mmol/L      Calcium 9.7 mg/dL      eGFR Non African Amer 71 mL/min/1.73      BUN/Creatinine Ratio 19.2     Anion Gap 9.7 mmol/L     Narrative:      GFR Normal >60  Chronic Kidney Disease <60  Kidney Failure <15               Imaging Results (Last 24 Hours)     ** No results found for the last 24 hours. **             ECG/EMG Results (most recent)     Procedure Component Value Units Date/Time    Adult Transthoracic Echo Complete W/ Cont if Necessary Per Protocol [[id number]] Collected: 21 1308     Updated: 21 1335     BSA 2.1 m^2      IVSd 0.64 cm      LVIDd 4.4 cm     "  LVIDs 2.1 cm      LVPWd 0.84 cm      IVS/LVPW 0.77     FS 52.9 %      EDV(Teich) 86.3 ml      ESV(Teich) 13.7 ml      EF(Teich) 84.1 %      EDV(cubed) 83.5 ml      ESV(cubed) 8.7 ml      EF(cubed) 89.5 %      LV mass(C)d 97.9 grams      LV mass(C)dI 47.5 grams/m^2      SV(Teich) 72.6 ml      SI(Teich) 35.2 ml/m^2      SV(cubed) 74.7 ml      SI(cubed) 36.2 ml/m^2      Ao root diam 2.9 cm      Ao root area 6.4 cm^2      ACS 2.0 cm      LA dimension 3.8 cm      LA/Ao 1.3     LVOT diam 2.2 cm      LVOT area 3.8 cm^2      LVOT area(traced) 3.8 cm^2      LVLd ap4 7.1 cm      EDV(MOD-sp4) 66.8 ml      LVLs ap4 6.4 cm      ESV(MOD-sp4) 21.9 ml      EF(MOD-sp4) 67.2 %      SV(MOD-sp4) 44.9 ml      SI(MOD-sp4) 21.8 ml/m^2      Ao root area (BSA corrected) 1.4     LV Thompson Vol (BSA corrected) 32.4 ml/m^2      LV Sys Vol (BSA corrected) 10.6 ml/m^2      MV E max gabe 101.0 cm/sec      MV A max gabe 77.1 cm/sec      MV E/A 1.3     Ao pk gabe 103.0 cm/sec      Ao max PG 4.2 mmHg      Ao V2 mean 83.8 cm/sec      Ao mean PG 3.0 mmHg      Ao V2 VTI 18.0 cm      SV(Ao) 114.8 ml      SI(Ao) 55.7 ml/m^2      PA acc time 0.11 sec      TR max gabe 213.0 cm/sec      RVSP(TR) 28.1 mmHg      RAP systole 10.0 mmHg      PA pr(Accel) 31.3 mmHg       CV ECHO KEVON - BZI_BMI 31.0 kilograms/m^2       CV ECHO KEVON - BSA(HAYCOCK) 2.1 m^2       CV ECHO KEVON - BZI_METRIC_WEIGHT 92.5 kg       CV ECHO KEVON - BZI_METRIC_HEIGHT 172.7 cm      Target HR (85%) 129 bpm      Max. Pred. HR (100%) 152 bpm     Narrative:      · Left ventricular ejection fraction appears to be 51 - 55%. Left   ventricular systolic function is normal.  · Left ventricular diastolic function was indeterminate.  · No significant functional valvular abnormalities noted  · There is no evidence of pericardial effusion       ECG 12 Lead [[id number]] Collected: 02/27/21 0153     Updated: 02/27/21 1617     QT Interval 432 ms      QTC Interval 522 ms     Narrative:      Test Reason :  Baseline Cardiac Status  Blood Pressure : **/** mmHG  Vent. Rate : 088 BPM     Atrial Rate : 141 BPM     P-R Int : 000 ms          QRS Dur : 102 ms      QT Int : 432 ms       P-R-T Axes : 000 049 -81 degrees     QTc Int : 522 ms    Atrial fibrillation  Nonspecific ST and T wave abnormality  Prolonged QT  Abnormal ECG  When compared with ECG of 26-FEB-2021 18:33, (Unconfirmed)  QT has lengthened  Confirmed by Christopher Aguilar (2020) on 2/27/2021 4:17:17 PM    Referred By:             Confirmed By:Christopher Aguilar    ECG 12 Lead [[id number]] Collected: 02/28/21 0721     Updated: 03/03/21 0920     QT Interval 350 ms      QTC Interval 446 ms     Narrative:      Test Reason : qtc monitoring  Blood Pressure : **/** mmHG  Vent. Rate : 098 BPM     Atrial Rate : 441 BPM     P-R Int : 000 ms          QRS Dur : 098 ms      QT Int : 350 ms       P-R-T Axes : 000 046 -82 degrees     QTc Int : 446 ms    Atrial fibrillation  ST & T wave abnormality, consider inferior ischemia  Abnormal ECG  When compared with ECG of 27-FEB-2021 01:53,  QT has shortened  Confirmed by Gabino Napier ([id number]) on 3/3/2021 9:19:51 AM    Referred By:  BAUDILIO           Confirmed By:Gabino Napier    ECG 12 Lead [[id number]] Collected: 03/03/21 1539     Updated: 03/04/21 1822     QT Interval 362 ms      QTC Interval 471 ms     Narrative:      Test Reason : Follow-up monitoring QTC  Blood Pressure : **/** mmHG  Vent. Rate : 102 BPM     Atrial Rate : 093 BPM     P-R Int : 000 ms          QRS Dur : 086 ms      QT Int : 362 ms       P-R-T Axes : 000 050 267 degrees     QTc Int : 471 ms    Atrial fibrillation with rapid ventricular response  ST & T wave abnormality, consider inferolateral ischemia  Abnormal ECG  When compared with ECG of 28-FEB-2021 07:21,  Inverted T waves have replaced nonspecific T wave abnormality in Lateral  leads  Confirmed by Gabino Napier ([id number]) on 3/4/2021 6:22:03 PM    Referred By:  CYRUS           Confirmed By:Gabino Napier    ECG  12 Lead [[id number]] Collected: 03/06/21 0918     Updated: 03/06/21 1300     QT Interval 364 ms      QTC Interval 440 ms     Narrative:      Test Reason : pre ect workup  Blood Pressure : **/** mmHG  Vent. Rate : 088 BPM     Atrial Rate : 144 BPM     P-R Int : 000 ms          QRS Dur : 088 ms      QT Int : 364 ms       P-R-T Axes : 000 014 -77 degrees     QTc Int : 440 ms    Atrial fibrillation  Nonspecific ST and T wave abnormality  Abnormal ECG  When compared with ECG of 06-MAR-2021 09:17, (Unconfirmed)  No significant change was found  Confirmed by Henry Atkins (2019) on 3/6/2021 1:00:02 PM    Referred By:  CYRUS           Confirmed By:Henry Atkins           ALLERGIES: Patient has no known allergies.      Current Facility-Administered Medications:   •  acetaminophen (TYLENOL) tablet 650 mg, 650 mg, Oral, Q6H PRN, Luis Miguel Gallo MD  •  albuterol sulfate HFA (PROVENTIL HFA;VENTOLIN HFA;PROAIR HFA) inhaler 2 puff, 2 puff, Inhalation, TID, Luis Miguel Gallo MD, 2 puff at 03/07/21 0819  •  ALPRAZolam (XANAX) tablet 0.5 mg, 0.5 mg, Oral, BID PRN, Luis Miguel Gallo MD, 0.5 mg at 03/04/21 1208  •  aluminum-magnesium hydroxide-simethicone (MAALOX MAX) 400-400-40 MG/5ML suspension 15 mL, 15 mL, Oral, Q6H PRN, Luis Miguel Gallo MD  •  amLODIPine (NORVASC) tablet 10 mg, 10 mg, Oral, Q24H, Christiana Holland PA-C, 10 mg at 03/06/21 0819  •  aspirin EC tablet 81 mg, 81 mg, Oral, Daily, Luis Miguel Gallo MD, 81 mg at 03/07/21 0819  •  benzonatate (TESSALON) capsule 100 mg, 100 mg, Oral, TID PRN, Luis Miguel Gallo MD  •  bisacodyl (DULCOLAX) EC tablet 5 mg, 5 mg, Oral, Daily PRN, Luis Miguel Gallo MD  •  buPROPion XL (WELLBUTRIN XL) 24 hr tablet 300 mg, 300 mg, Oral, Daily, Thomas De Leon MD, 300 mg at 03/07/21 0820  •  carvedilol (COREG) tablet 25 mg, 25 mg, Oral, BID With Meals, Christiana Holland PA-C, 25 mg at 03/06/21 0819  •  ibuprofen (ADVIL,MOTRIN) tablet 400 mg, 400 mg, Oral, Q6H PRN, Luis Miguel Gallo MD,  400 mg at 03/03/21 2045  •  loperamide (IMODIUM) capsule 2 mg, 2 mg, Oral, Q2H PRN, Luis Miguel Gallo MD  •  magnesium hydroxide (MILK OF MAGNESIA) suspension 2400 mg/10mL 10 mL, 10 mL, Oral, Daily PRN, Luis Miguel Gallo MD, 10 mL at 03/06/21 1722  •  melatonin tablet 3 mg, 3 mg, Oral, Nightly PRN, Luis Miguel Gallo MD  •  melatonin tablet 5 mg, 5 mg, Oral, Nightly, Luis Miguel Gallo MD, 5 mg at 03/06/21 2052  •  ondansetron (ZOFRAN) tablet 4 mg, 4 mg, Oral, Q6H PRN, Luis Miguel Gallo MD  •  pantoprazole (PROTONIX) EC tablet 40 mg, 40 mg, Oral, Q PM, Luis Miguel Gallo MD, 40 mg at 03/06/21 1720  •  polyethylene glycol (MIRALAX) packet 17 g, 17 g, Oral, Daily, Thomas De Leon MD, 17 g at 03/07/21 0818  •  rivaroxaban (XARELTO) tablet 20 mg, 20 mg, Oral, Daily, Luis Miguel Gallo MD, 20 mg at 03/07/21 0819  •  sertraline (ZOLOFT) tablet 150 mg, 150 mg, Oral, Daily, Thomas De Leon MD, 150 mg at 03/07/21 0820  •  sodium chloride nasal spray 2 spray, 2 spray, Each Nare, PRN, Luis Miguel Gallo MD    ASSESSMENT & PLAN:    Major Depressive Disorder with psychosis, recurrent   -Depression appears to have worsened since patient suffered from COVID this past January, compounded by A. fib diagnosis this past week  - Sertraline 150 mg daily .  - Wellbutrin XL 300 mg daily  - melatonin 5 mg nightly  - ECT starting Monday 3/8      A. Fib  -Recent diagnosis  -Continue Xarelto and aspirin     Prolonged QTC  -Improved, 440 on 3/6/21  -Appreciate cardiology's involvement   Selection of antidepressant medications finding least offensive agents in regards to prolonged QTC.  Sertraline and bupropion.     Hypertension  -Continue Coreg 25 mg twice daily  -Continue Norvasc 10 mg daily  -Continue losartan 50 mg/HCTZ 12.5 mg daily    Reviewed and agreed with above.    Special precautions: Special Precautions Level 3 (q15 min checks) .    Behavioral Health Treatment Plan and Problem List: I have reviewed and approved the Behavioral  Health Treatment Plan and Problem list.  The patient has had a chance to review and agrees with the treatment plan.     Clinician:  Eduardo Bhandari MD  03/07/21  13:35 EST

## 2021-03-07 NOTE — PLAN OF CARE
Goal Outcome Evaluation:  Plan of Care Reviewed With: patient  Progress: improving  Outcome Summary: CALM AND COOPERATIVE, INTERACTS APPROPRIATELY WITH STAFF. AVOIDS SOCIAL CONTACT. SP1 DISCONTINUED DURING THIS SHIFT BY DR. SMITH R/T [place]. CLIENT HAS BEEN URINATING DURING THIS SHIFT WITHOUT DIFFICULTY.     ADDENDUM TO ADD SHIFT INFORMATION.

## 2021-03-07 NOTE — PLAN OF CARE
Goal Outcome Evaluation:  Plan of Care Reviewed With: patient  Progress: improving  Outcome Summary: Pt has isolated to self most of shift talking to family when provided opportunity. Rates Anx 10 Dep 10 Denies SI, HI, or HI reports bad appetite. He is sleeping well but hasn't urinated this shift with intake of 360 ml will pass on in report. Will continue to monitor.

## 2021-03-07 NOTE — PROGRESS NOTES
"INPATIENT PSYCHIATRIC PROGRESS NOTE    Name:  Ernesto Easley  :  1950  MRN:  [id number]  Visit Number:  [id number]  Length of stay:  8    SUBJECTIVE  CC/Focus of Exam: depression    INTERVAL HISTORY:  The patient reports he is not feeling good and reports ongoing depression, denies SI. Scheduled for ECT on Monday.  Depression rating 10/10  Anxiety rating 10/10  Sleep: poor      Review of Systems   Gastrointestinal: Positive for constipation.   Psychiatric/Behavioral: Positive for dysphoric mood. The patient is nervous/anxious.        OBJECTIVE    Temp:  [97.4 °F (36.3 °C)] 97.4 °F (36.3 °C)  Heart Rate:  [79-97] 79  Resp:  [16-18] 16  BP: (104-129)/(61-87) 104/61    MENTAL STATUS EXAM:  Appearance:Casually dressed, good hygeine.   Cooperation:Cooperative  Psychomotor: No psychomotor agitation/retardation, No EPS, No motor tics  Speech-normal rate, amount.  Mood \"depressed\"   Affect-congruent, appropriate, stable  Suicidality: No SI  Homicidality: No HI  Perception: No AH/VH  Insight-poor   Judgement-poor    Lab Results (last 24 hours)     ** No results found for the last 24 hours. **             Imaging Results (Last 24 Hours)     ** No results found for the last 24 hours. **             ECG/EMG Results (most recent)     Procedure Component Value Units Date/Time    Adult Transthoracic Echo Complete W/ Cont if Necessary Per Protocol [[id number]] Collected: 21 1308     Updated: 21 1335     BSA 2.1 m^2      IVSd 0.64 cm      LVIDd 4.4 cm      LVIDs 2.1 cm      LVPWd 0.84 cm      IVS/LVPW 0.77     FS 52.9 %      EDV(Teich) 86.3 ml      ESV(Teich) 13.7 ml      EF(Teich) 84.1 %      EDV(cubed) 83.5 ml      ESV(cubed) 8.7 ml      EF(cubed) 89.5 %      LV mass(C)d 97.9 grams      LV mass(C)dI 47.5 grams/m^2      SV(Teich) 72.6 ml      SI(Teich) 35.2 ml/m^2      SV(cubed) 74.7 ml      SI(cubed) 36.2 ml/m^2      Ao root diam 2.9 cm      Ao root area 6.4 cm^2      ACS 2.0 cm      LA dimension 3.8 cm      LA/Ao " 1.3     LVOT diam 2.2 cm      LVOT area 3.8 cm^2      LVOT area(traced) 3.8 cm^2      LVLd ap4 7.1 cm      EDV(MOD-sp4) 66.8 ml      LVLs ap4 6.4 cm      ESV(MOD-sp4) 21.9 ml      EF(MOD-sp4) 67.2 %      SV(MOD-sp4) 44.9 ml      SI(MOD-sp4) 21.8 ml/m^2      Ao root area (BSA corrected) 1.4     LV Thompson Vol (BSA corrected) 32.4 ml/m^2      LV Sys Vol (BSA corrected) 10.6 ml/m^2      MV E max gabe 101.0 cm/sec      MV A max gabe 77.1 cm/sec      MV E/A 1.3     Ao pk gabe 103.0 cm/sec      Ao max PG 4.2 mmHg      Ao V2 mean 83.8 cm/sec      Ao mean PG 3.0 mmHg      Ao V2 VTI 18.0 cm      SV(Ao) 114.8 ml      SI(Ao) 55.7 ml/m^2      PA acc time 0.11 sec      TR max gabe 213.0 cm/sec      RVSP(TR) 28.1 mmHg      RAP systole 10.0 mmHg      PA pr(Accel) 31.3 mmHg       CV ECHO KEVON - BZI_BMI 31.0 kilograms/m^2       CV ECHO KEVON - BSA(HAYCOCK) 2.1 m^2       CV ECHO KEVON - BZI_METRIC_WEIGHT 92.5 kg       CV ECHO KEVON - BZI_METRIC_HEIGHT 172.7 cm      Target HR (85%) 129 bpm      Max. Pred. HR (100%) 152 bpm     Narrative:      · Left ventricular ejection fraction appears to be 51 - 55%. Left   ventricular systolic function is normal.  · Left ventricular diastolic function was indeterminate.  · No significant functional valvular abnormalities noted  · There is no evidence of pericardial effusion       ECG 12 Lead [[id number]] Collected: 02/27/21 0153     Updated: 02/27/21 1617     QT Interval 432 ms      QTC Interval 522 ms     Narrative:      Test Reason : Baseline Cardiac Status  Blood Pressure : **/** mmHG  Vent. Rate : 088 BPM     Atrial Rate : 141 BPM     P-R Int : 000 ms          QRS Dur : 102 ms      QT Int : 432 ms       P-R-T Axes : 000 049 -81 degrees     QTc Int : 522 ms    Atrial fibrillation  Nonspecific ST and T wave abnormality  Prolonged QT  Abnormal ECG  When compared with ECG of 26-FEB-2021 18:33, (Unconfirmed)  QT has lengthened  Confirmed by Christopher Aguilar ([id number]) on 2/27/2021 4:17:17 PM    Referred  By:             Confirmed By:Christopher Aguilar    ECG 12 Lead [[id number]] Collected: 02/28/21 0721     Updated: 03/03/21 0920     QT Interval 350 ms      QTC Interval 446 ms     Narrative:      Test Reason : qtc monitoring  Blood Pressure : **/** mmHG  Vent. Rate : 098 BPM     Atrial Rate : 441 BPM     P-R Int : 000 ms          QRS Dur : 098 ms      QT Int : 350 ms       P-R-T Axes : 000 046 -82 degrees     QTc Int : 446 ms    Atrial fibrillation  ST & T wave abnormality, consider inferior ischemia  Abnormal ECG  When compared with ECG of 27-FEB-2021 01:53,  QT has shortened  Confirmed by Gabino Napier ([id number]) on 3/3/2021 9:19:51 AM    Referred By:  BAUDILIO           Confirmed By:Gabino Napier    ECG 12 Lead [[id number]] Collected: 03/03/21 1539     Updated: 03/04/21 1822     QT Interval 362 ms      QTC Interval 471 ms     Narrative:      Test Reason : Follow-up monitoring QTC  Blood Pressure : **/** mmHG  Vent. Rate : 102 BPM     Atrial Rate : 093 BPM     P-R Int : 000 ms          QRS Dur : 086 ms      QT Int : 362 ms       P-R-T Axes : 000 050 267 degrees     QTc Int : 471 ms    Atrial fibrillation with rapid ventricular response  ST & T wave abnormality, consider inferolateral ischemia  Abnormal ECG  When compared with ECG of 28-FEB-2021 07:21,  Inverted T waves have replaced nonspecific T wave abnormality in Lateral  leads  Confirmed by Gabino Napier ([id number]) on 3/4/2021 6:22:03 PM    Referred By:  CYRUS           Confirmed By:Gabino Napier    ECG 12 Lead [[id number]] Collected: 03/06/21 0918     Updated: 03/06/21 1300     QT Interval 364 ms      QTC Interval 440 ms     Narrative:      Test Reason : pre ect workup  Blood Pressure : **/** mmHG  Vent. Rate : 088 BPM     Atrial Rate : 144 BPM     P-R Int : 000 ms          QRS Dur : 088 ms      QT Int : 364 ms       P-R-T Axes : 000 014 -77 degrees     QTc Int : 440 ms    Atrial fibrillation  Nonspecific ST and T wave abnormality  Abnormal ECG  When compared with ECG  of 06-MAR-2021 09:17, (Unconfirmed)  No significant change was found  Confirmed by Henry Atkins (2019) on 3/6/2021 1:00:02 PM    Referred By:  CYRUS           Confirmed By:Henry Atkins           ALLERGIES: Patient has no known allergies.      Current Facility-Administered Medications:   •  acetaminophen (TYLENOL) tablet 650 mg, 650 mg, Oral, Q6H PRN, Luis Miguel Gallo MD  •  albuterol sulfate HFA (PROVENTIL HFA;VENTOLIN HFA;PROAIR HFA) inhaler 2 puff, 2 puff, Inhalation, TID, Luis Miguel Gallo MD, 2 puff at 03/06/21 1720  •  ALPRAZolam (XANAX) tablet 0.5 mg, 0.5 mg, Oral, BID PRN, Luis Miguel Gallo MD, 0.5 mg at 03/04/21 1208  •  aluminum-magnesium hydroxide-simethicone (MAALOX MAX) 400-400-40 MG/5ML suspension 15 mL, 15 mL, Oral, Q6H PRN, Luis Miguel Gallo MD  •  amLODIPine (NORVASC) tablet 10 mg, 10 mg, Oral, Q24H, Christiana Holland PA-C, 10 mg at 03/06/21 0819  •  aspirin EC tablet 81 mg, 81 mg, Oral, Daily, Luis Miguel Gallo MD, 81 mg at 03/06/21 0819  •  benzonatate (TESSALON) capsule 100 mg, 100 mg, Oral, TID PRN, Luis Miguel Gallo MD  •  bisacodyl (DULCOLAX) EC tablet 5 mg, 5 mg, Oral, Daily PRN, Luis Miguel Gallo MD  •  buPROPion XL (WELLBUTRIN XL) 24 hr tablet 300 mg, 300 mg, Oral, Daily, Thomas De Leon MD, 300 mg at 03/06/21 0819  •  carvedilol (COREG) tablet 25 mg, 25 mg, Oral, BID With Meals, Christiana Holland PA-C, 25 mg at 03/06/21 0819  •  ibuprofen (ADVIL,MOTRIN) tablet 400 mg, 400 mg, Oral, Q6H PRN, Luis Miguel Gallo MD, 400 mg at 03/03/21 2045  •  loperamide (IMODIUM) capsule 2 mg, 2 mg, Oral, Q2H PRN, Luis Miguel Gallo MD  •  magnesium hydroxide (MILK OF MAGNESIA) suspension 2400 mg/10mL 10 mL, 10 mL, Oral, Daily PRN, Luis Miguel Gallo MD, 10 mL at 03/06/21 1722  •  melatonin tablet 3 mg, 3 mg, Oral, Nightly PRN, Luis Miguel Gallo MD  •  melatonin tablet 5 mg, 5 mg, Oral, Nightly, Luis Miguel Gallo MD, 5 mg at 03/05/21 2112  •  ondansetron (ZOFRAN) tablet 4 mg, 4 mg, Oral, Q6H PRN,  Luis Miguel Gallo MD  •  pantoprazole (PROTONIX) EC tablet 40 mg, 40 mg, Oral, Q PM, Luis Miguel Gallo MD, 40 mg at 03/06/21 1720  •  polyethylene glycol (MIRALAX) packet 17 g, 17 g, Oral, Daily, Thomas De Leon MD, 17 g at 03/06/21 0819  •  rivaroxaban (XARELTO) tablet 20 mg, 20 mg, Oral, Daily, Luis Migeul Gallo MD, 20 mg at 03/06/21 0819  •  sertraline (ZOLOFT) tablet 150 mg, 150 mg, Oral, Daily, Thomas De Leon MD, 150 mg at 03/06/21 0819  •  sodium chloride nasal spray 2 spray, 2 spray, Each Nare, PRN, Luis Miguel Gallo MD    ASSESSMENT & PLAN:    Major Depressive Disorder with psychosis, recurrent     -Depression appears to have worsened since patient suffered from COVID this past January, compounded by A. fib diagnosis this past week  - Sertraline 150 mg daily .  - Wellbutrin XL 300 mg daily  - melatonin 5 mg nightly  -ECT starting Monday 3/8      A. Fib  -Recent diagnosis  -Continue Xarelto and aspirin     Prolonged QTC  -Improved, 440 on 3/6/21  -Appreciate cardiology's involvement   Selection of antidepressant medications finding least offensive agents in regards to prolonged QTC.  Sertraline and bupropion.     Hypertension  -Continue Coreg 25 mg twice daily  -Continue Norvasc 10 mg daily  -Continue losartan 50 mg/HCTZ 12.5 mg daily    Reviewed and agree with above.    Special precautions: Special Precautions Level 3 (q15 min checks) .    Behavioral Health Treatment Plan and Problem List: I have reviewed and approved the Behavioral Health Treatment Plan and Problem list.  The patient has had a chance to review and agrees with the treatment plan.     Clinician:  Eduardo Bhandari MD  03/06/21  19:42 EST

## 2021-03-08 ENCOUNTER — ANESTHESIA (OUTPATIENT)
Dept: PERIOP | Facility: HOSPITAL | Age: 71
End: 2021-03-08

## 2021-03-08 PROCEDURE — 25010000002 ONDANSETRON PER 1 MG: Performed by: NURSE ANESTHETIST, CERTIFIED REGISTERED

## 2021-03-08 PROCEDURE — GZB2ZZZ ELECTROCONVULSIVE THERAPY, BILATERAL-SINGLE SEIZURE: ICD-10-PCS | Performed by: PSYCHIATRY & NEUROLOGY

## 2021-03-08 PROCEDURE — 90870 ELECTROCONVULSIVE THERAPY: CPT | Performed by: PSYCHIATRY & NEUROLOGY

## 2021-03-08 PROCEDURE — 25010000002 KETOROLAC TROMETHAMINE PER 15 MG: Performed by: NURSE ANESTHETIST, CERTIFIED REGISTERED

## 2021-03-08 PROCEDURE — 25010000002 SUCCINYLCHOLINE PER 20 MG: Performed by: NURSE ANESTHETIST, CERTIFIED REGISTERED

## 2021-03-08 PROCEDURE — 25010000002 MIDAZOLAM PER 1 MG: Performed by: NURSE ANESTHETIST, CERTIFIED REGISTERED

## 2021-03-08 RX ORDER — FENTANYL CITRATE 50 UG/ML
50 INJECTION, SOLUTION INTRAMUSCULAR; INTRAVENOUS
Status: DISCONTINUED | OUTPATIENT
Start: 2021-03-08 | End: 2021-03-08

## 2021-03-08 RX ORDER — MIDAZOLAM HYDROCHLORIDE 1 MG/ML
INJECTION INTRAMUSCULAR; INTRAVENOUS AS NEEDED
Status: DISCONTINUED | OUTPATIENT
Start: 2021-03-08 | End: 2021-03-08 | Stop reason: SURG

## 2021-03-08 RX ORDER — DROPERIDOL 2.5 MG/ML
0.62 INJECTION, SOLUTION INTRAMUSCULAR; INTRAVENOUS ONCE AS NEEDED
Status: DISCONTINUED | OUTPATIENT
Start: 2021-03-08 | End: 2021-03-08

## 2021-03-08 RX ORDER — SODIUM CHLORIDE, SODIUM LACTATE, POTASSIUM CHLORIDE, CALCIUM CHLORIDE 600; 310; 30; 20 MG/100ML; MG/100ML; MG/100ML; MG/100ML
INJECTION, SOLUTION INTRAVENOUS CONTINUOUS PRN
Status: DISCONTINUED | OUTPATIENT
Start: 2021-03-08 | End: 2021-03-08 | Stop reason: SURG

## 2021-03-08 RX ORDER — SUCCINYLCHOLINE CHLORIDE 20 MG/ML
INJECTION INTRAMUSCULAR; INTRAVENOUS AS NEEDED
Status: DISCONTINUED | OUTPATIENT
Start: 2021-03-08 | End: 2021-03-08 | Stop reason: SURG

## 2021-03-08 RX ORDER — MEPERIDINE HYDROCHLORIDE 25 MG/ML
12.5 INJECTION INTRAMUSCULAR; INTRAVENOUS; SUBCUTANEOUS
Status: DISCONTINUED | OUTPATIENT
Start: 2021-03-08 | End: 2021-03-08

## 2021-03-08 RX ORDER — SODIUM CHLORIDE, SODIUM LACTATE, POTASSIUM CHLORIDE, CALCIUM CHLORIDE 600; 310; 30; 20 MG/100ML; MG/100ML; MG/100ML; MG/100ML
100 INJECTION, SOLUTION INTRAVENOUS ONCE AS NEEDED
Status: DISCONTINUED | OUTPATIENT
Start: 2021-03-08 | End: 2021-03-08

## 2021-03-08 RX ORDER — KETOROLAC TROMETHAMINE 30 MG/ML
INJECTION, SOLUTION INTRAMUSCULAR; INTRAVENOUS AS NEEDED
Status: DISCONTINUED | OUTPATIENT
Start: 2021-03-08 | End: 2021-03-08 | Stop reason: SURG

## 2021-03-08 RX ORDER — MIDAZOLAM HYDROCHLORIDE 1 MG/ML
0.5 INJECTION INTRAMUSCULAR; INTRAVENOUS
Status: DISCONTINUED | OUTPATIENT
Start: 2021-03-08 | End: 2021-03-08 | Stop reason: HOSPADM

## 2021-03-08 RX ORDER — ONDANSETRON 2 MG/ML
INJECTION INTRAMUSCULAR; INTRAVENOUS AS NEEDED
Status: DISCONTINUED | OUTPATIENT
Start: 2021-03-08 | End: 2021-03-08 | Stop reason: SURG

## 2021-03-08 RX ORDER — SODIUM CHLORIDE 0.9 % (FLUSH) 0.9 %
10 SYRINGE (ML) INJECTION AS NEEDED
Status: DISCONTINUED | OUTPATIENT
Start: 2021-03-08 | End: 2021-03-08 | Stop reason: HOSPADM

## 2021-03-08 RX ORDER — SODIUM CHLORIDE, SODIUM LACTATE, POTASSIUM CHLORIDE, CALCIUM CHLORIDE 600; 310; 30; 20 MG/100ML; MG/100ML; MG/100ML; MG/100ML
125 INJECTION, SOLUTION INTRAVENOUS ONCE
Status: COMPLETED | OUTPATIENT
Start: 2021-03-08 | End: 2021-03-08

## 2021-03-08 RX ORDER — IPRATROPIUM BROMIDE AND ALBUTEROL SULFATE 2.5; .5 MG/3ML; MG/3ML
3 SOLUTION RESPIRATORY (INHALATION) ONCE AS NEEDED
Status: DISCONTINUED | OUTPATIENT
Start: 2021-03-08 | End: 2021-03-08

## 2021-03-08 RX ORDER — MIDAZOLAM HYDROCHLORIDE 1 MG/ML
1 INJECTION INTRAMUSCULAR; INTRAVENOUS
Status: DISCONTINUED | OUTPATIENT
Start: 2021-03-08 | End: 2021-03-08 | Stop reason: HOSPADM

## 2021-03-08 RX ORDER — ONDANSETRON 2 MG/ML
4 INJECTION INTRAMUSCULAR; INTRAVENOUS AS NEEDED
Status: DISCONTINUED | OUTPATIENT
Start: 2021-03-08 | End: 2021-03-08

## 2021-03-08 RX ORDER — SODIUM CHLORIDE 0.9 % (FLUSH) 0.9 %
10 SYRINGE (ML) INJECTION EVERY 12 HOURS SCHEDULED
Status: DISCONTINUED | OUTPATIENT
Start: 2021-03-08 | End: 2021-03-08 | Stop reason: HOSPADM

## 2021-03-08 RX ORDER — OXYCODONE HYDROCHLORIDE AND ACETAMINOPHEN 5; 325 MG/1; MG/1
1 TABLET ORAL ONCE AS NEEDED
Status: DISCONTINUED | OUTPATIENT
Start: 2021-03-08 | End: 2021-03-08

## 2021-03-08 RX ADMIN — SODIUM CHLORIDE, POTASSIUM CHLORIDE, SODIUM LACTATE AND CALCIUM CHLORIDE 125 ML/HR: 600; 310; 30; 20 INJECTION, SOLUTION INTRAVENOUS at 08:00

## 2021-03-08 RX ADMIN — ALBUTEROL SULFATE 2 PUFF: 90 AEROSOL, METERED RESPIRATORY (INHALATION) at 09:33

## 2021-03-08 RX ADMIN — BUPROPION HYDROCHLORIDE 300 MG: 150 TABLET, FILM COATED, EXTENDED RELEASE ORAL at 09:33

## 2021-03-08 RX ADMIN — ONDANSETRON 4 MG: 2 INJECTION INTRAMUSCULAR; INTRAVENOUS at 08:30

## 2021-03-08 RX ADMIN — MIDAZOLAM 2 MG: 1 INJECTION INTRAMUSCULAR; INTRAVENOUS at 08:30

## 2021-03-08 RX ADMIN — PANTOPRAZOLE SODIUM 40 MG: 40 TABLET, DELAYED RELEASE ORAL at 16:10

## 2021-03-08 RX ADMIN — SUCCINYLCHOLINE CHLORIDE 100 MG: 20 INJECTION, SOLUTION INTRAMUSCULAR; INTRAVENOUS at 08:23

## 2021-03-08 RX ADMIN — ALBUTEROL SULFATE 2 PUFF: 90 AEROSOL, METERED RESPIRATORY (INHALATION) at 15:00

## 2021-03-08 RX ADMIN — POLYETHYLENE GLYCOL (3350) 17 G: 17 POWDER, FOR SOLUTION ORAL at 09:33

## 2021-03-08 RX ADMIN — ALBUTEROL SULFATE 2 PUFF: 90 AEROSOL, METERED RESPIRATORY (INHALATION) at 21:37

## 2021-03-08 RX ADMIN — CARVEDILOL 25 MG: 25 TABLET, FILM COATED ORAL at 09:34

## 2021-03-08 RX ADMIN — Medication 5 MG: at 21:37

## 2021-03-08 RX ADMIN — SERTRALINE 150 MG: 50 TABLET, FILM COATED ORAL at 09:33

## 2021-03-08 RX ADMIN — RIVAROXABAN 20 MG: 20 TABLET, FILM COATED ORAL at 10:08

## 2021-03-08 RX ADMIN — ASPIRIN 81 MG: 81 TABLET, COATED ORAL at 10:07

## 2021-03-08 RX ADMIN — KETOROLAC TROMETHAMINE 30 MG: 30 INJECTION, SOLUTION INTRAMUSCULAR at 08:30

## 2021-03-08 RX ADMIN — SODIUM CHLORIDE, POTASSIUM CHLORIDE, SODIUM LACTATE AND CALCIUM CHLORIDE: 600; 310; 30; 20 INJECTION, SOLUTION INTRAVENOUS at 08:06

## 2021-03-08 RX ADMIN — AMLODIPINE BESYLATE 10 MG: 10 TABLET ORAL at 09:34

## 2021-03-08 RX ADMIN — METHOHEXITAL SODIUM 100 MG: 500 INJECTION, POWDER, LYOPHILIZED, FOR SOLUTION INTRAMUSCULAR; INTRAVENOUS; RECTAL at 08:23

## 2021-03-08 NOTE — ANESTHESIA PREPROCEDURE EVALUATION
Anesthesia Evaluation     no history of anesthetic complications:  NPO Solid Status: > 8 hours  NPO Liquid Status: > 8 hours           Airway   Mallampati: II  TM distance: >3 FB  Neck ROM: full  No difficulty expected  Dental    (+) upper dentures    Pulmonary - normal exam   Cardiovascular - normal exam    (+) hypertension, dysrhythmias Atrial Fib,       Neuro/Psych  (+) psychiatric history Depression,     GI/Hepatic/Renal/Endo    (+)  PUD,      Musculoskeletal     Abdominal  - normal exam   Substance History      OB/GYN          Other                          Anesthesia Plan    ASA 2     general     intravenous induction     Anesthetic plan, all risks, benefits, and alternatives have been provided, discussed and informed consent has been obtained with: patient.

## 2021-03-08 NOTE — PLAN OF CARE
Goal Outcome Evaluation:  Plan of Care Reviewed With: patient  Progress: improving  Outcome Summary: Patient eating and sleeping better. He has been interacting well with peers he looks forward to conversing with family nightly. Will continue to monitor

## 2021-03-08 NOTE — ANESTHESIA POSTPROCEDURE EVALUATION
Patient: Ernesto Easley    Procedure Summary     Date: 03/08/21 Room / Location: [facility] OR  /  [facility] OR    Anesthesia Start: 0806 Anesthesia Stop: 0840    Procedure: BIFRONTAL ELECTROCONVULSIVE THERAPY (Bilateral ) Diagnosis:       Severe episode of recurrent major depressive disorder, without psychotic features (CMS/HCC)      (Severe episode of recurrent major depressive disorder, without psychotic features (CMS/HCC) [F33.2])    Surgeons: Thomas De Leon MD Provider: Jn Edge MD    Anesthesia Type: general ASA Status: 2          Anesthesia Type: general    Vitals  Vitals Value Taken Time   /86 03/08/21 0910   Temp 98.2 °F (36.8 °C) 03/08/21 0910   Pulse 78 03/08/21 0910   Resp 16 03/08/21 0910   SpO2 98 % 03/08/21 0910           Post Anesthesia Care and Evaluation    Patient location during evaluation: bedside  Patient participation: complete - patient participated  Level of consciousness: awake and alert  Pain score: 1  Pain management: adequate  Airway patency: patent  Anesthetic complications: No anesthetic complications  PONV Status: none  Cardiovascular status: acceptable  Respiratory status: acceptable  Hydration status: acceptable

## 2021-03-08 NOTE — PLAN OF CARE
Goal Outcome Evaluation:  Plan of Care Reviewed With: patient  Progress: improving  Outcome Summary: Therapist staffed case with nursing staff on this date.    Problem: Adult Behavioral Health Plan of Care  Goal: Plan of Care Review  Outcome: Ongoing, Progressing  Flowsheets (Taken 3/8/2021 1059)  Progress: improving  Plan of Care Reviewed With: patient  Patient Agreement with Plan of Care: agrees  Outcome Summary: Therapist staffed case with nursing staff on this date.  Goal: Develops/Participates in Therapeutic [place] to Support Successful Transition  Outcome: Ongoing, Progressing  Intervention: Mutually Develop Transition Plan  Flowsheets (Taken 3/8/2021 1059)  Transition Support: follow-up care coordinated    1059:    DATA: Therapist met with Patient individually this date. Patient agreeable to discuss current treatment progress and discharge concerns.     Therapist staffed case with nursing staff on this date. Patient received his first ECT today and was observed to be in bed asleep. Patient tolerated treatment well.     CLINICAL MANUVERING/INTERVENTIONS:  Assisted Patient in processing session content; acknowledged and normalized Patient’s thoughts, feelings, and concerns by utilizing a person-centered approach in efforts to build appropriate rapport and a positive therapeutic relationship with open and honest communication. Allowed Patient to ventilate regarding current stressors and triggers for negative emotions and thoughts in a safe nonjudgmental environment with unconditional positive regard, active listening skills, and empathy.     ASSESSMENT: Patient was seen today for a follow up. Patient was observed to be asleep in bed after getting get back from receiving ECT. Per nursing, Patient tolerated treatment well.     PLAN: Patient will continue stabilization. Patient will continue to receive services offered by Treatment Team.     Patient will follow-[facility] with the [facility].    Assistance with  transportation will not be needed. Family member will provide.    no

## 2021-03-08 NOTE — PLAN OF CARE
Problem: Adult Behavioral Health Plan of Care  Goal: Plan of Care Review  Flowsheets  Taken 3/8/2021 1504  Progress: no change  Plan of Care Reviewed With: patient  Patient Agreement with Plan of Care: agrees  Taken 3/8/2021 0719  Plan of Care Reviewed With: patient  Patient Agreement with Plan of Care: agrees   Goal Outcome Evaluation:  Plan of Care Reviewed With: patient  Progress: no change

## 2021-03-09 PROCEDURE — 99232 SBSQ HOSP IP/OBS MODERATE 35: CPT | Performed by: PSYCHIATRY & NEUROLOGY

## 2021-03-09 RX ADMIN — Medication 5 MG: at 21:21

## 2021-03-09 RX ADMIN — ALBUTEROL SULFATE 2 PUFF: 90 AEROSOL, METERED RESPIRATORY (INHALATION) at 15:33

## 2021-03-09 RX ADMIN — ASPIRIN 81 MG: 81 TABLET, COATED ORAL at 08:19

## 2021-03-09 RX ADMIN — SERTRALINE 150 MG: 50 TABLET, FILM COATED ORAL at 08:20

## 2021-03-09 RX ADMIN — BUPROPION HYDROCHLORIDE 300 MG: 150 TABLET, FILM COATED, EXTENDED RELEASE ORAL at 08:20

## 2021-03-09 RX ADMIN — RIVAROXABAN 20 MG: 20 TABLET, FILM COATED ORAL at 08:19

## 2021-03-09 RX ADMIN — ALBUTEROL SULFATE 2 PUFF: 90 AEROSOL, METERED RESPIRATORY (INHALATION) at 08:20

## 2021-03-09 RX ADMIN — CARVEDILOL 25 MG: 25 TABLET, FILM COATED ORAL at 16:07

## 2021-03-09 RX ADMIN — POLYETHYLENE GLYCOL (3350) 17 G: 17 POWDER, FOR SOLUTION ORAL at 08:19

## 2021-03-09 RX ADMIN — PANTOPRAZOLE SODIUM 40 MG: 40 TABLET, DELAYED RELEASE ORAL at 16:08

## 2021-03-09 RX ADMIN — ALBUTEROL SULFATE 2 PUFF: 90 AEROSOL, METERED RESPIRATORY (INHALATION) at 21:18

## 2021-03-09 NOTE — PROGRESS NOTES
"INPATIENT PSYCHIATRIC PROGRESS NOTE    Name:  Ernesto Easley  :  1950  MRN:  [id number]  Visit Number:  [id number]  Length of stay:  11    Behavioral Health Treatment Plan and Problem List: I have reviewed and approved the Behavioral Health Treatment Plan and Problem list.    SUBJECTIVE    CC/ Focus of exam: Depression    Patient's subjective status: \"having a hard time\"     INTERVAL HISTORY: Patient states she is still depressed with a sense of hopelessness but general interactions and spontaneous reactions indicate improved mood, eating and taking this medication and is agreeable with proceeding with ECT, next treatment scheduled for tomorrow.    Depression rating 10/10  Anxiety rating 10/10  Sleep: Terminal insomnia         Review of Systems   Respiratory: Negative.    Cardiovascular: Negative.    Gastrointestinal: Negative.    Neurological: Negative.          OBJECTIVE    Temp:  [97.7 °F (36.5 °C)-98.2 °F (36.8 °C)] 97.8 °F (36.6 °C)  Heart Rate:  [73-98] 74  Resp:  [12-18] 18  BP: ()/(60-94) 104/72    MENTAL STATUS EXAM:        Psychomotor: No psychomotor agitation/retardation, No EPS, No motor tics  Speech-normal rate, amount.  Mood/Affect:  Depressed  Thought Processes:  coherent  Thought Content:  mood congruent  Hallucination(s): none  Hopelessness: Yes  Optimistic:minimally  Suicidal Thoughts:   No  Suicidal Plan/Intent:  No  Homicidal Thoughts:  absent  Orientation: oriented x 3  Memory: recent intact    Lab Results (last 24 hours)     ** No results found for the last 24 hours. **           Imaging Results (Last 24 Hours)     ** No results found for the last 24 hours. **           ECG/EMG Results (most recent)     Procedure Component Value Units Date/Time    Adult Transthoracic Echo Complete W/ Cont if Necessary Per Protocol [[id number]] Collected: 21 1308     Updated: 21 1335     BSA 2.1 m^2      IVSd 0.64 cm      LVIDd 4.4 cm      LVIDs 2.1 cm      LVPWd 0.84 cm      IVS/LVPW 0.77 "     FS 52.9 %      EDV(Teich) 86.3 ml      ESV(Teich) 13.7 ml      EF(Teich) 84.1 %      EDV(cubed) 83.5 ml      ESV(cubed) 8.7 ml      EF(cubed) 89.5 %      LV mass(C)d 97.9 grams      LV mass(C)dI 47.5 grams/m^2      SV(Teich) 72.6 ml      SI(Teich) 35.2 ml/m^2      SV(cubed) 74.7 ml      SI(cubed) 36.2 ml/m^2      Ao root diam 2.9 cm      Ao root area 6.4 cm^2      ACS 2.0 cm      LA dimension 3.8 cm      LA/Ao 1.3     LVOT diam 2.2 cm      LVOT area 3.8 cm^2      LVOT area(traced) 3.8 cm^2      LVLd ap4 7.1 cm      EDV(MOD-sp4) 66.8 ml      LVLs ap4 6.4 cm      ESV(MOD-sp4) 21.9 ml      EF(MOD-sp4) 67.2 %      SV(MOD-sp4) 44.9 ml      SI(MOD-sp4) 21.8 ml/m^2      Ao root area (BSA corrected) 1.4     LV Thompson Vol (BSA corrected) 32.4 ml/m^2      LV Sys Vol (BSA corrected) 10.6 ml/m^2      MV E max gabe 101.0 cm/sec      MV A max gabe 77.1 cm/sec      MV E/A 1.3     Ao pk gabe 103.0 cm/sec      Ao max PG 4.2 mmHg      Ao V2 mean 83.8 cm/sec      Ao mean PG 3.0 mmHg      Ao V2 VTI 18.0 cm      SV(Ao) 114.8 ml      SI(Ao) 55.7 ml/m^2      PA acc time 0.11 sec      TR max gabe 213.0 cm/sec      RVSP(TR) 28.1 mmHg      RAP systole 10.0 mmHg      PA pr(Accel) 31.3 mmHg       CV ECHO KEVON - BZI_BMI 31.0 kilograms/m^2       CV ECHO KEVON - BSA([facility]) 2.1 m^2       CV ECHO KEVON - BZI_METRIC_WEIGHT 92.5 kg       CV ECHO KEVON - BZI_METRIC_HEIGHT 172.7 cm      Target HR (85%) 129 bpm      Max. Pred. HR (100%) 152 bpm     Narrative:      · Left ventricular ejection fraction appears to be 51 - 55%. Left   ventricular systolic function is normal.  · Left ventricular diastolic function was indeterminate.  · No significant functional valvular abnormalities noted  · There is no evidence of pericardial effusion       ECG 12 Lead [[id number]] Collected: 02/27/21 0153     Updated: 02/27/21 1617     QT Interval 432 ms      QTC Interval 522 ms     Narrative:      Test Reason : Baseline Cardiac Status  Blood Pressure : **/**  mmHG  Vent. Rate : 088 BPM     Atrial Rate : 141 BPM     P-R Int : 000 ms          QRS Dur : 102 ms      QT Int : 432 ms       P-R-T Axes : 000 049 -81 degrees     QTc Int : 522 ms    Atrial fibrillation  Nonspecific ST and T wave abnormality  Prolonged QT  Abnormal ECG  When compared with ECG of 26-FEB-2021 18:33, (Unconfirmed)  QT has lengthened  Confirmed by Christopher Aguilar ([id number]) on 2/27/2021 4:17:17 PM    Referred By:             Confirmed By:Christopher Aguilar    ECG 12 Lead [[id number]] Collected: 02/28/21 0721     Updated: 03/03/21 0920     QT Interval 350 ms      QTC Interval 446 ms     Narrative:      Test Reason : qtc monitoring  Blood Pressure : **/** mmHG  Vent. Rate : 098 BPM     Atrial Rate : 441 BPM     P-R Int : 000 ms          QRS Dur : 098 ms      QT Int : 350 ms       P-R-T Axes : 000 046 -82 degrees     QTc Int : 446 ms    Atrial fibrillation  ST & T wave abnormality, consider inferior ischemia  Abnormal ECG  When compared with ECG of 27-FEB-2021 01:53,  QT has shortened  Confirmed by Gabino Napier ([id number]) on 3/3/2021 9:19:51 AM    Referred By:  BAUDILIO           Confirmed By:Gabino Napier    ECG 12 Lead [[id number]] Collected: 03/03/21 1539     Updated: 03/04/21 1822     QT Interval 362 ms      QTC Interval 471 ms     Narrative:      Test Reason : Follow-up monitoring QTC  Blood Pressure : **/** mmHG  Vent. Rate : 102 BPM     Atrial Rate : 093 BPM     P-R Int : 000 ms          QRS Dur : 086 ms      QT Int : 362 ms       P-R-T Axes : 000 050 267 degrees     QTc Int : 471 ms    Atrial fibrillation with rapid ventricular response  ST & T wave abnormality, consider inferolateral ischemia  Abnormal ECG  When compared with ECG of 28-FEB-2021 07:21,  Inverted T waves have replaced nonspecific T wave abnormality in Lateral  leads  Confirmed by Gabino Napier ([id number]) on 3/4/2021 6:22:03 PM    Referred By:  CYRUS           Confirmed By:Gabino Napier    ECG 12 Lead [[id number]] Collected: 03/06/21 0918      Updated: 03/06/21 1300     QT Interval 364 ms      QTC Interval 440 ms     Narrative:      Test Reason : pre ect workup  Blood Pressure : **/** mmHG  Vent. Rate : 088 BPM     Atrial Rate : 144 BPM     P-R Int : 000 ms          QRS Dur : 088 ms      QT Int : 364 ms       P-R-T Axes : 000 014 -77 degrees     QTc Int : 440 ms    Atrial fibrillation  Nonspecific ST and T wave abnormality  Abnormal ECG  When compared with ECG of 06-MAR-2021 09:17, (Unconfirmed)  No significant change was found  Confirmed by Henry Atkins (2019) on 3/6/2021 1:00:02 PM    Referred By:  CYRUS           Confirmed By:Henry Atkins           ALLERGIES: Patient has no known allergies.      Current Facility-Administered Medications:   •  acetaminophen (TYLENOL) tablet 650 mg, 650 mg, Oral, Q6H PRN, Thomas De Leon MD  •  albuterol sulfate HFA (PROVENTIL HFA;VENTOLIN HFA;PROAIR HFA) inhaler 2 puff, 2 puff, Inhalation, TID, Thomas De Leon MD, 2 puff at 03/09/21 0820  •  ALPRAZolam (XANAX) tablet 0.5 mg, 0.5 mg, Oral, BID PRN, Thomas De Leon MD, 0.5 mg at 03/07/21 1513  •  aluminum-magnesium hydroxide-simethicone (MAALOX MAX) 400-400-40 MG/5ML suspension 15 mL, 15 mL, Oral, Q6H PRN, Thomas De Leon MD  •  amLODIPine (NORVASC) tablet 10 mg, 10 mg, Oral, Q24H, Thomas De Leon MD, 10 mg at 03/08/21 0934  •  aspirin EC tablet 81 mg, 81 mg, Oral, Daily, Thomas De Leon MD, 81 mg at 03/09/21 0819  •  benzonatate (TESSALON) capsule 100 mg, 100 mg, Oral, TID PRN, Thomas De Leon MD  •  bisacodyl (DULCOLAX) EC tablet 5 mg, 5 mg, Oral, Daily PRN, Thomas De Leon MD  •  buPROPion XL (WELLBUTRIN XL) 24 hr tablet 300 mg, 300 mg, Oral, Daily, Cyrus, Thomas Linwood, MD, 300 mg at 03/09/21 0820  •  carvedilol (COREG) tablet 25 mg, 25 mg, Oral, BID With Meals, Thomas De Leon MD, 25 mg at 03/08/21 0934  •  ibuprofen (ADVIL,MOTRIN) tablet 400 mg, 400 mg, Oral, Q6H PRN,  Thomas De Leon MD, 400 mg at 03/03/21 2045  •  loperamide (IMODIUM) capsule 2 mg, 2 mg, Oral, Q2H PRN, Thomas De Leon MD  •  magnesium hydroxide (MILK OF MAGNESIA) suspension 2400 mg/10mL 10 mL, 10 mL, Oral, Daily PRN, Thomas De Leon MD, 10 mL at 03/06/21 1722  •  melatonin tablet 3 mg, 3 mg, Oral, Nightly PRN, Thomas De Leon MD  •  melatonin tablet 5 mg, 5 mg, Oral, Nightly, Thomas De Leon MD, 5 mg at 03/08/21 2137  •  ondansetron (ZOFRAN) tablet 4 mg, 4 mg, Oral, Q6H PRN, Thomas De Leon MD  •  pantoprazole (PROTONIX) EC tablet 40 mg, 40 mg, Oral, Q PM, Thomas De Leon MD, 40 mg at 03/08/21 1610  •  polyethylene glycol (MIRALAX) packet 17 g, 17 g, Oral, Daily, Thomas De Leon MD, 17 g at 03/09/21 0819  •  rivaroxaban (XARELTO) tablet 20 mg, 20 mg, Oral, Daily, Thomas De Leon MD, 20 mg at 03/09/21 0819  •  sertraline (ZOLOFT) tablet 150 mg, 150 mg, Oral, Daily, Thomas De Leon MD, 150 mg at 03/09/21 0820  •  sodium chloride nasal spray 2 spray, 2 spray, Each Nare, PRN, Thomas De Leon MD    ASSESSMENT & PLAN    Major Depressive Disorder with psychosis, recurrent     -Depression appears to have worsened since patient suffered from COVID this past January, compounded by A. fib diagnosis this past week  - Sertraline 150 mg daily .  - Wellbutrin XL 300 mg daily  - melatonin 5 mg nightly  -ECT      A. Fib  -Recent diagnosis  -Continue Xarelto and aspirin     Prolonged QTC  -Improved for 48  -Appreciate cardiology's involvement   Selection of antidepressant medications finding least offensive agents in regards to prolonged QTC.  Sertraline and bupropion.     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       Special precautions: Special  Precautions Level 3 (q15 min checks)     Behavioral Health Treatment Plan and Problem List: I have reviewed and approved the Behavioral Health Treatment Plan and Problem list.    I spent a total of 26 minutes in direct patient care including  26 minutes face to face with the patient assessment, coordination of care, and counseling the patient on the current and follow-up treatment plans regarding his status and treatment. . Answered patient questions regarding the treatment plan. .    JEAN PAUL De Leon MD    Clinician:  Thomas De Leon MD  03/09/21  08:36 EST    Dictated utilizing Dragon dictation

## 2021-03-09 NOTE — PLAN OF CARE
Goal Outcome Evaluation:  Plan of Care Reviewed With: patient  Progress: no change  Outcome Summary: Patient rates Anx 10 Dep 10 Denies SI, HI, or AVH he seems to be in a more cheerful mood he did smile for first time during assessment. Eats and drinks snacks interacts more with staff and peers than in previous days. Although he still isolates to himself sleeping most of shift uninteruppted

## 2021-03-09 NOTE — PLAN OF CARE
Goal Outcome Evaluation:  Plan of Care Reviewed With: patient  Progress: no change  Outcome Summary: Patient continues to isolate in his room, coming out for meals only. Rates anxiety and depression both 10. Denies SI/HI and CHRISTY. Scheduled for ECT in the am.

## 2021-03-09 NOTE — PLAN OF CARE
Goal Outcome Evaluation:  Plan of Care Reviewed With: patient  Progress: improving  Outcome Summary: Therapist met with Patient to discuss treatment progress and disposition plans; Patient agreeable.    Problem: Adult Behavioral Health Plan of Care  Goal: Plan of Care Review  Outcome: Ongoing, Progressing  Flowsheets (Taken 3/9/2021 1333)  Progress: improving  Plan of Care Reviewed With: patient  Patient Agreement with Plan of Care: agrees  Outcome Summary:   Therapist met with Patient to discuss treatment progress and disposition plans   Patient agreeable.  Goal: Optimized Coping Skills in Response to Life Stressors  Outcome: Ongoing, Progressing  Intervention: Promote Effective Coping Strategies  Flowsheets (Taken 3/9/2021 1333)  Supportive Measures:   active listening utilized   decision-making supported   positive reinforcement provided   verbalization of feelings encouraged  Goal: Develops/Participates in Therapeutic [place] to Support Successful Transition  Outcome: Ongoing, Progressing  Intervention: Foster Therapeutic [place]  Flowsheets (Taken 3/9/2021 1333)  Trust Relationship/Rapport:   care explained   questions encouraged   choices provided   reassurance provided   emotional support provided   thoughts/feelings acknowledged   empathic listening provided   questions answered  Intervention: Mutually Develop Transition Plan  Flowsheets (Taken 3/9/2021 1333)  Transition Support:   community resources reviewed   follow-up care coordinated   crisis management plan promoted   follow-up care discussed   crisis management plan verbalized    1334:    DATA: Therapist met with Patient individually this date. Patient agreeable to discuss current treatment progress and discharge concerns.     CLINICAL MANUVERING/INTERVENTIONS:  Assisted Patient in processing session content; acknowledged and normalized Patient’s thoughts, feelings, and concerns by utilizing a person-centered approach in efforts to build appropriate  "rapport and a positive therapeutic relationship with open and honest communication. Allowed Patient to ventilate regarding current stressors and triggers for negative emotions and thoughts in a safe nonjudgmental environment with unconditional positive regard, active listening skills, and empathy.     ASSESSMENT: Patient was seen today for a follow up. He continues to report feeling anxious and depressed, but has improved. Patient smiled during conversation today for the first time. He reports he has been trying to eat a little better and socialize with the other Patients. Patient reports he spent time in the dayroom yesterday and that made him feel better. Patient largely relates his anxiety and depression to being worried about his physical health. He stated today \"I just know there is something wrong, not mentally, physically.\" He reports he is anxious to go home. We discussed upcoming ECT and Patient remains hopeful he will continue to see improvement. Patient denies SI or any negative thoughts today.     PLAN: Patient will continue stabilization. Patient will continue to receive services offered by Treatment Team.     Patient will follow-up with the [facility].     Assistance with transportation will not be needed. Family member will provide.   "

## 2021-03-10 ENCOUNTER — ANESTHESIA EVENT (OUTPATIENT)
Dept: PERIOP | Facility: HOSPITAL | Age: 71
End: 2021-03-10

## 2021-03-10 ENCOUNTER — ANESTHESIA (OUTPATIENT)
Dept: PERIOP | Facility: HOSPITAL | Age: 71
End: 2021-03-10

## 2021-03-10 PROCEDURE — 90870 ELECTROCONVULSIVE THERAPY: CPT | Performed by: PSYCHIATRY & NEUROLOGY

## 2021-03-10 PROCEDURE — 25010000002 SUCCINYLCHOLINE PER 20 MG: Performed by: NURSE ANESTHETIST, CERTIFIED REGISTERED

## 2021-03-10 PROCEDURE — GZB2ZZZ ELECTROCONVULSIVE THERAPY, BILATERAL-SINGLE SEIZURE: ICD-10-PCS | Performed by: PSYCHIATRY & NEUROLOGY

## 2021-03-10 RX ORDER — OXYCODONE HYDROCHLORIDE AND ACETAMINOPHEN 5; 325 MG/1; MG/1
1 TABLET ORAL ONCE AS NEEDED
Status: COMPLETED | OUTPATIENT
Start: 2021-03-10 | End: 2021-03-10

## 2021-03-10 RX ORDER — SODIUM CHLORIDE 0.9 % (FLUSH) 0.9 %
10 SYRINGE (ML) INJECTION AS NEEDED
Status: DISCONTINUED | OUTPATIENT
Start: 2021-03-10 | End: 2021-03-10 | Stop reason: HOSPADM

## 2021-03-10 RX ORDER — ONDANSETRON 2 MG/ML
4 INJECTION INTRAMUSCULAR; INTRAVENOUS AS NEEDED
Status: DISCONTINUED | OUTPATIENT
Start: 2021-03-10 | End: 2021-03-13

## 2021-03-10 RX ORDER — SUCCINYLCHOLINE CHLORIDE 20 MG/ML
INJECTION INTRAMUSCULAR; INTRAVENOUS AS NEEDED
Status: DISCONTINUED | OUTPATIENT
Start: 2021-03-10 | End: 2021-03-10 | Stop reason: SURG

## 2021-03-10 RX ORDER — MIDAZOLAM HYDROCHLORIDE 1 MG/ML
0.5 INJECTION INTRAMUSCULAR; INTRAVENOUS
Status: DISCONTINUED | OUTPATIENT
Start: 2021-03-10 | End: 2021-03-10 | Stop reason: HOSPADM

## 2021-03-10 RX ORDER — SODIUM CHLORIDE, SODIUM LACTATE, POTASSIUM CHLORIDE, CALCIUM CHLORIDE 600; 310; 30; 20 MG/100ML; MG/100ML; MG/100ML; MG/100ML
125 INJECTION, SOLUTION INTRAVENOUS ONCE
Status: COMPLETED | OUTPATIENT
Start: 2021-03-10 | End: 2021-03-10

## 2021-03-10 RX ORDER — IPRATROPIUM BROMIDE AND ALBUTEROL SULFATE 2.5; .5 MG/3ML; MG/3ML
3 SOLUTION RESPIRATORY (INHALATION) ONCE AS NEEDED
Status: DISCONTINUED | OUTPATIENT
Start: 2021-03-10 | End: 2021-03-13

## 2021-03-10 RX ORDER — SODIUM CHLORIDE 0.9 % (FLUSH) 0.9 %
3 SYRINGE (ML) INJECTION EVERY 12 HOURS SCHEDULED
Status: DISCONTINUED | OUTPATIENT
Start: 2021-03-10 | End: 2021-03-10 | Stop reason: HOSPADM

## 2021-03-10 RX ORDER — MIDAZOLAM HYDROCHLORIDE 1 MG/ML
1 INJECTION INTRAMUSCULAR; INTRAVENOUS
Status: DISCONTINUED | OUTPATIENT
Start: 2021-03-10 | End: 2021-03-10 | Stop reason: HOSPADM

## 2021-03-10 RX ORDER — SODIUM CHLORIDE 0.9 % (FLUSH) 0.9 %
10 SYRINGE (ML) INJECTION EVERY 12 HOURS SCHEDULED
Status: DISCONTINUED | OUTPATIENT
Start: 2021-03-10 | End: 2021-03-10 | Stop reason: HOSPADM

## 2021-03-10 RX ADMIN — AMLODIPINE BESYLATE 10 MG: 10 TABLET ORAL at 07:25

## 2021-03-10 RX ADMIN — ASPIRIN 81 MG: 81 TABLET, COATED ORAL at 09:57

## 2021-03-10 RX ADMIN — Medication 5 MG: at 21:00

## 2021-03-10 RX ADMIN — PANTOPRAZOLE SODIUM 40 MG: 40 TABLET, DELAYED RELEASE ORAL at 16:57

## 2021-03-10 RX ADMIN — ALBUTEROL SULFATE 2 PUFF: 90 AEROSOL, METERED RESPIRATORY (INHALATION) at 21:00

## 2021-03-10 RX ADMIN — CARVEDILOL 25 MG: 25 TABLET, FILM COATED ORAL at 07:25

## 2021-03-10 RX ADMIN — BUPROPION HYDROCHLORIDE 300 MG: 150 TABLET, FILM COATED, EXTENDED RELEASE ORAL at 10:00

## 2021-03-10 RX ADMIN — METHOHEXITAL SODIUM 100 MG: 500 INJECTION, POWDER, LYOPHILIZED, FOR SOLUTION INTRAMUSCULAR; INTRAVENOUS; RECTAL at 08:31

## 2021-03-10 RX ADMIN — SERTRALINE 150 MG: 50 TABLET, FILM COATED ORAL at 10:01

## 2021-03-10 RX ADMIN — CARVEDILOL 25 MG: 25 TABLET, FILM COATED ORAL at 17:44

## 2021-03-10 RX ADMIN — ALBUTEROL SULFATE 2 PUFF: 90 AEROSOL, METERED RESPIRATORY (INHALATION) at 16:57

## 2021-03-10 RX ADMIN — SUCCINYLCHOLINE CHLORIDE 100 MG: 20 INJECTION, SOLUTION INTRAMUSCULAR; INTRAVENOUS at 08:31

## 2021-03-10 RX ADMIN — POLYETHYLENE GLYCOL (3350) 17 G: 17 POWDER, FOR SOLUTION ORAL at 10:17

## 2021-03-10 RX ADMIN — SODIUM CHLORIDE, POTASSIUM CHLORIDE, SODIUM LACTATE AND CALCIUM CHLORIDE 125 ML/HR: 600; 310; 30; 20 INJECTION, SOLUTION INTRAVENOUS at 08:05

## 2021-03-10 RX ADMIN — ALBUTEROL SULFATE 2 PUFF: 90 AEROSOL, METERED RESPIRATORY (INHALATION) at 10:02

## 2021-03-10 RX ADMIN — RIVAROXABAN 20 MG: 20 TABLET, FILM COATED ORAL at 10:16

## 2021-03-10 RX ADMIN — OXYCODONE HYDROCHLORIDE AND ACETAMINOPHEN 1 TABLET: 5; 325 TABLET ORAL at 21:00

## 2021-03-10 NOTE — NURSING NOTE
ECT shock x2 per Dr. De Leon. No seizure activity with first shock. Twenty seven second seizure with second shock.

## 2021-03-10 NOTE — PLAN OF CARE
Goal Outcome Evaluation:  Plan of Care Reviewed With: patient  Progress: improving  Outcome Summary: Therapist met with Patient to discuss treatment progress; Patient agreeable.    Problem: Adult Behavioral Health Plan of Care  Goal: Plan of Care Review  Outcome: Ongoing, Progressing  Flowsheets (Taken 3/10/2021 1357)  Progress: improving  Plan of Care Reviewed With: patient  Patient Agreement with Plan of Care: agrees  Outcome Summary:  • Therapist met with Patient to discuss treatment progress  • Patient agreeable.  Goal: Optimized Coping Skills in Response to Life Stressors  Outcome: Ongoing, Progressing  Intervention: Promote Effective Coping Strategies  Flowsheets (Taken 3/10/2021 1357)  Supportive Measures:  • active listening utilized  • decision-making supported  • positive reinforcement provided  • verbalization of feelings encouraged  Goal: Develops/Participates in Therapeutic [place] to Support Successful Transition  Outcome: Ongoing, Progressing  Intervention: Foster Therapeutic [place]  Flowsheets (Taken 3/10/2021 1357)  Trust Relationship/Rapport:  • care explained  • questions encouraged  • choices provided  • reassurance provided  • emotional support provided  • thoughts/feelings acknowledged  • empathic listening provided  • questions answered  Intervention: Mutually Develop Transition Plan  Flowsheets (Taken 3/10/2021 1357)  Transition Support:  • community resources reviewed  • follow-up care coordinated  • crisis management plan promoted  • follow-up care discussed  • crisis management plan verbalized    1358:    DATA: Therapist met with Patient individually this date. Patient agreeable to discuss current treatment progress and discharge concerns.     Patient received a second ECT today and tolerated the the treatment well.     Therapist spoke with Patient's spouse, Katarzyna on this date and provided an update. She reports Patient calls her daily and she feels like she can hear an improvement in  "his voice. Katarzyna is very supportive.      CLINICAL MANUVERING/INTERVENTIONS:  Assisted Patient in processing session content; acknowledged and normalized Patient’s thoughts, feelings, and concerns by utilizing a person-centered approach in efforts to build appropriate rapport and a positive therapeutic relationship with open and honest communication. Allowed Patient to ventilate regarding current stressors and triggers for negative emotions and thoughts in a safe nonjudgmental environment with unconditional positive regard, active listening skills, and empathy.     ASSESSMENT: Patient was seen today for a follow up. Patient reports feeling \"no good.\" Patient was lethargic and did not engage well in conversation today. He report he has not been out of the bed or eaten anything. Patient reports he believes ECT went well.     PLAN: Patient will continue stabilization. Patient will continue to receive services offered by Treatment Team.     Patient will follow-up with the [facility].     Assistance with transportation will not be needed. Family member will provide.          "

## 2021-03-10 NOTE — PLAN OF CARE
Goal Outcome Evaluation:  Plan of Care Reviewed With: patient  Progress: no change  Outcome Summary: Patient went for ECT today. Tolerated well. Denies SI/HI and CHRISTY.

## 2021-03-10 NOTE — ANESTHESIA POSTPROCEDURE EVALUATION
Patient: Ernesto Easley    Procedure Summary     Date: 03/10/21 Room / Location: [facility] OR  /  [facility] OR    Anesthesia Start: 0824 Anesthesia Stop: 0843    Procedure: ELECTROCONVULSIVE THERAPY (N/A ) Diagnosis:       Severe episode of recurrent major depressive disorder, without psychotic features (CMS/HCC)      (Severe episode of recurrent major depressive disorder, without psychotic features (CMS/HCC) [F33.2])    Surgeons: Thomas De Leon MD Provider: Jn Edge MD    Anesthesia Type: general ASA Status: 2          Anesthesia Type: general    Vitals  Vitals Value Taken Time   /90 03/10/21 0915   Temp 97.8 °F (36.6 °C) 03/10/21 0845   Pulse 87 03/10/21 0915   Resp 14 03/10/21 0915   SpO2 97 % 03/10/21 0915           Post Anesthesia Care and Evaluation    Patient location during evaluation: PHASE II  Patient participation: complete - patient participated  Level of consciousness: awake and alert  Pain score: 7  Pain management: adequate  Airway patency: patent  Anesthetic complications: No anesthetic complications    Cardiovascular status: acceptable  Respiratory status: acceptable  Hydration status: acceptable

## 2021-03-10 NOTE — PLAN OF CARE
Goal Outcome Evaluation:  Plan of Care Reviewed With: patient  Progress: no change  Outcome Summary: Pt isolating to room during shift. Rates anxiety and depression 10/10. Denies SI, HI and AVH. Scheduled ECT in am.

## 2021-03-11 PROCEDURE — 99232 SBSQ HOSP IP/OBS MODERATE 35: CPT | Performed by: PSYCHIATRY & NEUROLOGY

## 2021-03-11 RX ORDER — HYDROCORTISONE 25 MG/G
CREAM TOPICAL 2 TIMES DAILY
Status: DISCONTINUED | OUTPATIENT
Start: 2021-03-11 | End: 2021-03-19 | Stop reason: HOSPADM

## 2021-03-11 RX ORDER — SODIUM CHLORIDE 0.9 % (FLUSH) 0.9 %
10 SYRINGE (ML) INJECTION AS NEEDED
Status: CANCELLED | OUTPATIENT
Start: 2021-03-12

## 2021-03-11 RX ORDER — ARIPIPRAZOLE 2 MG/1
2 TABLET ORAL DAILY
Status: DISCONTINUED | OUTPATIENT
Start: 2021-03-11 | End: 2021-03-16

## 2021-03-11 RX ORDER — SODIUM CHLORIDE 0.9 % (FLUSH) 0.9 %
3 SYRINGE (ML) INJECTION EVERY 12 HOURS SCHEDULED
Status: CANCELLED | OUTPATIENT
Start: 2021-03-12

## 2021-03-11 RX ORDER — SODIUM CHLORIDE 0.9 % (FLUSH) 0.9 %
3 SYRINGE (ML) INJECTION EVERY 12 HOURS SCHEDULED
Status: DISCONTINUED | OUTPATIENT
Start: 2021-03-11 | End: 2021-03-12 | Stop reason: HOSPADM

## 2021-03-11 RX ORDER — SODIUM CHLORIDE 0.9 % (FLUSH) 0.9 %
10 SYRINGE (ML) INJECTION AS NEEDED
Status: DISCONTINUED | OUTPATIENT
Start: 2021-03-11 | End: 2021-03-12 | Stop reason: HOSPADM

## 2021-03-11 RX ADMIN — ALBUTEROL SULFATE 2 PUFF: 90 AEROSOL, METERED RESPIRATORY (INHALATION) at 09:47

## 2021-03-11 RX ADMIN — POLYETHYLENE GLYCOL (3350) 17 G: 17 POWDER, FOR SOLUTION ORAL at 09:50

## 2021-03-11 RX ADMIN — CARVEDILOL 25 MG: 25 TABLET, FILM COATED ORAL at 09:48

## 2021-03-11 RX ADMIN — ALBUTEROL SULFATE 2 PUFF: 90 AEROSOL, METERED RESPIRATORY (INHALATION) at 15:47

## 2021-03-11 RX ADMIN — HYDROCORTISONE 2.5%: 25 CREAM TOPICAL at 12:56

## 2021-03-11 RX ADMIN — AMLODIPINE BESYLATE 10 MG: 10 TABLET ORAL at 09:47

## 2021-03-11 RX ADMIN — ASPIRIN 81 MG: 81 TABLET, COATED ORAL at 09:51

## 2021-03-11 RX ADMIN — RIVAROXABAN 20 MG: 20 TABLET, FILM COATED ORAL at 09:50

## 2021-03-11 RX ADMIN — SERTRALINE 150 MG: 50 TABLET, FILM COATED ORAL at 09:48

## 2021-03-11 RX ADMIN — BUPROPION HYDROCHLORIDE 300 MG: 150 TABLET, FILM COATED, EXTENDED RELEASE ORAL at 09:48

## 2021-03-11 RX ADMIN — ARIPIPRAZOLE 2 MG: 2 TABLET ORAL at 09:48

## 2021-03-11 NOTE — PLAN OF CARE
Goal Outcome Evaluation:  Plan of Care Reviewed With: patient  Progress: improving  Outcome Summary: Thearpist met with Patient to discuss treatment progress; Patient agreeable.    Problem: Adult Behavioral Health Plan of Care  Goal: Plan of Care Review  Outcome: Ongoing, Progressing  Flowsheets (Taken 3/11/2021 0950)  Progress: improving  Plan of Care Reviewed With: patient  Patient Agreement with Plan of Care: agrees  Outcome Summary:   Thearpist met with Patient to discuss treatment progress   Patient agreeable.  Goal: Optimized Coping Skills in Response to Life Stressors  Outcome: Ongoing, Progressing  Intervention: Promote Effective Coping Strategies  Flowsheets (Taken 3/11/2021 0950)  Supportive Measures:   active listening utilized   decision-making supported   positive reinforcement provided   verbalization of feelings encouraged  Goal: Develops/Participates in Therapeutic [place] to Support Successful Transition  Outcome: Ongoing, Progressing  Intervention: Foster Therapeutic [place]  Flowsheets (Taken 3/11/2021 0950)  Trust Relationship/Rapport:   care explained   questions encouraged   choices provided   reassurance provided   emotional support provided   thoughts/feelings acknowledged   empathic listening provided   questions answered  Intervention: Mutually Develop Transition Plan  Flowsheets (Taken 3/11/2021 0950)  Transition Support:   community resources reviewed   follow-up care coordinated   crisis management plan promoted   follow-up care discussed   crisis management plan verbalized    0951:    DATA: Therapist met with Patient individually this date. Patient agreeable to discuss current treatment progress and discharge concerns.     Therapist staffed case with nursing on this date. Patient is scheduled for ECT tomorrow and is refusing to sign consent at this time. Therapist spoke with Patient and highly encouraged it. Patient reports he wants to think about.     CLINICAL  "MANUVERING/INTERVENTIONS:  Assisted Patient in processing session content; acknowledged and normalized Patient’s thoughts, feelings, and concerns by utilizing a person-centered approach in efforts to build appropriate rapport and a positive therapeutic relationship with open and honest communication. Allowed Patient to ventilate regarding current stressors and triggers for negative emotions and thoughts in a safe nonjudgmental environment with unconditional positive regard, active listening skills, and empathy.      ASSESSMENT: Patient was seen today for a follow up. Patient reports he is still feeling \"no good\" and rates both his anxiety and depression a 10/10. Patient appeared irritable and withdrawn this morning. He discussed that he is still worried there is something physically wrong with him like \"cancer or something.\" Patient states he doesn't know if he wants to get ECT tomorrow. Therapist strongly encouraged it and he states he will think about it.     PLAN: Patient will continue stabilization. Patient will continue to receive services offered by Treatment Team.     Patient will follow-up with the [facility].     Assistance with transportation will not be needed. Family member will provide.  "

## 2021-03-11 NOTE — PROGRESS NOTES
"INPATIENT PSYCHIATRIC PROGRESS NOTE    Name:  Ernesto Easley  :  1950  MRN:  [id number]  Visit Number:  [id number]  Length of stay:  13    Behavioral Health Treatment Plan and Problem List: I have reviewed and approved the Behavioral Health Treatment Plan and Problem list.    SUBJECTIVE    CC/ Focus of exam: Depression    Patient's subjective status: \"No good\"    INTERVAL HISTORY: Patient not sleeping not eating convinced he has terminal cancer and that his situation is hopeless.  Denies active suicidal intent or plan but appears very depressed not having a favorable response as yet to either the ECT or psychotropic medications (Wellbutrin and Zoloft).  Wife has talked with nursing stating she thought the patient was not eating because of his hemorrhoids.  When addressed with the patient he stated \"it is more than that\" indicating his thought that he had rectal or colon cancer.  Patient agreeable with continuing with the ECT.    Depression rating 10/10  Anxiety rating 10/10  Sleep: Patient not sleeping       Review of Systems   Respiratory: Negative.    Cardiovascular: Negative.    Gastrointestinal: Negative.    Neurological: Negative.          OBJECTIVE    Temp:  [97.7 °F (36.5 °C)-98.4 °F (36.9 °C)] 98.4 °F (36.9 °C)  Heart Rate:  [] 96  Resp:  [11-20] 20  BP: ()/(51-90) 101/61    MENTAL STATUS EXAM:        Psychomotor: No psychomotor agitation/retardation, No EPS, No motor tics  Speech-normal rate, amount.  Mood/Affect:  Depressed  Thought Processes:  Slow to respond  Thought Content:  dilussional, negativistic and mood congruent  Hallucination(s): auditory  Hopelessness: Yes  Optimistic:No  Suicidal Thoughts:   Yes  Suicidal Plan/Intent:  No  Homicidal Thoughts:  absent  Orientation: oriented x 3  Memory: recent intact    Lab Results (last 24 hours)     ** No results found for the last 24 hours. **           Imaging Results (Last 24 Hours)     ** No results found for the last 24 hours. **       "     ECG/EMG Results (most recent)     Procedure Component Value Units Date/Time    Adult Transthoracic Echo Complete W/ Cont if Necessary Per Protocol [[id number]] Collected: 02/27/21 1308     Updated: 02/27/21 1335     BSA 2.1 m^2      IVSd 0.64 cm      LVIDd 4.4 cm      LVIDs 2.1 cm      LVPWd 0.84 cm      IVS/LVPW 0.77     FS 52.9 %      EDV(Teich) 86.3 ml      ESV(Teich) 13.7 ml      EF(Teich) 84.1 %      EDV(cubed) 83.5 ml      ESV(cubed) 8.7 ml      EF(cubed) 89.5 %      LV mass(C)d 97.9 grams      LV mass(C)dI 47.5 grams/m^2      SV(Teich) 72.6 ml      SI(Teich) 35.2 ml/m^2      SV(cubed) 74.7 ml      SI(cubed) 36.2 ml/m^2      Ao root diam 2.9 cm      Ao root area 6.4 cm^2      ACS 2.0 cm      LA dimension 3.8 cm      LA/Ao 1.3     LVOT diam 2.2 cm      LVOT area 3.8 cm^2      LVOT area(traced) 3.8 cm^2      LVLd ap4 7.1 cm      EDV(MOD-sp4) 66.8 ml      LVLs ap4 6.4 cm      ESV(MOD-sp4) 21.9 ml      EF(MOD-sp4) 67.2 %      SV(MOD-sp4) 44.9 ml      SI(MOD-sp4) 21.8 ml/m^2      Ao root area (BSA corrected) 1.4     LV Thompson Vol (BSA corrected) 32.4 ml/m^2      LV Sys Vol (BSA corrected) 10.6 ml/m^2      MV E max gabe 101.0 cm/sec      MV A max gabe 77.1 cm/sec      MV E/A 1.3     Ao pk gabe 103.0 cm/sec      Ao max PG 4.2 mmHg      Ao V2 mean 83.8 cm/sec      Ao mean PG 3.0 mmHg      Ao V2 VTI 18.0 cm      SV(Ao) 114.8 ml      SI(Ao) 55.7 ml/m^2      PA acc time 0.11 sec      TR max gabe 213.0 cm/sec      RVSP(TR) 28.1 mmHg      RAP systole 10.0 mmHg      PA pr(Accel) 31.3 mmHg       CV ECHO KEVON - BZI_BMI 31.0 kilograms/m^2       CV ECHO KEVON - BSA(HAYCOCK) 2.1 m^2       CV ECHO KEVON - BZI_METRIC_WEIGHT 92.5 kg       CV ECHO KEVON - BZI_METRIC_HEIGHT 172.7 cm      Target HR (85%) 129 bpm      Max. Pred. HR (100%) 152 bpm     Narrative:      · Left ventricular ejection fraction appears to be 51 - 55%. Left   ventricular systolic function is normal.  · Left ventricular diastolic function was indeterminate.  ·  No significant functional valvular abnormalities noted  · There is no evidence of pericardial effusion       ECG 12 Lead [[id number]] Collected: 02/27/21 0153     Updated: 02/27/21 1617     QT Interval 432 ms      QTC Interval 522 ms     Narrative:      Test Reason : Baseline Cardiac Status  Blood Pressure : **/** mmHG  Vent. Rate : 088 BPM     Atrial Rate : 141 BPM     P-R Int : 000 ms          QRS Dur : 102 ms      QT Int : 432 ms       P-R-T Axes : 000 049 -81 degrees     QTc Int : 522 ms    Atrial fibrillation  Nonspecific ST and T wave abnormality  Prolonged QT  Abnormal ECG  When compared with ECG of 26-FEB-2021 18:33, (Unconfirmed)  QT has lengthened  Confirmed by Christopher Aguilar ([id number]) on 2/27/2021 4:17:17 PM    Referred By:             Confirmed By:Christopher Aguilar    ECG 12 Lead [[id number]] Collected: 02/28/21 0721     Updated: 03/03/21 0920     QT Interval 350 ms      QTC Interval 446 ms     Narrative:      Test Reason : qtc monitoring  Blood Pressure : **/** mmHG  Vent. Rate : 098 BPM     Atrial Rate : 441 BPM     P-R Int : 000 ms          QRS Dur : 098 ms      QT Int : 350 ms       P-R-T Axes : 000 046 -82 degrees     QTc Int : 446 ms    Atrial fibrillation  ST & T wave abnormality, consider inferior ischemia  Abnormal ECG  When compared with ECG of 27-FEB-2021 01:53,  QT has shortened  Confirmed by Gabino Napier ([id number]) on 3/3/2021 9:19:51 AM    Referred By:  BAUDILIO           Confirmed By:Gabino Napier    ECG 12 Lead [[id number]] Collected: 03/03/21 1539     Updated: 03/04/21 1822     QT Interval 362 ms      QTC Interval 471 ms     Narrative:      Test Reason : Follow-up monitoring QTC  Blood Pressure : **/** mmHG  Vent. Rate : 102 BPM     Atrial Rate : 093 BPM     P-R Int : 000 ms          QRS Dur : 086 ms      QT Int : 362 ms       P-R-T Axes : 000 050 267 degrees     QTc Int : 471 ms    Atrial fibrillation with rapid ventricular response  ST & T wave abnormality, consider inferolateral  ischemia  Abnormal ECG  When compared with ECG of 28-FEB-2021 07:21,  Inverted T waves have replaced nonspecific T wave abnormality in Lateral  leads  Confirmed by Gabino Napier ([id number]) on 3/4/2021 6:22:03 PM    Referred By:  CYRUS           Confirmed By:Gabino Napier    ECG 12 Lead [[id number]] Collected: 03/06/21 0918     Updated: 03/06/21 1300     QT Interval 364 ms      QTC Interval 440 ms     Narrative:      Test Reason : pre ect workup  Blood Pressure : **/** mmHG  Vent. Rate : 088 BPM     Atrial Rate : 144 BPM     P-R Int : 000 ms          QRS Dur : 088 ms      QT Int : 364 ms       P-R-T Axes : 000 014 -77 degrees     QTc Int : 440 ms    Atrial fibrillation  Nonspecific ST and T wave abnormality  Abnormal ECG  When compared with ECG of 06-MAR-2021 09:17, (Unconfirmed)  No significant change was found  Confirmed by Henry Atkins (2019) on 3/6/2021 1:00:02 PM    Referred By:  CYRUS           Confirmed By:Henry Atkins           ALLERGIES: Patient has no known allergies.      Current Facility-Administered Medications:   •  acetaminophen (TYLENOL) tablet 650 mg, 650 mg, Oral, Q6H PRN, Thomas De Leon MD  •  albuterol sulfate HFA (PROVENTIL HFA;VENTOLIN HFA;PROAIR HFA) inhaler 2 puff, 2 puff, Inhalation, TID, Thomas De Leon MD, 2 puff at 03/10/21 2100  •  ALPRAZolam (XANAX) tablet 0.5 mg, 0.5 mg, Oral, BID PRN, Thomas De Leon MD, 0.5 mg at 03/07/21 1513  •  aluminum-magnesium hydroxide-simethicone (MAALOX MAX) 400-400-40 MG/5ML suspension 15 mL, 15 mL, Oral, Q6H PRN, Thomas De Leon MD  •  amLODIPine (NORVASC) tablet 10 mg, 10 mg, Oral, Q24H, Thomas De Leon MD, 10 mg at 03/10/21 0725  •  aspirin EC tablet 81 mg, 81 mg, Oral, Daily, Thomas De Leon MD, 81 mg at 03/10/21 0957  •  benzonatate (TESSALON) capsule 100 mg, 100 mg, Oral, TID PRN, Thomas De Leon MD  •  bisacodyl (DULCOLAX) EC tablet 5 mg, 5 mg, Oral, Daily PRN, Thomas De Leon  MD [place]  •  buPROPion XL (WELLBUTRIN XL) 24 hr tablet 300 mg, 300 mg, Oral, Daily, Thomas De Leon MD, 300 mg at 03/10/21 1000  •  carvedilol (COREG) tablet 25 mg, 25 mg, Oral, BID With Meals, Thomas De Leon MD, 25 mg at 03/10/21 1744  •  ibuprofen (ADVIL,MOTRIN) tablet 400 mg, 400 mg, Oral, Q6H PRN, Thomas De Leon MD, 400 mg at 03/03/21 2045  •  ipratropium-albuterol (DUO-NEB) nebulizer solution 3 mL, 3 mL, Nebulization, Once PRN, Bridgette Perdomo CRNA  •  loperamide (IMODIUM) capsule 2 mg, 2 mg, Oral, Q2H PRN, Thomas De Leon MD  •  magnesium hydroxide (MILK OF MAGNESIA) suspension 2400 mg/10mL 10 mL, 10 mL, Oral, Daily PRN, Thomas De Leon MD, 10 mL at 03/06/21 1722  •  melatonin tablet 3 mg, 3 mg, Oral, Nightly PRN, Thomas De Leon MD  •  melatonin tablet 5 mg, 5 mg, Oral, Nightly, Thomas De Leon MD, 5 mg at 03/10/21 2100  •  ondansetron (ZOFRAN) injection 4 mg, 4 mg, Intravenous, PRN, Bridgette Perdomo CRNA  •  ondansetron (ZOFRAN) tablet 4 mg, 4 mg, Oral, Q6H PRN, Thomas De Leon MD  •  pantoprazole (PROTONIX) EC tablet 40 mg, 40 mg, Oral, Q PM, Thomas De Leon MD, 40 mg at 03/10/21 1657  •  polyethylene glycol (MIRALAX) packet 17 g, 17 g, Oral, Daily, Thomas De Leon MD, 17 g at 03/10/21 1017  •  rivaroxaban (XARELTO) tablet 20 mg, 20 mg, Oral, Daily, Thomas De Leon MD, 20 mg at 03/10/21 1016  •  sertraline (ZOLOFT) tablet 150 mg, 150 mg, Oral, Daily, Thomas De Leon MD, 150 mg at 03/10/21 1001  •  sodium chloride nasal spray 2 spray, 2 spray, Each Nare, ANJALI, Thomas De Leon MD    ASSESSMENT & PLAN    Major Depressive Disorder with psychosis, recurrent     -Depression appears to have worsened since patient suffered from COVID this past January, compounded by A. fib diagnosis this past week  - Sertraline 150 mg daily .  - Wellbutrin XL 300 mg daily  - melatonin 5 mg  nightly  -ECT      A. Fib  -Recent diagnosis  -Continue Xarelto and aspirin     Prolonged QTC  -Improved for 48  -Appreciate cardiology's involvement   Selection of antidepressant medications finding least offensive agents in regards to prolonged QTC.  Sertraline and bupropion.     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       Special precautions: Special Precautions Level 3 (q15 min checks)     Behavioral Health Treatment Plan and Problem List: I have reviewed and approved the Behavioral Health Treatment Plan and Problem list.    I spent a total of 30 minutes in direct patient care including 20 minutes face to face with the patient assessment, coordination of care, and counseling the patient on the current and follow-up treatment plans regarding his status and treatment plan including ECT.  Patient had no additional questions..    JEAN PAUL De Leon MD    Clinician:  Thomas De Leon MD  03/11/21  08:54 EST    Dictated utilizing Dragon dictation

## 2021-03-11 NOTE — PLAN OF CARE
"Goal Outcome Evaluation:  Plan of Care Reviewed With: patient  Progress: improving  Outcome Summary: Patient calm and cooperative during shift, slept most of the night, interacted with peers some earlier in shift, he rates anxiety 7/10, depression 10/10, reports poor sleep and appetite, denies SI/HI/AVH, reports back pain 10/10, 22g in lt. hand is clean and intact, patient has flat affect and reports feeling \"not so good\".  "

## 2021-03-11 NOTE — PLAN OF CARE
Goal Outcome Evaluation:  Plan of Care Reviewed With: patient  Progress: no change  Outcome Summary: patient refused bkfst, lunch but staff took ice cream cup in and he ate it.  has been sitting on side of bed all day but mood very flat and mumbles at times. trying to get patient to shave but he has yet to do it with staff watching.  anxiety/depression 10 denies s/i but took awhile to answer this question.

## 2021-03-12 ENCOUNTER — ANESTHESIA (OUTPATIENT)
Dept: PERIOP | Facility: HOSPITAL | Age: 71
End: 2021-03-12

## 2021-03-12 ENCOUNTER — ANESTHESIA EVENT (OUTPATIENT)
Dept: PERIOP | Facility: HOSPITAL | Age: 71
End: 2021-03-12

## 2021-03-12 LAB
ANION GAP SERPL CALCULATED.3IONS-SCNC: 10 MMOL/L (ref 5–15)
BUN SERPL-MCNC: 15 MG/DL (ref 8–23)
BUN/CREAT SERPL: 12.8 (ref 7–25)
CALCIUM SPEC-SCNC: 9.4 MG/DL (ref 8.6–10.5)
CHLORIDE SERPL-SCNC: 107 MMOL/L (ref 98–107)
CO2 SERPL-SCNC: 27 MMOL/L (ref 22–29)
CREAT SERPL-MCNC: 1.17 MG/DL (ref 0.76–1.27)
GFR SERPL CREATININE-BSD FRML MDRD: 62 ML/MIN/1.73
GLUCOSE SERPL-MCNC: 82 MG/DL (ref 65–99)
POTASSIUM SERPL-SCNC: 3.8 MMOL/L (ref 3.5–5.2)
SODIUM SERPL-SCNC: 144 MMOL/L (ref 136–145)

## 2021-03-12 PROCEDURE — 90870 ELECTROCONVULSIVE THERAPY: CPT | Performed by: PSYCHIATRY & NEUROLOGY

## 2021-03-12 PROCEDURE — 25010000003 LIDOCAINE 1 % SOLUTION: Performed by: NURSE ANESTHETIST, CERTIFIED REGISTERED

## 2021-03-12 PROCEDURE — GZB3ZZZ ELECTROCONVULSIVE THERAPY, BILATERAL-MULTIPLE SEIZURE: ICD-10-PCS | Performed by: PSYCHIATRY & NEUROLOGY

## 2021-03-12 PROCEDURE — 25010000002 MIDAZOLAM PER 1 MG: Performed by: ANESTHESIOLOGY

## 2021-03-12 PROCEDURE — 25010000002 SUCCINYLCHOLINE PER 20 MG: Performed by: NURSE ANESTHETIST, CERTIFIED REGISTERED

## 2021-03-12 PROCEDURE — 80048 BASIC METABOLIC PNL TOTAL CA: CPT | Performed by: PSYCHIATRY & NEUROLOGY

## 2021-03-12 RX ORDER — SODIUM CHLORIDE 0.9 % (FLUSH) 0.9 %
10 SYRINGE (ML) INJECTION EVERY 12 HOURS SCHEDULED
Status: DISCONTINUED | OUTPATIENT
Start: 2021-03-12 | End: 2021-03-12 | Stop reason: HOSPADM

## 2021-03-12 RX ORDER — SUCCINYLCHOLINE CHLORIDE 20 MG/ML
INJECTION INTRAMUSCULAR; INTRAVENOUS AS NEEDED
Status: DISCONTINUED | OUTPATIENT
Start: 2021-03-12 | End: 2021-03-12 | Stop reason: SURG

## 2021-03-12 RX ORDER — MIDAZOLAM HYDROCHLORIDE 1 MG/ML
1 INJECTION INTRAMUSCULAR; INTRAVENOUS
Status: DISCONTINUED | OUTPATIENT
Start: 2021-03-12 | End: 2021-03-12 | Stop reason: HOSPADM

## 2021-03-12 RX ORDER — SODIUM CHLORIDE, SODIUM LACTATE, POTASSIUM CHLORIDE, CALCIUM CHLORIDE 600; 310; 30; 20 MG/100ML; MG/100ML; MG/100ML; MG/100ML
100 INJECTION, SOLUTION INTRAVENOUS ONCE AS NEEDED
Status: DISCONTINUED | OUTPATIENT
Start: 2021-03-12 | End: 2021-03-13

## 2021-03-12 RX ORDER — MEPERIDINE HYDROCHLORIDE 25 MG/ML
12.5 INJECTION INTRAMUSCULAR; INTRAVENOUS; SUBCUTANEOUS
Status: DISCONTINUED | OUTPATIENT
Start: 2021-03-12 | End: 2021-03-13

## 2021-03-12 RX ORDER — SODIUM CHLORIDE 0.9 % (FLUSH) 0.9 %
10 SYRINGE (ML) INJECTION AS NEEDED
Status: DISCONTINUED | OUTPATIENT
Start: 2021-03-12 | End: 2021-03-12 | Stop reason: HOSPADM

## 2021-03-12 RX ORDER — MIDAZOLAM HYDROCHLORIDE 1 MG/ML
0.5 INJECTION INTRAMUSCULAR; INTRAVENOUS
Status: DISCONTINUED | OUTPATIENT
Start: 2021-03-12 | End: 2021-03-12 | Stop reason: HOSPADM

## 2021-03-12 RX ORDER — FENTANYL CITRATE 50 UG/ML
50 INJECTION, SOLUTION INTRAMUSCULAR; INTRAVENOUS
Status: DISCONTINUED | OUTPATIENT
Start: 2021-03-12 | End: 2021-03-13

## 2021-03-12 RX ORDER — SODIUM CHLORIDE, SODIUM LACTATE, POTASSIUM CHLORIDE, CALCIUM CHLORIDE 600; 310; 30; 20 MG/100ML; MG/100ML; MG/100ML; MG/100ML
INJECTION, SOLUTION INTRAVENOUS CONTINUOUS PRN
Status: DISCONTINUED | OUTPATIENT
Start: 2021-03-12 | End: 2021-03-12 | Stop reason: SURG

## 2021-03-12 RX ORDER — ONDANSETRON 2 MG/ML
4 INJECTION INTRAMUSCULAR; INTRAVENOUS AS NEEDED
Status: DISCONTINUED | OUTPATIENT
Start: 2021-03-12 | End: 2021-03-13

## 2021-03-12 RX ORDER — IPRATROPIUM BROMIDE AND ALBUTEROL SULFATE 2.5; .5 MG/3ML; MG/3ML
3 SOLUTION RESPIRATORY (INHALATION) ONCE AS NEEDED
Status: DISCONTINUED | OUTPATIENT
Start: 2021-03-12 | End: 2021-03-13

## 2021-03-12 RX ORDER — DROPERIDOL 2.5 MG/ML
0.62 INJECTION, SOLUTION INTRAMUSCULAR; INTRAVENOUS ONCE AS NEEDED
Status: DISCONTINUED | OUTPATIENT
Start: 2021-03-12 | End: 2021-03-13

## 2021-03-12 RX ORDER — SODIUM CHLORIDE, SODIUM LACTATE, POTASSIUM CHLORIDE, CALCIUM CHLORIDE 600; 310; 30; 20 MG/100ML; MG/100ML; MG/100ML; MG/100ML
125 INJECTION, SOLUTION INTRAVENOUS ONCE
Status: DISCONTINUED | OUTPATIENT
Start: 2021-03-12 | End: 2021-03-12 | Stop reason: HOSPADM

## 2021-03-12 RX ORDER — OXYCODONE HYDROCHLORIDE AND ACETAMINOPHEN 5; 325 MG/1; MG/1
1 TABLET ORAL ONCE AS NEEDED
Status: DISCONTINUED | OUTPATIENT
Start: 2021-03-12 | End: 2021-03-13

## 2021-03-12 RX ORDER — LIDOCAINE HYDROCHLORIDE 10 MG/ML
INJECTION, SOLUTION INFILTRATION; PERINEURAL AS NEEDED
Status: DISCONTINUED | OUTPATIENT
Start: 2021-03-12 | End: 2021-03-12 | Stop reason: SURG

## 2021-03-12 RX ADMIN — ALBUTEROL SULFATE 2 PUFF: 90 AEROSOL, METERED RESPIRATORY (INHALATION) at 21:20

## 2021-03-12 RX ADMIN — HYDROCORTISONE 2.5%: 25 CREAM TOPICAL at 10:54

## 2021-03-12 RX ADMIN — LIDOCAINE HYDROCHLORIDE 60 MG: 10 INJECTION, SOLUTION INFILTRATION; PERINEURAL at 09:51

## 2021-03-12 RX ADMIN — HYDROCORTISONE 2.5%: 25 CREAM TOPICAL at 21:20

## 2021-03-12 RX ADMIN — PANTOPRAZOLE SODIUM 40 MG: 40 TABLET, DELAYED RELEASE ORAL at 16:20

## 2021-03-12 RX ADMIN — Medication 5 MG: at 21:20

## 2021-03-12 RX ADMIN — CARVEDILOL 25 MG: 25 TABLET, FILM COATED ORAL at 17:41

## 2021-03-12 RX ADMIN — ARIPIPRAZOLE 2 MG: 2 TABLET ORAL at 08:57

## 2021-03-12 RX ADMIN — SODIUM CHLORIDE, POTASSIUM CHLORIDE, SODIUM LACTATE AND CALCIUM CHLORIDE: 600; 310; 30; 20 INJECTION, SOLUTION INTRAVENOUS at 09:44

## 2021-03-12 RX ADMIN — BUPROPION HYDROCHLORIDE 300 MG: 150 TABLET, FILM COATED, EXTENDED RELEASE ORAL at 10:55

## 2021-03-12 RX ADMIN — POLYETHYLENE GLYCOL (3350) 17 G: 17 POWDER, FOR SOLUTION ORAL at 10:54

## 2021-03-12 RX ADMIN — MIDAZOLAM HYDROCHLORIDE 2 MG: 1 INJECTION, SOLUTION INTRAMUSCULAR; INTRAVENOUS at 10:01

## 2021-03-12 RX ADMIN — SUCCINYLCHOLINE CHLORIDE 100 MG: 20 INJECTION, SOLUTION INTRAMUSCULAR; INTRAVENOUS at 09:51

## 2021-03-12 RX ADMIN — AMLODIPINE BESYLATE 10 MG: 10 TABLET ORAL at 08:52

## 2021-03-12 RX ADMIN — CARVEDILOL 25 MG: 25 TABLET, FILM COATED ORAL at 08:52

## 2021-03-12 RX ADMIN — ALBUTEROL SULFATE 2 PUFF: 90 AEROSOL, METERED RESPIRATORY (INHALATION) at 16:20

## 2021-03-12 RX ADMIN — ACETAMINOPHEN 650 MG: 325 TABLET ORAL at 21:20

## 2021-03-12 RX ADMIN — RIVAROXABAN 20 MG: 20 TABLET, FILM COATED ORAL at 08:53

## 2021-03-12 RX ADMIN — SERTRALINE 150 MG: 50 TABLET, FILM COATED ORAL at 08:57

## 2021-03-12 RX ADMIN — ALBUTEROL SULFATE 2 PUFF: 90 AEROSOL, METERED RESPIRATORY (INHALATION) at 10:53

## 2021-03-12 RX ADMIN — SODIUM CHLORIDE, PRESERVATIVE FREE 3 ML: 5 INJECTION INTRAVENOUS at 08:54

## 2021-03-12 RX ADMIN — ASPIRIN 81 MG: 81 TABLET, COATED ORAL at 08:53

## 2021-03-12 RX ADMIN — METHOHEXITAL SODIUM 100 MG: 500 INJECTION, POWDER, LYOPHILIZED, FOR SOLUTION INTRAMUSCULAR; INTRAVENOUS; RECTAL at 09:51

## 2021-03-12 NOTE — PLAN OF CARE
Goal Outcome Evaluation:  Plan of Care Reviewed With: patient  Progress: improving  Outcome Summary: patient has been out of since later this afternoon.  eating better 75% and has been out of his room talking to other patients on unit.

## 2021-03-12 NOTE — PLAN OF CARE
Goal Outcome Evaluation:  Plan of Care Reviewed With: patient  Progress: no change  Outcome Summary: Patient reluctant to participate in assessment rates Anx 0 Dep 0 Denies SI, HI, or AVH reports good appetite and sleep pattern but just hasn't done much of either

## 2021-03-12 NOTE — ANESTHESIA PREPROCEDURE EVALUATION
Anesthesia Evaluation     Patient summary reviewed and Nursing notes reviewed   no history of anesthetic complications:  NPO Solid Status: > 8 hours  NPO Liquid Status: > 8 hours           Airway   Mallampati: II  TM distance: >3 FB  Neck ROM: full  No difficulty expected  Dental    (+) upper dentures    Pulmonary - negative pulmonary ROS and normal exam   Cardiovascular - normal exam  Exercise tolerance: good (4-7 METS)    NYHA Classification: II    (+) hypertension, dysrhythmias Atrial Fib,       Neuro/Psych- negative ROS  GI/Hepatic/Renal/Endo    (+)  PUD,      Musculoskeletal (-) negative ROS    Abdominal  - normal exam   Substance History - negative use     OB/GYN negative ob/gyn ROS         Other - negative ROS                         Anesthesia Plan    ASA 2     general     intravenous induction     Anesthetic plan, all risks, benefits, and alternatives have been provided, discussed and informed consent has been obtained with: patient.

## 2021-03-12 NOTE — PROGRESS NOTES
Patient initially refusing prepped for ECT this morning.  After meeting with him and discussing the pros and cons he hesitantly agrees to proceed.  Mauricio basic metabolic panel this morning normal results.

## 2021-03-12 NOTE — ANESTHESIA POSTPROCEDURE EVALUATION
Patient: Ernesto Easley    Procedure Summary     Date: 03/12/21 Room / Location: [facility] OR  /  [facility] OR    Anesthesia Start: 0944 Anesthesia Stop: 1006    Procedure: ELECTROCONVULSIVE THERAPY (N/A ) Diagnosis:       Severe episode of recurrent major depressive disorder, without psychotic features (CMS/HCC)      (Severe episode of recurrent major depressive disorder, without psychotic features (CMS/HCC) [F33.2])    Surgeons: Thomas De Leon MD Provider: Jn Edge MD    Anesthesia Type: general ASA Status: 2          Anesthesia Type: general    Vitals  Vitals Value Taken Time   /74 03/12/21 1034   Temp 98.2 °F (36.8 °C) 03/12/21 1007   Pulse 87 03/12/21 1034   Resp 14 03/12/21 1034   SpO2 95 % 03/12/21 1034           Post Anesthesia Care and Evaluation    Patient location during evaluation: bedside  Patient participation: complete - patient participated  Level of consciousness: awake and alert  Pain score: 1  Pain management: adequate  Airway patency: patent  Anesthetic complications: No anesthetic complications  PONV Status: none  Cardiovascular status: acceptable  Respiratory status: acceptable  Hydration status: acceptable

## 2021-03-12 NOTE — PLAN OF CARE
Goal Outcome Evaluation:  Plan of Care Reviewed With: patient  Progress: improving  Outcome Summary: Therapist met with Patient to discuss treatment progress and disposition plans; Patient agreeable.    Problem: Adult Behavioral Health Plan of Care  Goal: Plan of Care Review  Outcome: Ongoing, Progressing  Flowsheets (Taken 3/12/2021 1527)  Progress: improving  Plan of Care Reviewed With: patient  Patient Agreement with Plan of Care: agrees  Outcome Summary:  • Therapist met with Patient to discuss treatment progress and disposition plans  • Patient agreeable.  Goal: Optimized Coping Skills in Response to Life Stressors  Outcome: Ongoing, Progressing  Intervention: Promote Effective Coping Strategies  Flowsheets (Taken 3/12/2021 1540)  Supportive Measures:  • active listening utilized  • decision-making supported  • positive reinforcement provided  • verbalization of feelings encouraged  Goal: Develops/Participates in Therapeutic [place] to Support Successful Transition  Outcome: Ongoing, Progressing  Intervention: Foster Therapeutic [place]  Flowsheets (Taken 3/12/2021 1540)  Trust Relationship/Rapport:  • care explained  • questions encouraged  • reassurance provided  • choices provided  • emotional support provided  • thoughts/feelings acknowledged  • empathic listening provided  • questions answered  Intervention: Mutually Develop Transition Plan  Flowsheets (Taken 3/12/2021 1540)  Transition Support:  • community resources reviewed  • follow-up care coordinated  • follow-up care discussed  • crisis management plan promoted  • crisis management plan verbalized    1540:    DATA: Therapist met with Patient individually this date. Patient agreeable to discuss current treatment progress and discharge concerns.     CLINICAL MANUVERING/INTERVENTIONS:  Assisted Patient in processing session content; acknowledged and normalized Patient’s thoughts, feelings, and concerns by utilizing a person-centered approach in  "efforts to build appropriate rapport and a positive therapeutic relationship with open and honest communication. Allowed Patient to ventilate regarding current stressors and triggers for negative emotions and thoughts in a safe nonjudgmental environment with unconditional positive regard, active listening skills, and empathy.     ASSESSMENT: Patient was seen today for a follow up. He reports he is feeling \"a tad\" better but states he wishes we would just \"let him go home and die in peace\" referring to him believing he has terminal colorectal cancer. Therapist attempted to reassure Patient and he stated \"I know what's going on and you know what's going, we all know.\" Patient states he feels like we are just letting him lay here and die. He did report he went out to the dayroom today and watched TV. We discussed ECT and Patient states it went well but he would prefer to go home. Patient remains withdrawn and depressed.     PLAN: Patient will continue stabilization. Patient will continue to receive services offered by Treatment Team.     Patient will follow-up with the [facility].     Assistance with transportation will not be needed. Family member will provide.   "

## 2021-03-13 PROCEDURE — 99232 SBSQ HOSP IP/OBS MODERATE 35: CPT | Performed by: PSYCHIATRY & NEUROLOGY

## 2021-03-13 RX ADMIN — ALBUTEROL SULFATE 2 PUFF: 90 AEROSOL, METERED RESPIRATORY (INHALATION) at 20:52

## 2021-03-13 RX ADMIN — PANTOPRAZOLE SODIUM 40 MG: 40 TABLET, DELAYED RELEASE ORAL at 16:13

## 2021-03-13 RX ADMIN — ALBUTEROL SULFATE 2 PUFF: 90 AEROSOL, METERED RESPIRATORY (INHALATION) at 08:08

## 2021-03-13 RX ADMIN — RIVAROXABAN 20 MG: 20 TABLET, FILM COATED ORAL at 08:09

## 2021-03-13 RX ADMIN — HYDROCORTISONE 2.5% 1 APPLICATION: 25 CREAM TOPICAL at 20:52

## 2021-03-13 RX ADMIN — CARVEDILOL 25 MG: 25 TABLET, FILM COATED ORAL at 08:08

## 2021-03-13 RX ADMIN — BUPROPION HYDROCHLORIDE 300 MG: 150 TABLET, FILM COATED, EXTENDED RELEASE ORAL at 08:10

## 2021-03-13 RX ADMIN — ASPIRIN 81 MG: 81 TABLET, COATED ORAL at 08:09

## 2021-03-13 RX ADMIN — SERTRALINE 150 MG: 50 TABLET, FILM COATED ORAL at 08:10

## 2021-03-13 RX ADMIN — Medication 5 MG: at 20:51

## 2021-03-13 RX ADMIN — ARIPIPRAZOLE 2 MG: 2 TABLET ORAL at 08:10

## 2021-03-13 RX ADMIN — HYDROCORTISONE 2.5%: 25 CREAM TOPICAL at 08:09

## 2021-03-13 RX ADMIN — POLYETHYLENE GLYCOL (3350) 17 G: 17 POWDER, FOR SOLUTION ORAL at 08:08

## 2021-03-13 RX ADMIN — MAGNESIUM HYDROXIDE 10 ML: 2400 SUSPENSION ORAL at 16:24

## 2021-03-13 RX ADMIN — ALBUTEROL SULFATE 2 PUFF: 90 AEROSOL, METERED RESPIRATORY (INHALATION) at 15:05

## 2021-03-13 NOTE — PLAN OF CARE
Goal Outcome Evaluation:  Plan of Care Reviewed With: patient  Progress: no change  Outcome Summary: Patient calm and cooperative. Patient has been withdrawn to room and avoids social interaction. Rates anxiety and depression both a 10. Denies SI/HI or AVH.

## 2021-03-13 NOTE — PROGRESS NOTES
"      Inpatient Psych Progress Note     Clinician: Tej De Leon MD  Admission Date: 2/26/2021  13:37 EST 03/13/21    Behavioral Health Treatment Plan and Problem List: I have reviewed and approved the Behavioral Health Treatment Plan and Problem list.    Allergies  No Known Allergies    Hospital Day: 15 days      Assessment completed within view of staff    History  CC/clinical focus: Major depressive disorder    Interval HPI: Patient seen and evaluated by me.  Chart reviewed.  Patient continues to complain of significant depressed mood.  He feels very anxious today.  He denies suicidal thoughts this morning. Med Compliant.  Reports some constipation and difficulty urinating this morning.  ROS otherwise as below.      Interval Review of Systems:   General ROS: negative for - fever or malaise  Endocrine ROS: negative for - palpitations  Respiratory ROS: no cough, shortness of breath, or wheezing  Cardiovascular ROS: no chest pain or dyspnea on exertion  Gastrointestinal ROS: no abdominal pain,no black or bloody stools.  + Constipation    BP 91/55 (BP Location: Right arm, Patient Position: Sitting)   Pulse 85   Temp 97.5 °F (36.4 °C) (Temporal)   Resp 18   Ht 172.7 cm (68\")   Wt 91.7 kg (202 lb 3.2 oz)   SpO2 96%   BMI 30.74 kg/m²     Mental Status Exam  Mood: depressed  Affect: mood-congruent   Thought Processes: linear, logical, and goal directed  Thought Content: negativistic  Hallucinations: no  Suicidal Thoughts: denies  Suicidal Plan/Intent: denies  Hopelesness:Severe  Homicidal Thoughts:  denies      Medical Decision Making:   Labs:     Lab Results (last 24 hours)     ** No results found for the last 24 hours. **            Radiology:     Imaging Results (Last 24 Hours)     ** No results found for the last 24 hours. **            EKG:     ECG/EMG Results (most recent)     Procedure Component Value Units Date/Time    Adult Transthoracic Echo Complete W/ Cont if Necessary Per Protocol [[id number]] " Collected: 02/27/21 1308     Updated: 02/27/21 1335     BSA 2.1 m^2      IVSd 0.64 cm      LVIDd 4.4 cm      LVIDs 2.1 cm      LVPWd 0.84 cm      IVS/LVPW 0.77     FS 52.9 %      EDV(Teich) 86.3 ml      ESV(Teich) 13.7 ml      EF(Teich) 84.1 %      EDV(cubed) 83.5 ml      ESV(cubed) 8.7 ml      EF(cubed) 89.5 %      LV mass(C)d 97.9 grams      LV mass(C)dI 47.5 grams/m^2      SV(Teich) 72.6 ml      SI(Teich) 35.2 ml/m^2      SV(cubed) 74.7 ml      SI(cubed) 36.2 ml/m^2      Ao root diam 2.9 cm      Ao root area 6.4 cm^2      ACS 2.0 cm      LA dimension 3.8 cm      LA/Ao 1.3     LVOT diam 2.2 cm      LVOT area 3.8 cm^2      LVOT area(traced) 3.8 cm^2      LVLd ap4 7.1 cm      EDV(MOD-sp4) 66.8 ml      LVLs ap4 6.4 cm      ESV(MOD-sp4) 21.9 ml      EF(MOD-sp4) 67.2 %      SV(MOD-sp4) 44.9 ml      SI(MOD-sp4) 21.8 ml/m^2      Ao root area (BSA corrected) 1.4     LV Thompson Vol (BSA corrected) 32.4 ml/m^2      LV Sys Vol (BSA corrected) 10.6 ml/m^2      MV E max gabe 101.0 cm/sec      MV A max gabe 77.1 cm/sec      MV E/A 1.3     Ao pk gabe 103.0 cm/sec      Ao max PG 4.2 mmHg      Ao V2 mean 83.8 cm/sec      Ao mean PG 3.0 mmHg      Ao V2 VTI 18.0 cm      SV(Ao) 114.8 ml      SI(Ao) 55.7 ml/m^2      PA acc time 0.11 sec      TR max gabe 213.0 cm/sec      RVSP(TR) 28.1 mmHg      RAP systole 10.0 mmHg      PA pr(Accel) 31.3 mmHg      BH CV ECHO KEVON - BZI_BMI 31.0 kilograms/m^2      BH CV ECHO KEVON - BSA(HAYCOCK) 2.1 m^2       CV ECHO KEVON - BZI_METRIC_WEIGHT 92.5 kg       CV ECHO KEVON - BZI_METRIC_HEIGHT 172.7 cm      Target HR (85%) 129 bpm      Max. Pred. HR (100%) 152 bpm     Narrative:      · Left ventricular ejection fraction appears to be 51 - 55%. Left   ventricular systolic function is normal.  · Left ventricular diastolic function was indeterminate.  · No significant functional valvular abnormalities noted  · There is no evidence of pericardial effusion       ECG 12 Lead [[id number]] Collected: 02/27/21 0153      Updated: 02/27/21 1617     QT Interval 432 ms      QTC Interval 522 ms     Narrative:      Test Reason : Baseline Cardiac Status  Blood Pressure : **/** mmHG  Vent. Rate : 088 BPM     Atrial Rate : 141 BPM     P-R Int : 000 ms          QRS Dur : 102 ms      QT Int : 432 ms       P-R-T Axes : 000 049 -81 degrees     QTc Int : 522 ms    Atrial fibrillation  Nonspecific ST and T wave abnormality  Prolonged QT  Abnormal ECG  When compared with ECG of 26-FEB-2021 18:33, (Unconfirmed)  QT has lengthened  Confirmed by Christopher Aguilar (2020) on 2/27/2021 4:17:17 PM    Referred By:             Confirmed By:Christopher Aguilar    ECG 12 Lead [[id number]] Collected: 02/28/21 0721     Updated: 03/03/21 0920     QT Interval 350 ms      QTC Interval 446 ms     Narrative:      Test Reason : qtc monitoring  Blood Pressure : **/** mmHG  Vent. Rate : 098 BPM     Atrial Rate : 441 BPM     P-R Int : 000 ms          QRS Dur : 098 ms      QT Int : 350 ms       P-R-T Axes : 000 046 -82 degrees     QTc Int : 446 ms    Atrial fibrillation  ST & T wave abnormality, consider inferior ischemia  Abnormal ECG  When compared with ECG of 27-FEB-2021 01:53,  QT has shortened  Confirmed by Gabino Napier ([id number]) on 3/3/2021 9:19:51 AM    Referred By:  BAUDILIO           Confirmed By:Gabino Napier    ECG 12 Lead [[id number]] Collected: 03/03/21 1539     Updated: 03/04/21 1822     QT Interval 362 ms      QTC Interval 471 ms     Narrative:      Test Reason : Follow-up monitoring QTC  Blood Pressure : **/** mmHG  Vent. Rate : 102 BPM     Atrial Rate : 093 BPM     P-R Int : 000 ms          QRS Dur : 086 ms      QT Int : 362 ms       P-R-T Axes : 000 050 267 degrees     QTc Int : 471 ms    Atrial fibrillation with rapid ventricular response  ST & T wave abnormality, consider inferolateral ischemia  Abnormal ECG  When compared with ECG of 28-FEB-2021 07:21,  Inverted T waves have replaced nonspecific T wave abnormality in Lateral  leads  Confirmed by Karthikeyan  Gabino (2001) on 3/4/2021 6:22:03 PM    Referred By:  CYRUS           Confirmed By:Gabino Karthikeyan    ECG 12 Lead [[id number]] Collected: 03/06/21 0918     Updated: 03/06/21 1300     QT Interval 364 ms      QTC Interval 440 ms     Narrative:      Test Reason : pre ect workup  Blood Pressure : **/** mmHG  Vent. Rate : 088 BPM     Atrial Rate : 144 BPM     P-R Int : 000 ms          QRS Dur : 088 ms      QT Int : 364 ms       P-R-T Axes : 000 014 -77 degrees     QTc Int : 440 ms    Atrial fibrillation  Nonspecific ST and T wave abnormality  Abnormal ECG  When compared with ECG of 06-MAR-2021 09:17, (Unconfirmed)  No significant change was found  Confirmed by Henry Atkins (2019) on 3/6/2021 1:00:02 PM    Referred By:  CYRUS           Confirmed By:Henry Atkins           Medications:  albuterol sulfate HFA, 2 puff, Inhalation, TID  amLODIPine, 10 mg, Oral, Q24H  ARIPiprazole, 2 mg, Oral, Daily  aspirin, 81 mg, Oral, Daily  buPROPion XL, 300 mg, Oral, Daily  carvedilol, 25 mg, Oral, BID With Meals  Hydrocortisone (Perianal), , Rectal, BID  melatonin, 5 mg, Oral, Nightly  pantoprazole, 40 mg, Oral, Q PM  polyethylene glycol, 17 g, Oral, Daily  rivaroxaban, 20 mg, Oral, Daily  sertraline, 150 mg, Oral, Daily           All medications reviewed.      Assessment and Plan:    Major Depressive Disorder with psychosis, recurrent  -Depression appears to have worsened since patient suffered from COVID this past January, compounded by JUAN ANTONIO [place] diagnosis this past week  - Sertraline 150 mg daily .  - Wellbutrin XL 300 mg daily  - melatonin 5 mg nightly  -Continue ECT treatments     JUAN ANTONIO [place]  -Recent diagnosis  -Continue Xarelto and aspirin     Prolonged QTC  -Improved for 46  -Appreciate cardiology's involvement   Selection of antidepressant medications finding least offensive agents in regards to prolonged QTC.  Sertraline and bupropion.     Hypertension  -Continue Coreg 25 mg twice daily  -Continue Norvasc 10 mg daily  -Continue losartan  50 mg/HCTZ 12.5 mg daily     Post COVID pulmonary residual and nodule on recent CT of the chest  Pulmonary consult.        If complaint of constipation and difficulty urinating persist we will need to evaluate further.        Continue hospitalization for safety and stabilization.  Continue current level of Special Precautions (q15 minute checks).

## 2021-03-13 NOTE — PLAN OF CARE
"Goal Outcome Evaluation:  Plan of Care Reviewed With: patient  Progress: improving  Outcome Summary: Patient cooperative, still withdrawn to room, has flat affect, rates anxiety and depression 10/10, denies SI/HI/AVH, reports poor appetite and sleep, states \"I feel about the same, everything's bad\", IV is clean, dry and intact during assessment.  "

## 2021-03-14 PROCEDURE — 99232 SBSQ HOSP IP/OBS MODERATE 35: CPT | Performed by: PSYCHIATRY & NEUROLOGY

## 2021-03-14 RX ORDER — SODIUM CHLORIDE 0.9 % (FLUSH) 0.9 %
10 SYRINGE (ML) INJECTION AS NEEDED
Status: DISCONTINUED | OUTPATIENT
Start: 2021-03-14 | End: 2021-03-15 | Stop reason: HOSPADM

## 2021-03-14 RX ORDER — SODIUM CHLORIDE 0.9 % (FLUSH) 0.9 %
3 SYRINGE (ML) INJECTION EVERY 12 HOURS SCHEDULED
Status: DISCONTINUED | OUTPATIENT
Start: 2021-03-14 | End: 2021-03-15 | Stop reason: HOSPADM

## 2021-03-14 RX ADMIN — ARIPIPRAZOLE 2 MG: 2 TABLET ORAL at 08:11

## 2021-03-14 RX ADMIN — SERTRALINE 150 MG: 50 TABLET, FILM COATED ORAL at 08:11

## 2021-03-14 RX ADMIN — ALBUTEROL SULFATE 2 PUFF: 90 AEROSOL, METERED RESPIRATORY (INHALATION) at 15:27

## 2021-03-14 RX ADMIN — HYDROCORTISONE 2.5%: 25 CREAM TOPICAL at 08:10

## 2021-03-14 RX ADMIN — Medication 5 MG: at 21:03

## 2021-03-14 RX ADMIN — ALPRAZOLAM 0.5 MG: 0.5 TABLET ORAL at 21:03

## 2021-03-14 RX ADMIN — RIVAROXABAN 20 MG: 20 TABLET, FILM COATED ORAL at 08:09

## 2021-03-14 RX ADMIN — BUPROPION HYDROCHLORIDE 300 MG: 150 TABLET, FILM COATED, EXTENDED RELEASE ORAL at 08:11

## 2021-03-14 RX ADMIN — CARVEDILOL 25 MG: 25 TABLET, FILM COATED ORAL at 08:09

## 2021-03-14 RX ADMIN — ASPIRIN 81 MG: 81 TABLET, COATED ORAL at 08:09

## 2021-03-14 RX ADMIN — ALBUTEROL SULFATE 2 PUFF: 90 AEROSOL, METERED RESPIRATORY (INHALATION) at 08:09

## 2021-03-14 RX ADMIN — POLYETHYLENE GLYCOL (3350) 17 G: 17 POWDER, FOR SOLUTION ORAL at 08:08

## 2021-03-14 RX ADMIN — AMLODIPINE BESYLATE 10 MG: 10 TABLET ORAL at 08:08

## 2021-03-14 RX ADMIN — PANTOPRAZOLE SODIUM 40 MG: 40 TABLET, DELAYED RELEASE ORAL at 16:00

## 2021-03-14 RX ADMIN — ALBUTEROL SULFATE 2 PUFF: 90 AEROSOL, METERED RESPIRATORY (INHALATION) at 21:00

## 2021-03-14 NOTE — NURSING NOTE
Patient rounds were completed every 15 minutes as ordered.  The time period of 0200 to 0245 will not be documented in epic due to daylight savings time occurring during this time frame.

## 2021-03-14 NOTE — PLAN OF CARE
Goal Outcome Evaluation:  Plan of Care Reviewed With: patient  Progress: no change  Outcome Summary: Patient calm and cooperative during shift; reports anxiety and depression both 10; denies SI, HI

## 2021-03-15 ENCOUNTER — ANESTHESIA EVENT (OUTPATIENT)
Dept: PERIOP | Facility: HOSPITAL | Age: 71
End: 2021-03-15

## 2021-03-15 ENCOUNTER — ANESTHESIA (OUTPATIENT)
Dept: PERIOP | Facility: HOSPITAL | Age: 71
End: 2021-03-15

## 2021-03-15 LAB
FLUAV RNA RESP QL NAA+PROBE: NOT DETECTED
FLUBV RNA RESP QL NAA+PROBE: NOT DETECTED
SARS-COV-2 RNA RESP QL NAA+PROBE: NOT DETECTED

## 2021-03-15 PROCEDURE — 99231 SBSQ HOSP IP/OBS SF/LOW 25: CPT | Performed by: PSYCHIATRY & NEUROLOGY

## 2021-03-15 PROCEDURE — 25010000002 SUCCINYLCHOLINE PER 20 MG: Performed by: NURSE ANESTHETIST, CERTIFIED REGISTERED

## 2021-03-15 PROCEDURE — 90870 ELECTROCONVULSIVE THERAPY: CPT | Performed by: PSYCHIATRY & NEUROLOGY

## 2021-03-15 PROCEDURE — GZB3ZZZ ELECTROCONVULSIVE THERAPY, BILATERAL-MULTIPLE SEIZURE: ICD-10-PCS | Performed by: PSYCHIATRY & NEUROLOGY

## 2021-03-15 PROCEDURE — 87636 SARSCOV2 & INF A&B AMP PRB: CPT | Performed by: PSYCHIATRY & NEUROLOGY

## 2021-03-15 RX ORDER — MIDAZOLAM HYDROCHLORIDE 1 MG/ML
0.5 INJECTION INTRAMUSCULAR; INTRAVENOUS
Status: DISCONTINUED | OUTPATIENT
Start: 2021-03-15 | End: 2021-03-15 | Stop reason: HOSPADM

## 2021-03-15 RX ORDER — SUCCINYLCHOLINE CHLORIDE 20 MG/ML
INJECTION INTRAMUSCULAR; INTRAVENOUS AS NEEDED
Status: DISCONTINUED | OUTPATIENT
Start: 2021-03-15 | End: 2021-03-15 | Stop reason: SURG

## 2021-03-15 RX ORDER — SODIUM CHLORIDE, SODIUM LACTATE, POTASSIUM CHLORIDE, CALCIUM CHLORIDE 600; 310; 30; 20 MG/100ML; MG/100ML; MG/100ML; MG/100ML
125 INJECTION, SOLUTION INTRAVENOUS ONCE
Status: DISCONTINUED | OUTPATIENT
Start: 2021-03-15 | End: 2021-03-15 | Stop reason: HOSPADM

## 2021-03-15 RX ORDER — ONDANSETRON 2 MG/ML
4 INJECTION INTRAMUSCULAR; INTRAVENOUS AS NEEDED
Status: DISCONTINUED | OUTPATIENT
Start: 2021-03-15 | End: 2021-03-19 | Stop reason: HOSPADM

## 2021-03-15 RX ORDER — SODIUM CHLORIDE, SODIUM LACTATE, POTASSIUM CHLORIDE, CALCIUM CHLORIDE 600; 310; 30; 20 MG/100ML; MG/100ML; MG/100ML; MG/100ML
INJECTION, SOLUTION INTRAVENOUS CONTINUOUS PRN
Status: DISCONTINUED | OUTPATIENT
Start: 2021-03-15 | End: 2021-03-15 | Stop reason: SURG

## 2021-03-15 RX ORDER — SODIUM CHLORIDE 0.9 % (FLUSH) 0.9 %
10 SYRINGE (ML) INJECTION EVERY 12 HOURS SCHEDULED
Status: DISCONTINUED | OUTPATIENT
Start: 2021-03-15 | End: 2021-03-15 | Stop reason: HOSPADM

## 2021-03-15 RX ORDER — OXYCODONE HYDROCHLORIDE AND ACETAMINOPHEN 5; 325 MG/1; MG/1
1 TABLET ORAL ONCE AS NEEDED
Status: DISCONTINUED | OUTPATIENT
Start: 2021-03-15 | End: 2021-03-19 | Stop reason: HOSPADM

## 2021-03-15 RX ORDER — IPRATROPIUM BROMIDE AND ALBUTEROL SULFATE 2.5; .5 MG/3ML; MG/3ML
3 SOLUTION RESPIRATORY (INHALATION) ONCE AS NEEDED
Status: DISCONTINUED | OUTPATIENT
Start: 2021-03-15 | End: 2021-03-19 | Stop reason: HOSPADM

## 2021-03-15 RX ORDER — SODIUM CHLORIDE 0.9 % (FLUSH) 0.9 %
10 SYRINGE (ML) INJECTION AS NEEDED
Status: DISCONTINUED | OUTPATIENT
Start: 2021-03-15 | End: 2021-03-15 | Stop reason: HOSPADM

## 2021-03-15 RX ORDER — MIDAZOLAM HYDROCHLORIDE 1 MG/ML
1 INJECTION INTRAMUSCULAR; INTRAVENOUS
Status: DISCONTINUED | OUTPATIENT
Start: 2021-03-15 | End: 2021-03-15 | Stop reason: HOSPADM

## 2021-03-15 RX ADMIN — RIVAROXABAN 20 MG: 20 TABLET, FILM COATED ORAL at 09:47

## 2021-03-15 RX ADMIN — HYDROCORTISONE 2.5%: 25 CREAM TOPICAL at 21:18

## 2021-03-15 RX ADMIN — ASPIRIN 81 MG: 81 TABLET, COATED ORAL at 09:47

## 2021-03-15 RX ADMIN — ALBUTEROL SULFATE 2 PUFF: 90 AEROSOL, METERED RESPIRATORY (INHALATION) at 09:47

## 2021-03-15 RX ADMIN — Medication 5 MG: at 21:18

## 2021-03-15 RX ADMIN — ARIPIPRAZOLE 2 MG: 2 TABLET ORAL at 09:49

## 2021-03-15 RX ADMIN — CARVEDILOL 25 MG: 25 TABLET, FILM COATED ORAL at 16:09

## 2021-03-15 RX ADMIN — ALBUTEROL SULFATE 2 PUFF: 90 AEROSOL, METERED RESPIRATORY (INHALATION) at 21:17

## 2021-03-15 RX ADMIN — SUCCINYLCHOLINE CHLORIDE 100 MG: 20 INJECTION, SOLUTION INTRAMUSCULAR; INTRAVENOUS at 08:39

## 2021-03-15 RX ADMIN — SODIUM CHLORIDE, POTASSIUM CHLORIDE, SODIUM LACTATE AND CALCIUM CHLORIDE: 600; 310; 30; 20 INJECTION, SOLUTION INTRAVENOUS at 08:31

## 2021-03-15 RX ADMIN — ALBUTEROL SULFATE 2 PUFF: 90 AEROSOL, METERED RESPIRATORY (INHALATION) at 15:05

## 2021-03-15 RX ADMIN — METHOHEXITAL SODIUM 100 MG: 500 INJECTION, POWDER, LYOPHILIZED, FOR SOLUTION INTRAMUSCULAR; INTRAVENOUS; RECTAL at 08:39

## 2021-03-15 RX ADMIN — AMLODIPINE BESYLATE 10 MG: 10 TABLET ORAL at 09:48

## 2021-03-15 RX ADMIN — BUPROPION HYDROCHLORIDE 300 MG: 150 TABLET, FILM COATED, EXTENDED RELEASE ORAL at 09:49

## 2021-03-15 RX ADMIN — PANTOPRAZOLE SODIUM 40 MG: 40 TABLET, DELAYED RELEASE ORAL at 16:09

## 2021-03-15 RX ADMIN — POLYETHYLENE GLYCOL (3350) 17 G: 17 POWDER, FOR SOLUTION ORAL at 09:47

## 2021-03-15 RX ADMIN — SERTRALINE 150 MG: 50 TABLET, FILM COATED ORAL at 09:49

## 2021-03-15 NOTE — ANESTHESIA POSTPROCEDURE EVALUATION
Patient: Ernesto Easley    Procedure Summary     Date: 03/15/21 Room / Location: [facility] OR  /  [facility] OR    Anesthesia Start: 0830 Anesthesia Stop: 0850    Procedure: ELECTROCONVULSIVE THERAPY (Bilateral ) Diagnosis:       Severe episode of recurrent major depressive disorder, without psychotic features (CMS/HCC)      (Severe episode of recurrent major depressive disorder, without psychotic features (CMS/HCC) [F33.2])    Surgeons: Thomas De Leon MD Provider: Jn Edge MD    Anesthesia Type: general ASA Status: 2          Anesthesia Type: general    Vitals  No vitals data found for the desired time range.          Post Anesthesia Care and Evaluation    Patient location during evaluation: bedside  Patient participation: complete - patient participated  Level of consciousness: awake and alert  Pain score: 1  Pain management: adequate  Airway patency: patent  Anesthetic complications: No anesthetic complications  PONV Status: none  Cardiovascular status: acceptable  Respiratory status: acceptable  Hydration status: acceptable

## 2021-03-15 NOTE — PLAN OF CARE
Goal Outcome Evaluation:  Plan of Care Reviewed With: patient  Progress: improving  Outcome Summary: Patient reports anxiety and depression at 10/10 with minimal eye contact this morning during assessment. Patient went for an ECT this morning with 2 seizures reported; first was 20 seconds, 2nd was 25 seconds. Patient did report feeling better after the ECT. Patient has a 22g in L hand that flushes well with no s/s of infection noted. Patient appetite is fair and sleep is good. No acute distress noted, will continue to monitor.

## 2021-03-15 NOTE — NURSING NOTE
Patient educated on social distancing and to wear a mask when out of room. Needs reinforced daily.

## 2021-03-15 NOTE — PROGRESS NOTES
Perhaps some improvement over the weekend although not reflected in the progress notes. Agrees with ECT planned for later this morning. No medication changes.
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
## 2021-03-15 NOTE — PLAN OF CARE
Goal Outcome Evaluation:  Plan of Care Reviewed With: patient  Progress: improving  Outcome Summary: Therapist met with Patient to discuss treatment progress and disposition plans; Patient agreeable.      Problem: Adult Behavioral Health Plan of Care  Goal: Plan of Care Review  Outcome: Ongoing, Progressing  Flowsheets (Taken 3/15/2021 1400)  Progress: improving  Plan of Care Reviewed With: patient  Patient Agreement with Plan of Care: agrees  Outcome Summary:   Therapist met with Patient to discuss treatment progress and disposition plans   Patient agreeable.  Goal: Optimized Coping Skills in Response to Life Stressors  Outcome: Ongoing, Progressing  Intervention: Promote Effective Coping Strategies  Flowsheets (Taken 3/15/2021 1400)  Supportive Measures:   active listening utilized   decision-making supported   positive reinforcement provided   verbalization of feelings encouraged  Goal: Develops/Participates in Therapeutic [place] to Support Successful Transition  Outcome: Ongoing, Progressing  Intervention: Foster Therapeutic [place]  Flowsheets (Taken 3/15/2021 1400)  Trust Relationship/Rapport:   care explained   questions encouraged   reassurance provided   choices provided   emotional support provided   thoughts/feelings acknowledged   empathic listening provided   questions answered  Intervention: Mutually Develop Transition Plan  Flowsheets (Taken 3/15/2021 1400)  Transition Support:   community resources reviewed   follow-up care coordinated   crisis management plan promoted   follow-up care discussed   crisis management plan verbalized    1401:    DATA: Therapist met with Patient individually this date. Patient agreeable to discuss current treatment progress and discharge concerns.     Therapist staffed case with nursing who states Patient has shown improvement and has been in much better spirits. Patient has been out in the dayroom more watching TV and interacting with peers.     CLINICAL  MANUVERING/INTERVENTIONS:  Assisted Patient in processing session content; acknowledged and normalized Patient’s thoughts, feelings, and concerns by utilizing a person-centered approach in efforts to build appropriate rapport and a positive therapeutic relationship with open and honest communication. Allowed Patient to ventilate regarding current stressors and triggers for negative emotions and thoughts in a safe nonjudgmental environment with unconditional positive regard, active listening skills, and empathy.     ASSESSMENT: Patient was seen today for a follow up. Patient reports he is feeling much better today and was smiling and able to carry on a conversation with this Therapist. Patient reports he had ECT today and it went well. He reports he is just ready to get home. We discussed things he likes to do at home and different ways he can keep himself busy. 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 Patient also reports with the weather getting warmer he plans to start mowing the grass and landscaping. Patient's mood was much improved today. He denies SI or any negative thoughts.     PLAN: Patient will continue stabilization. Patient will continue to receive services offered by Treatment Team.     Patient will follow-up with the [facility].    Assistance with transportation will not be needed. Family member will provide.

## 2021-03-16 PROCEDURE — 99232 SBSQ HOSP IP/OBS MODERATE 35: CPT | Performed by: PSYCHIATRY & NEUROLOGY

## 2021-03-16 RX ORDER — ARIPIPRAZOLE 10 MG/1
5 TABLET ORAL DAILY
Status: DISCONTINUED | OUTPATIENT
Start: 2021-03-17 | End: 2021-03-19 | Stop reason: HOSPADM

## 2021-03-16 RX ADMIN — POLYETHYLENE GLYCOL (3350) 17 G: 17 POWDER, FOR SOLUTION ORAL at 10:11

## 2021-03-16 RX ADMIN — ALBUTEROL SULFATE 2 PUFF: 90 AEROSOL, METERED RESPIRATORY (INHALATION) at 08:42

## 2021-03-16 RX ADMIN — BUPROPION HYDROCHLORIDE 300 MG: 150 TABLET, FILM COATED, EXTENDED RELEASE ORAL at 10:12

## 2021-03-16 RX ADMIN — PANTOPRAZOLE SODIUM 40 MG: 40 TABLET, DELAYED RELEASE ORAL at 16:29

## 2021-03-16 RX ADMIN — RIVAROXABAN 20 MG: 20 TABLET, FILM COATED ORAL at 10:12

## 2021-03-16 RX ADMIN — Medication 5 MG: at 21:04

## 2021-03-16 RX ADMIN — ALBUTEROL SULFATE 2 PUFF: 90 AEROSOL, METERED RESPIRATORY (INHALATION) at 15:11

## 2021-03-16 RX ADMIN — CARVEDILOL 25 MG: 25 TABLET, FILM COATED ORAL at 10:12

## 2021-03-16 RX ADMIN — ALBUTEROL SULFATE 2 PUFF: 90 AEROSOL, METERED RESPIRATORY (INHALATION) at 21:04

## 2021-03-16 RX ADMIN — IBUPROFEN 400 MG: 400 TABLET, FILM COATED ORAL at 21:04

## 2021-03-16 RX ADMIN — SERTRALINE 150 MG: 50 TABLET, FILM COATED ORAL at 10:12

## 2021-03-16 RX ADMIN — AMLODIPINE BESYLATE 10 MG: 10 TABLET ORAL at 10:12

## 2021-03-16 RX ADMIN — ACETAMINOPHEN 650 MG: 325 TABLET ORAL at 14:45

## 2021-03-16 NOTE — PLAN OF CARE
Goal Outcome Evaluation:  Plan of Care Reviewed With: patient  Progress: improving  Outcome Summary: Therapist met with Patient to discuss treatment progress and disposition planning; Patient agreeable.    Problem: Adult Behavioral Health Plan of Care  Goal: Plan of Care Review  Outcome: Ongoing, Progressing  Flowsheets (Taken 3/16/2021 1143)  Progress: improving  Plan of Care Reviewed With: patient  Patient Agreement with Plan of Care: agrees  Outcome Summary:  • Therapist met with Patient to discuss treatment progress and disposition planning  • Patient agreeable.  Goal: Optimized Coping Skills in Response to Life Stressors  Outcome: Ongoing, Progressing  Intervention: Promote Effective Coping Strategies  Flowsheets (Taken 3/16/2021 1143)  Supportive Measures:  • active listening utilized  • decision-making supported  • positive reinforcement provided  • verbalization of feelings encouraged  Goal: Develops/Participates in Therapeutic [place] to Support Successful Transition  Outcome: Ongoing, Progressing  Intervention: Foster Therapeutic [place]  Flowsheets (Taken 3/16/2021 1143)  Trust Relationship/Rapport:  • care explained  • questions encouraged  • choices provided  • reassurance provided  • emotional support provided  • thoughts/feelings acknowledged  • empathic listening provided  • questions answered  Intervention: Mutually Develop Transition Plan  Flowsheets (Taken 3/16/2021 1143)  Transition Support:  • community resources reviewed  • follow-up care coordinated  • crisis management plan promoted  • follow-up care discussed  • crisis management plan verbalized    1144:    DATA: Therapist met with Patient individually this date. Patient agreeable to discuss current treatment progress and discharge concerns.     Therapist spoke with Patient's spouse, Katarzyna on this date and provided an update. Katarzyna reports she thinks a lot of Patient's anxiety right now is coming thinking he has colorectal cancer due to  "his hemorrhoids. She is hoping Patient will be able to get them removed soon and that may ease his mind some. She remains very supportive and hopeful he can return home soon.     CLINICAL MANUVERING/INTERVENTIONS:  Assisted Patient in processing session content; acknowledged and normalized Patient’s thoughts, feelings, and concerns by utilizing a person-centered approach in efforts to build appropriate rapport and a positive therapeutic relationship with open and honest communication. Allowed Patient to ventilate regarding current stressors and triggers for negative emotions and thoughts in a safe nonjudgmental environment with unconditional positive regard, active listening skills, and empathy.     ASSESSMENT: Patient was seen today for a follow up. He was observed to be sitting in the dayroom watching TV. Patient reports he is feeling \"pretty decent\" today and is just ready to get home. He reports he does feel an improvement in his thoughts and mood after ECT and is hopeful he will continue to improve and be able to go home in the next few days. Patient did report some nausea this morning and states he thinks it was just from eating breakfast.    PLAN: Patient will continue stabilization. Patient will continue to receive services offered by Treatment Team.     Patient will follow-up with the [facility].    Assistance with transportation will not be needed. Family member will provide.   "

## 2021-03-16 NOTE — PROGRESS NOTES
"INPATIENT PSYCHIATRIC PROGRESS NOTE    Name:  Ernesto Easley  :  1950  MRN:  [id number]  Visit Number:  [id number]  Length of stay:  18    Behavioral Health Treatment Plan and Problem List: I have reviewed and approved the Behavioral Health Treatment Plan and Problem list.    SUBJECTIVE    CC/ Focus of exam: depression    Patient's subjective status: \"not so good\"     INTERVAL HISTORY: better briefly yesterday afternoon however lapsed back into a depressed state since then exemplified by his refusal to take his medications earlier this morning.  In interview patient's affect depressed with a continuing sense of hopelessness however denying suicidal intent.  Patient agreed with the plan for ECT tomorrow and possibly Thursday imaging for discharge Friday if his status has improved considerably.    Depression rating 1010  Anxiety rating 10/10  Sleep: Intermittent insomnia         Review of Systems   Respiratory: Negative.    Cardiovascular: Negative.    Gastrointestinal: Negative.    Neurological: Negative.          OBJECTIVE    Temp:  [97 °F (36.1 °C)-98.5 °F (36.9 °C)] 97 °F (36.1 °C)  Heart Rate:  [] 82  Resp:  [12-18] 18  BP: ()/(70-94) 118/80    MENTAL STATUS EXAM:        Psychomotor: No psychomotor agitation/retardation, No EPS, No motor tics  Speech-normal rate, amount.  Mood/Affect:  Depressed  Thought Processes:  coherent  Thought Content:  negativistic and mood congruent  Hallucination(s): none  Hopelessness: Yes  Optimistic:minimally  Suicidal Thoughts:   no  Suicidal Plan/Intent:  No  Homicidal Thoughts:  absent  Orientation: oriented x 3  Memory: recent intact    Lab Results (last 24 hours)     ** No results found for the last 24 hours. **           Imaging Results (Last 24 Hours)     ** No results found for the last 24 hours. **           ECG/EMG Results (most recent)     Procedure Component Value Units Date/Time    Adult Transthoracic Echo Complete W/ Cont if Necessary Per Protocol " Nutrition Services [[id number]] Collected: 02/27/21 1308     Updated: 02/27/21 1335     BSA 2.1 m^2      IVSd 0.64 cm      LVIDd 4.4 cm      LVIDs 2.1 cm      LVPWd 0.84 cm      IVS/LVPW 0.77     FS 52.9 %      EDV(Teich) 86.3 ml      ESV(Teich) 13.7 ml      EF(Teich) 84.1 %      EDV(cubed) 83.5 ml      ESV(cubed) 8.7 ml      EF(cubed) 89.5 %      LV mass(C)d 97.9 grams      LV mass(C)dI 47.5 grams/m^2      SV(Teich) 72.6 ml      SI(Teich) 35.2 ml/m^2      SV(cubed) 74.7 ml      SI(cubed) 36.2 ml/m^2      Ao root diam 2.9 cm      Ao root area 6.4 cm^2      ACS 2.0 cm      LA dimension 3.8 cm      LA/Ao 1.3     LVOT diam 2.2 cm      LVOT area 3.8 cm^2      LVOT area(traced) 3.8 cm^2      LVLd ap4 7.1 cm      EDV(MOD-sp4) 66.8 ml      LVLs ap4 6.4 cm      ESV(MOD-sp4) 21.9 ml      EF(MOD-sp4) 67.2 %      SV(MOD-sp4) 44.9 ml      SI(MOD-sp4) 21.8 ml/m^2      Ao root area (BSA corrected) 1.4     LV Thompson Vol (BSA corrected) 32.4 ml/m^2      LV Sys Vol (BSA corrected) 10.6 ml/m^2      MV E max gabe 101.0 cm/sec      MV A max gabe 77.1 cm/sec      MV E/A 1.3     Ao pk gabe 103.0 cm/sec      Ao max PG 4.2 mmHg      Ao V2 mean 83.8 cm/sec      Ao mean PG 3.0 mmHg      Ao V2 VTI 18.0 cm      SV(Ao) 114.8 ml      SI(Ao) 55.7 ml/m^2      PA acc time 0.11 sec      TR max gabe 213.0 cm/sec      RVSP(TR) 28.1 mmHg      RAP systole 10.0 mmHg      PA pr(Accel) 31.3 mmHg      BH CV ECHO KEVON - BZI_BMI 31.0 kilograms/m^2       CV ECHO KEVON - BSA(HAYCOCK) 2.1 m^2       CV ECHO KEVON - BZI_METRIC_WEIGHT 92.5 kg       CV ECHO KEVON - BZI_METRIC_HEIGHT 172.7 cm      Target HR (85%) 129 bpm      Max. Pred. HR (100%) 152 bpm     Narrative:      · Left ventricular ejection fraction appears to be 51 - 55%. Left   ventricular systolic function is normal.  · Left ventricular diastolic function was indeterminate.  · No significant functional valvular abnormalities noted  · There is no evidence of pericardial effusion       ECG 12 Lead [[id number]] Collected:  02/27/21 0153     Updated: 02/27/21 1617     QT Interval 432 ms      QTC Interval 522 ms     Narrative:      Test Reason : Baseline Cardiac Status  Blood Pressure : **/** mmHG  Vent. Rate : 088 BPM     Atrial Rate : 141 BPM     P-R Int : 000 ms          QRS Dur : 102 ms      QT Int : 432 ms       P-R-T Axes : 000 049 -81 degrees     QTc Int : 522 ms    Atrial fibrillation  Nonspecific ST and T wave abnormality  Prolonged QT  Abnormal ECG  When compared with ECG of 26-FEB-2021 18:33, (Unconfirmed)  QT has lengthened  Confirmed by Christopher Aguilar ([id number]) on 2/27/2021 4:17:17 PM    Referred By:             Confirmed By:Christopher Aguilar    ECG 12 Lead [[id number]] Collected: 02/28/21 0721     Updated: 03/03/21 0920     QT Interval 350 ms      QTC Interval 446 ms     Narrative:      Test Reason : qtc monitoring  Blood Pressure : **/** mmHG  Vent. Rate : 098 BPM     Atrial Rate : 441 BPM     P-R Int : 000 ms          QRS Dur : 098 ms      QT Int : 350 ms       P-R-T Axes : 000 046 -82 degrees     QTc Int : 446 ms    Atrial fibrillation  ST & T wave abnormality, consider inferior ischemia  Abnormal ECG  When compared with ECG of 27-FEB-2021 01:53,  QT has shortened  Confirmed by Gabino Napier ([id number]) on 3/3/2021 9:19:51 AM    Referred By:  BAUDILIO           Confirmed By:Gabino Napier    ECG 12 Lead [[id number]] Collected: 03/03/21 1539     Updated: 03/04/21 1822     QT Interval 362 ms      QTC Interval 471 ms     Narrative:      Test Reason : Follow-up monitoring QTC  Blood Pressure : **/** mmHG  Vent. Rate : 102 BPM     Atrial Rate : 093 BPM     P-R Int : 000 ms          QRS Dur : 086 ms      QT Int : 362 ms       P-R-T Axes : 000 050 267 degrees     QTc Int : 471 ms    Atrial fibrillation with rapid ventricular response  ST & T wave abnormality, consider inferolateral ischemia  Abnormal ECG  When compared with ECG of 28-FEB-2021 07:21,  Inverted T waves have replaced nonspecific T wave abnormality in  Lateral  leads  Confirmed by Gabino Napier ([id number]) on 3/4/2021 6:22:03 PM    Referred By:  CYRUS           Confirmed By:Gabino Napier    ECG 12 Lead [[id number]] Collected: 03/06/21 0918     Updated: 03/06/21 1300     QT Interval 364 ms      QTC Interval 440 ms     Narrative:      Test Reason : pre ect workup  Blood Pressure : **/** mmHG  Vent. Rate : 088 BPM     Atrial Rate : 144 BPM     P-R Int : 000 ms          QRS Dur : 088 ms      QT Int : 364 ms       P-R-T Axes : 000 014 -77 degrees     QTc Int : 440 ms    Atrial fibrillation  Nonspecific ST and T wave abnormality  Abnormal ECG  When compared with ECG of 06-MAR-2021 09:17, (Unconfirmed)  No significant change was found  Confirmed by Henry Atkins (2019) on 3/6/2021 1:00:02 PM    Referred By:  CYRUS           Confirmed By:Henry Atkins           ALLERGIES: Patient has no known allergies.      Current Facility-Administered Medications:   •  acetaminophen (TYLENOL) tablet 650 mg, 650 mg, Oral, Q6H PRN, Thomas De Leon MD, 650 mg at 03/12/21 2120  •  albuterol sulfate HFA (PROVENTIL HFA;VENTOLIN HFA;PROAIR HFA) inhaler 2 puff, 2 puff, Inhalation, TID, Thomas De Leon MD, 2 puff at 03/16/21 0842  •  ALPRAZolam (XANAX) tablet 0.5 mg, 0.5 mg, Oral, BID PRN, Thomas De Leon MD, 0.5 mg at 03/14/21 2103  •  aluminum-magnesium hydroxide-simethicone (MAALOX MAX) 400-400-40 MG/5ML suspension 15 mL, 15 mL, Oral, Q6H PRN, Thomas De Leon MD  •  amLODIPine (NORVASC) tablet 10 mg, 10 mg, Oral, Q24H, Thomas De Leon MD, 10 mg at 03/16/21 0842  •  ARIPiprazole (ABILIFY) tablet 2 mg, 2 mg, Oral, Daily, Thomas De Leon MD, 2 mg at 03/16/21 0843  •  aspirin EC tablet 81 mg, 81 mg, Oral, Daily, Thomas De Leon MD, 81 mg at 03/16/21 0841  •  benzonatate (TESSALON) capsule 100 mg, 100 mg, Oral, TID PRN, Thomas D eLeon MD  •  bisacodyl (DULCOLAX) EC tablet 5 mg, 5 mg, Oral, Daily PRN, Thomas De Leon  MD [place]  •  buPROPion XL (WELLBUTRIN XL) 24 hr tablet 300 mg, 300 mg, Oral, Daily, Thomas De Leon MD, 300 mg at 03/16/21 0843  •  carvedilol (COREG) tablet 25 mg, 25 mg, Oral, BID With Meals, Thomas De Leon MD, 25 mg at 03/16/21 0841  •  Hydrocortisone (Perianal) (ANUSOL-HC) 2.5 % rectal cream, , Rectal, BID, Thomas De Leon MD, Given at 03/16/21 0842  •  ibuprofen (ADVIL,MOTRIN) tablet 400 mg, 400 mg, Oral, Q6H PRN, Thomas De Leon MD, 400 mg at 03/03/21 2045  •  ipratropium-albuterol (DUO-NEB) nebulizer solution 3 mL, 3 mL, Nebulization, Once PRN, Bridgette Perdomo CRNA  •  loperamide (IMODIUM) capsule 2 mg, 2 mg, Oral, Q2H PRN, Thomas De Leon MD  •  magnesium hydroxide (MILK OF MAGNESIA) suspension 2400 mg/10mL 10 mL, 10 mL, Oral, Daily PRN, Thomas De Leon MD, 10 mL at 03/13/21 1624  •  melatonin tablet 3 mg, 3 mg, Oral, Nightly PRN, Thomas De Leon MD  •  melatonin tablet 5 mg, 5 mg, Oral, Nightly, Thomas De Leon MD, 5 mg at 03/15/21 2118  •  ondansetron (ZOFRAN) injection 4 mg, 4 mg, Intravenous, PRN, Bridgette Perdomo CRNA  •  ondansetron (ZOFRAN) tablet 4 mg, 4 mg, Oral, Q6H PRN, Thomas De Leon MD  •  oxyCODONE-acetaminophen (PERCOCET) 5-325 MG per tablet 1 tablet, 1 tablet, Oral, Once PRN, Bridgette Perdomo CRNA  •  pantoprazole (PROTONIX) EC tablet 40 mg, 40 mg, Oral, Q PM, Thomas De Leon MD, 40 mg at 03/15/21 1609  •  polyethylene glycol (MIRALAX) packet 17 g, 17 g, Oral, Daily, Thomas De Leon MD, 17 g at 03/16/21 0841  •  rivaroxaban (XARELTO) tablet 20 mg, 20 mg, Oral, Daily, Thomas De Leon MD, 20 mg at 03/16/21 0841  •  sertraline (ZOLOFT) tablet 150 mg, 150 mg, Oral, Daily, Thomas De Leon MD, 150 mg at 03/16/21 0843  •  sodium chloride nasal spray 2 spray, 2 spray, Each ANJALI Aguirre, Thomas De Leon MD    ASSESSMENT & PLAN    Major Depressive Disorder with  psychosis, recurrent     -Depression appears to have worsened since patient suffered from COVID this past January, compounded by A. fib diagnosis this past week  - Sertraline 150 mg daily .  - Wellbutrin XL 300 mg daily  - melatonin 5 mg nightly  -ECT      A. Fib  -Recent diagnosis  -Continue Xarelto and aspirin     Prolonged QTC  -Improved for 48  -Appreciate cardiology's involvement   Selection of antidepressant medications finding least offensive agents in regards to prolonged QTC.  Sertraline and bupropion.     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       Special precautions: Special Precautions Level 3 (q15 min checks)     Behavioral Health Treatment Plan and Problem List: I have reviewed and approved the Behavioral Health Treatment Plan and Problem list.    I spent a total of 30 minutes in direct patient care including  18 minutes face to face with the patient assessment, coordination of care, and counseling the patient on the current and follow-up treatment plans regarding his status and treatment.     JEAN PAUL De Leon MD    Clinician:  Thomas De Leon MD  03/16/21  08:59 EDT    Dictated utilizing Dragon dictation

## 2021-03-16 NOTE — PLAN OF CARE
Problem: Adult Behavioral Health Plan of Care  Goal: Plan of Care Review  Outcome: Ongoing, Progressing  Flowsheets  Taken 3/16/2021 0239  Progress: improving  Plan of Care Reviewed With: patient  Patient Agreement with Plan of Care: agrees  Taken 3/15/2021 2005  Plan of Care Reviewed With: patient  Patient Agreement with Plan of Care: agrees   Goal Outcome Evaluation:  Plan of Care Reviewed With: patient  Progress: improving

## 2021-03-16 NOTE — NURSING NOTE
Patient educated to seek staff as needed for worsening symptoms of depression. Patient educated to social distance when out in dayroom and encouraged to wear a mask when around other patients. Patient verbalized understanding.

## 2021-03-16 NOTE — NURSING NOTE
"PT REFUSED ALL AM MEDS EXCEPT HIS INHALER. PT STATES \"IM TIRED OF TAKING THOSE PILLS\" RN AWARE.  "

## 2021-03-16 NOTE — NURSING NOTE
"Patient reports \"Just don't feel right\" Patient lying in bed at this time with BP of 111/66 HR 85 and temp of 99.2.  PRN Tylenol administered for elevated temperature.  Patient encouraged to sit for a minute before attempting to stand. Patient verbalized understanding. No acute distress noted, will continue to monitor.  "

## 2021-03-16 NOTE — PLAN OF CARE
Goal Outcome Evaluation:  Plan of Care Reviewed With: patient  Progress: no change  Outcome Summary: Patient continues to isolate in his room only coming out for meals or medications. Patient refused morning medications but late took them after speaking with Dr. De Leon. Patient is scheduled for ECT tomorrow morning with signed consents located in chart. Patient appetite is improving but reports disrupted sleep at night. No acute distress noted, will continue to monitor.

## 2021-03-17 ENCOUNTER — ANESTHESIA (OUTPATIENT)
Dept: PERIOP | Facility: HOSPITAL | Age: 71
End: 2021-03-17

## 2021-03-17 ENCOUNTER — ANESTHESIA EVENT (OUTPATIENT)
Dept: PERIOP | Facility: HOSPITAL | Age: 71
End: 2021-03-17

## 2021-03-17 PROCEDURE — 90870 ELECTROCONVULSIVE THERAPY: CPT | Performed by: PSYCHIATRY & NEUROLOGY

## 2021-03-17 PROCEDURE — 25010000002 MIDAZOLAM PER 1 MG: Performed by: NURSE ANESTHETIST, CERTIFIED REGISTERED

## 2021-03-17 PROCEDURE — GZB3ZZZ ELECTROCONVULSIVE THERAPY, BILATERAL-MULTIPLE SEIZURE: ICD-10-PCS | Performed by: PSYCHIATRY & NEUROLOGY

## 2021-03-17 PROCEDURE — 25010000002 SUCCINYLCHOLINE PER 20 MG: Performed by: NURSE ANESTHETIST, CERTIFIED REGISTERED

## 2021-03-17 RX ORDER — SODIUM CHLORIDE, SODIUM LACTATE, POTASSIUM CHLORIDE, CALCIUM CHLORIDE 600; 310; 30; 20 MG/100ML; MG/100ML; MG/100ML; MG/100ML
INJECTION, SOLUTION INTRAVENOUS CONTINUOUS PRN
Status: DISCONTINUED | OUTPATIENT
Start: 2021-03-17 | End: 2021-03-17 | Stop reason: SURG

## 2021-03-17 RX ORDER — SODIUM CHLORIDE 0.9 % (FLUSH) 0.9 %
10 SYRINGE (ML) INJECTION AS NEEDED
Status: DISCONTINUED | OUTPATIENT
Start: 2021-03-17 | End: 2021-03-17 | Stop reason: HOSPADM

## 2021-03-17 RX ORDER — SODIUM CHLORIDE 0.9 % (FLUSH) 0.9 %
3 SYRINGE (ML) INJECTION EVERY 12 HOURS SCHEDULED
Status: DISCONTINUED | OUTPATIENT
Start: 2021-03-17 | End: 2021-03-17 | Stop reason: HOSPADM

## 2021-03-17 RX ORDER — SODIUM CHLORIDE, SODIUM LACTATE, POTASSIUM CHLORIDE, CALCIUM CHLORIDE 600; 310; 30; 20 MG/100ML; MG/100ML; MG/100ML; MG/100ML
125 INJECTION, SOLUTION INTRAVENOUS ONCE
Status: COMPLETED | OUTPATIENT
Start: 2021-03-17 | End: 2021-03-17

## 2021-03-17 RX ORDER — MIDAZOLAM HYDROCHLORIDE 1 MG/ML
INJECTION INTRAMUSCULAR; INTRAVENOUS AS NEEDED
Status: DISCONTINUED | OUTPATIENT
Start: 2021-03-17 | End: 2021-03-17 | Stop reason: SURG

## 2021-03-17 RX ORDER — DROPERIDOL 2.5 MG/ML
0.62 INJECTION, SOLUTION INTRAMUSCULAR; INTRAVENOUS ONCE AS NEEDED
Status: DISCONTINUED | OUTPATIENT
Start: 2021-03-17 | End: 2021-03-19 | Stop reason: HOSPADM

## 2021-03-17 RX ORDER — OXYCODONE HYDROCHLORIDE AND ACETAMINOPHEN 5; 325 MG/1; MG/1
1 TABLET ORAL ONCE AS NEEDED
Status: DISCONTINUED | OUTPATIENT
Start: 2021-03-17 | End: 2021-03-19 | Stop reason: HOSPADM

## 2021-03-17 RX ORDER — SUCCINYLCHOLINE CHLORIDE 20 MG/ML
INJECTION INTRAMUSCULAR; INTRAVENOUS AS NEEDED
Status: DISCONTINUED | OUTPATIENT
Start: 2021-03-17 | End: 2021-03-17 | Stop reason: SURG

## 2021-03-17 RX ORDER — SODIUM CHLORIDE, SODIUM LACTATE, POTASSIUM CHLORIDE, CALCIUM CHLORIDE 600; 310; 30; 20 MG/100ML; MG/100ML; MG/100ML; MG/100ML
100 INJECTION, SOLUTION INTRAVENOUS ONCE AS NEEDED
Status: COMPLETED | OUTPATIENT
Start: 2021-03-17 | End: 2021-03-18

## 2021-03-17 RX ORDER — MIDAZOLAM HYDROCHLORIDE 1 MG/ML
1 INJECTION INTRAMUSCULAR; INTRAVENOUS
Status: DISCONTINUED | OUTPATIENT
Start: 2021-03-17 | End: 2021-03-17 | Stop reason: HOSPADM

## 2021-03-17 RX ORDER — MIDAZOLAM HYDROCHLORIDE 1 MG/ML
0.5 INJECTION INTRAMUSCULAR; INTRAVENOUS
Status: DISCONTINUED | OUTPATIENT
Start: 2021-03-17 | End: 2021-03-17 | Stop reason: HOSPADM

## 2021-03-17 RX ORDER — IPRATROPIUM BROMIDE AND ALBUTEROL SULFATE 2.5; .5 MG/3ML; MG/3ML
3 SOLUTION RESPIRATORY (INHALATION) ONCE AS NEEDED
Status: DISCONTINUED | OUTPATIENT
Start: 2021-03-17 | End: 2021-03-19 | Stop reason: HOSPADM

## 2021-03-17 RX ORDER — SODIUM CHLORIDE 0.9 % (FLUSH) 0.9 %
10 SYRINGE (ML) INJECTION EVERY 12 HOURS SCHEDULED
Status: DISCONTINUED | OUTPATIENT
Start: 2021-03-17 | End: 2021-03-17 | Stop reason: HOSPADM

## 2021-03-17 RX ADMIN — METHOHEXITAL SODIUM 100 MG: 500 INJECTION, POWDER, LYOPHILIZED, FOR SOLUTION INTRAMUSCULAR; INTRAVENOUS; RECTAL at 08:34

## 2021-03-17 RX ADMIN — SUCCINYLCHOLINE CHLORIDE 100 MG: 20 INJECTION, SOLUTION INTRAMUSCULAR; INTRAVENOUS at 08:34

## 2021-03-17 RX ADMIN — SERTRALINE 150 MG: 50 TABLET, FILM COATED ORAL at 11:04

## 2021-03-17 RX ADMIN — HYDROCORTISONE 2.5%: 25 CREAM TOPICAL at 11:02

## 2021-03-17 RX ADMIN — Medication 5 MG: at 21:11

## 2021-03-17 RX ADMIN — SODIUM CHLORIDE, POTASSIUM CHLORIDE, SODIUM LACTATE AND CALCIUM CHLORIDE: 600; 310; 30; 20 INJECTION, SOLUTION INTRAVENOUS at 08:21

## 2021-03-17 RX ADMIN — AMLODIPINE BESYLATE 10 MG: 10 TABLET ORAL at 11:00

## 2021-03-17 RX ADMIN — ARIPIPRAZOLE 5 MG: 10 TABLET ORAL at 11:04

## 2021-03-17 RX ADMIN — ALBUTEROL SULFATE 2 PUFF: 90 AEROSOL, METERED RESPIRATORY (INHALATION) at 21:11

## 2021-03-17 RX ADMIN — CARVEDILOL 25 MG: 25 TABLET, FILM COATED ORAL at 17:07

## 2021-03-17 RX ADMIN — IBUPROFEN 400 MG: 400 TABLET, FILM COATED ORAL at 21:11

## 2021-03-17 RX ADMIN — ALBUTEROL SULFATE 2 PUFF: 90 AEROSOL, METERED RESPIRATORY (INHALATION) at 11:06

## 2021-03-17 RX ADMIN — CARVEDILOL 25 MG: 25 TABLET, FILM COATED ORAL at 07:19

## 2021-03-17 RX ADMIN — MIDAZOLAM 2 MG: 1 INJECTION INTRAMUSCULAR; INTRAVENOUS at 08:40

## 2021-03-17 RX ADMIN — ASPIRIN 81 MG: 81 TABLET, COATED ORAL at 11:00

## 2021-03-17 RX ADMIN — ALBUTEROL SULFATE 2 PUFF: 90 AEROSOL, METERED RESPIRATORY (INHALATION) at 16:53

## 2021-03-17 RX ADMIN — RIVAROXABAN 20 MG: 20 TABLET, FILM COATED ORAL at 10:59

## 2021-03-17 RX ADMIN — SODIUM CHLORIDE, POTASSIUM CHLORIDE, SODIUM LACTATE AND CALCIUM CHLORIDE 125 ML/HR: 600; 310; 30; 20 INJECTION, SOLUTION INTRAVENOUS at 08:09

## 2021-03-17 RX ADMIN — PANTOPRAZOLE SODIUM 40 MG: 40 TABLET, DELAYED RELEASE ORAL at 16:53

## 2021-03-17 RX ADMIN — BUPROPION HYDROCHLORIDE 300 MG: 150 TABLET, FILM COATED, EXTENDED RELEASE ORAL at 11:04

## 2021-03-17 RX ADMIN — METHOHEXITAL SODIUM 100 MG: 500 INJECTION, POWDER, LYOPHILIZED, FOR SOLUTION INTRAMUSCULAR; INTRAVENOUS; RECTAL at 08:38

## 2021-03-17 RX ADMIN — POLYETHYLENE GLYCOL (3350) 17 G: 17 POWDER, FOR SOLUTION ORAL at 11:00

## 2021-03-17 NOTE — NURSING NOTE
REMOVED PTS DENTURES AND JEWELRY AND PLACED THEM IN THE UNIT SAFE, FLUSHED IV, PT REMOVED ALL CLOTHING AND PUT ON A HOSPITAL GOWN, WEIGHED PT AND OBTAINED VITALS, PT IS READY FOR ECT PROCEDURE

## 2021-03-17 NOTE — PLAN OF CARE
Goal Outcome Evaluation:  Plan of Care Reviewed With: patient  Progress: improving  Outcome Summary: Therapist met with Patient to discuss treatment progress and disposition plans; Patient agreeable.    Problem: Adult Behavioral Health Plan of Care  Goal: Plan of Care Review  Outcome: Ongoing, Progressing  Flowsheets (Taken 3/17/2021 1348)  Progress: improving  Plan of Care Reviewed With: patient  Patient Agreement with Plan of Care: agrees  Outcome Summary:   Therapist met with Patient to discuss treatment progress and disposition plans   Patient agreeable.  Goal: Optimized Coping Skills in Response to Life Stressors  Outcome: Ongoing, Progressing  Intervention: Promote Effective Coping Strategies  Flowsheets (Taken 3/17/2021 1348)  Supportive Measures:   active listening utilized   decision-making supported   positive reinforcement provided   verbalization of feelings encouraged  Goal: Develops/Participates in Therapeutic [place] to Support Successful Transition  Outcome: Ongoing, Progressing  Intervention: Foster Therapeutic [place]  Flowsheets (Taken 3/17/2021 1348)  Trust Relationship/Rapport:   care explained   questions encouraged   choices provided   reassurance provided   emotional support provided   thoughts/feelings acknowledged   empathic listening provided   questions answered  Intervention: Mutually Develop Transition Plan  Flowsheets (Taken 3/17/2021 1348)  Transition Support:   community resources reviewed   follow-up care coordinated   crisis management plan promoted   follow-up care discussed   crisis management plan verbalized    1349:    DATA: Therapist met with Patient individually this date. Patient agreeable to discuss current treatment progress and discharge concerns.     CLINICAL MANUVERING/INTERVENTIONS:  Assisted Patient in processing session content; acknowledged and normalized Patient’s thoughts, feelings, and concerns by utilizing a person-centered approach in efforts to build  "appropriate rapport and a positive therapeutic relationship with open and honest communication. Allowed Patient to ventilate regarding current stressors and triggers for negative emotions and thoughts in a safe nonjudgmental environment with unconditional positive regard, active listening skills, and empathy.    ASSESSMENT: Patient was seen today for a follow up. Patient was observed to be sitting in the dayroom watching TV. Patient reports he feeling \"pretty decent\" today. He continues to report he feels like ECT is helping. Patient states he has been eating and using the bathroom regularly. He denies SI or any negative thoughts today. Patient remains hopeful he will be able to return home soon.    PLAN: Patient will continue stabilization. Patient will continue to receive services offered by Treatment Team.     Patient will follow-up with the [facility].    Assistance with transportation will not be needed. Family member will provide.   "

## 2021-03-17 NOTE — PLAN OF CARE
Goal Outcome Evaluation:  Plan of Care Reviewed With: patient  Progress: no change  Outcome Summary: Patient was interested in hygiene wanting to shave and staff washed his clothes. Interacted well with staff rated Anx 10 Dep 10 Denied SI, HI, or AVH scheduled for ECT this AM

## 2021-03-17 NOTE — ANESTHESIA POSTPROCEDURE EVALUATION
Patient: Ernesto Easley    Procedure Summary     Date: 03/17/21 Room / Location: [facility] OR  /  [facility] OR    Anesthesia Start: 0821 Anesthesia Stop: 0845    Procedure: ELECTROCONVULSIVE THERAPY (Bilateral ) Diagnosis:       Severe episode of recurrent major depressive disorder, without psychotic features (CMS/HCC)      (Severe episode of recurrent major depressive disorder, without psychotic features (CMS/HCC) [F33.2])    Surgeons: Thomas De Leon MD Provider: Jn Edge MD    Anesthesia Type: general ASA Status: 2          Anesthesia Type: general    Vitals  Vitals Value Taken Time   /77 03/17/21 0926   Temp 97 °F (36.1 °C) 03/17/21 0846   Pulse 74 03/17/21 0926   Resp 14 03/17/21 0926   SpO2 92 % 03/17/21 0926           Post Anesthesia Care and Evaluation    Patient location during evaluation: bedside  Patient participation: complete - patient participated  Level of consciousness: awake and alert  Pain score: 1  Pain management: adequate  Airway patency: patent  Anesthetic complications: No anesthetic complications  PONV Status: none  Cardiovascular status: acceptable  Respiratory status: acceptable  Hydration status: acceptable

## 2021-03-18 ENCOUNTER — ANESTHESIA (OUTPATIENT)
Dept: PERIOP | Facility: HOSPITAL | Age: 71
End: 2021-03-18

## 2021-03-18 ENCOUNTER — ANESTHESIA EVENT (OUTPATIENT)
Dept: PERIOP | Facility: HOSPITAL | Age: 71
End: 2021-03-18

## 2021-03-18 PROCEDURE — 25010000002 MIDAZOLAM PER 1 MG: Performed by: NURSE ANESTHETIST, CERTIFIED REGISTERED

## 2021-03-18 PROCEDURE — 90870 ELECTROCONVULSIVE THERAPY: CPT | Performed by: PSYCHIATRY & NEUROLOGY

## 2021-03-18 PROCEDURE — 25010000002 ONDANSETRON PER 1 MG: Performed by: NURSE ANESTHETIST, CERTIFIED REGISTERED

## 2021-03-18 PROCEDURE — 94640 AIRWAY INHALATION TREATMENT: CPT

## 2021-03-18 PROCEDURE — GZB2ZZZ ELECTROCONVULSIVE THERAPY, BILATERAL-SINGLE SEIZURE: ICD-10-PCS | Performed by: PSYCHIATRY & NEUROLOGY

## 2021-03-18 PROCEDURE — 25010000002 SUCCINYLCHOLINE PER 20 MG: Performed by: NURSE ANESTHETIST, CERTIFIED REGISTERED

## 2021-03-18 PROCEDURE — 25010000002 KETOROLAC TROMETHAMINE PER 15 MG: Performed by: NURSE ANESTHETIST, CERTIFIED REGISTERED

## 2021-03-18 RX ORDER — IPRATROPIUM BROMIDE AND ALBUTEROL SULFATE 2.5; .5 MG/3ML; MG/3ML
3 SOLUTION RESPIRATORY (INHALATION) ONCE AS NEEDED
Status: DISCONTINUED | OUTPATIENT
Start: 2021-03-18 | End: 2021-03-19 | Stop reason: HOSPADM

## 2021-03-18 RX ORDER — ONDANSETRON 2 MG/ML
INJECTION INTRAMUSCULAR; INTRAVENOUS AS NEEDED
Status: DISCONTINUED | OUTPATIENT
Start: 2021-03-18 | End: 2021-03-18 | Stop reason: SURG

## 2021-03-18 RX ORDER — SODIUM CHLORIDE, SODIUM LACTATE, POTASSIUM CHLORIDE, CALCIUM CHLORIDE 600; 310; 30; 20 MG/100ML; MG/100ML; MG/100ML; MG/100ML
125 INJECTION, SOLUTION INTRAVENOUS ONCE
Status: DISCONTINUED | OUTPATIENT
Start: 2021-03-18 | End: 2021-03-18 | Stop reason: HOSPADM

## 2021-03-18 RX ORDER — DROPERIDOL 2.5 MG/ML
0.62 INJECTION, SOLUTION INTRAMUSCULAR; INTRAVENOUS ONCE AS NEEDED
Status: DISCONTINUED | OUTPATIENT
Start: 2021-03-18 | End: 2021-03-19 | Stop reason: HOSPADM

## 2021-03-18 RX ORDER — SUCCINYLCHOLINE CHLORIDE 20 MG/ML
INJECTION INTRAMUSCULAR; INTRAVENOUS AS NEEDED
Status: DISCONTINUED | OUTPATIENT
Start: 2021-03-18 | End: 2021-03-18 | Stop reason: SURG

## 2021-03-18 RX ORDER — OXYCODONE HYDROCHLORIDE AND ACETAMINOPHEN 5; 325 MG/1; MG/1
1 TABLET ORAL ONCE AS NEEDED
Status: DISCONTINUED | OUTPATIENT
Start: 2021-03-18 | End: 2021-03-19 | Stop reason: HOSPADM

## 2021-03-18 RX ORDER — SODIUM CHLORIDE 0.9 % (FLUSH) 0.9 %
10 SYRINGE (ML) INJECTION EVERY 12 HOURS SCHEDULED
Status: DISCONTINUED | OUTPATIENT
Start: 2021-03-18 | End: 2021-03-18 | Stop reason: HOSPADM

## 2021-03-18 RX ORDER — SODIUM CHLORIDE 0.9 % (FLUSH) 0.9 %
3 SYRINGE (ML) INJECTION EVERY 12 HOURS SCHEDULED
Status: DISCONTINUED | OUTPATIENT
Start: 2021-03-18 | End: 2021-03-18 | Stop reason: HOSPADM

## 2021-03-18 RX ORDER — SODIUM CHLORIDE, SODIUM LACTATE, POTASSIUM CHLORIDE, CALCIUM CHLORIDE 600; 310; 30; 20 MG/100ML; MG/100ML; MG/100ML; MG/100ML
100 INJECTION, SOLUTION INTRAVENOUS ONCE AS NEEDED
Status: DISCONTINUED | OUTPATIENT
Start: 2021-03-18 | End: 2021-03-19 | Stop reason: HOSPADM

## 2021-03-18 RX ORDER — SODIUM CHLORIDE 0.9 % (FLUSH) 0.9 %
10 SYRINGE (ML) INJECTION AS NEEDED
Status: DISCONTINUED | OUTPATIENT
Start: 2021-03-18 | End: 2021-03-18 | Stop reason: HOSPADM

## 2021-03-18 RX ORDER — MIDAZOLAM HYDROCHLORIDE 1 MG/ML
0.5 INJECTION INTRAMUSCULAR; INTRAVENOUS
Status: DISCONTINUED | OUTPATIENT
Start: 2021-03-18 | End: 2021-03-18 | Stop reason: HOSPADM

## 2021-03-18 RX ORDER — MIDAZOLAM HYDROCHLORIDE 1 MG/ML
1 INJECTION INTRAMUSCULAR; INTRAVENOUS
Status: DISCONTINUED | OUTPATIENT
Start: 2021-03-18 | End: 2021-03-18 | Stop reason: HOSPADM

## 2021-03-18 RX ORDER — ONDANSETRON 2 MG/ML
4 INJECTION INTRAMUSCULAR; INTRAVENOUS AS NEEDED
Status: DISCONTINUED | OUTPATIENT
Start: 2021-03-18 | End: 2021-03-19 | Stop reason: HOSPADM

## 2021-03-18 RX ORDER — KETOROLAC TROMETHAMINE 30 MG/ML
INJECTION, SOLUTION INTRAMUSCULAR; INTRAVENOUS AS NEEDED
Status: DISCONTINUED | OUTPATIENT
Start: 2021-03-18 | End: 2021-03-18 | Stop reason: SURG

## 2021-03-18 RX ORDER — FENTANYL CITRATE 50 UG/ML
50 INJECTION, SOLUTION INTRAMUSCULAR; INTRAVENOUS
Status: DISCONTINUED | OUTPATIENT
Start: 2021-03-18 | End: 2021-03-19 | Stop reason: HOSPADM

## 2021-03-18 RX ORDER — MEPERIDINE HYDROCHLORIDE 25 MG/ML
12.5 INJECTION INTRAMUSCULAR; INTRAVENOUS; SUBCUTANEOUS
Status: DISCONTINUED | OUTPATIENT
Start: 2021-03-18 | End: 2021-03-19 | Stop reason: HOSPADM

## 2021-03-18 RX ORDER — MIDAZOLAM HYDROCHLORIDE 1 MG/ML
INJECTION INTRAMUSCULAR; INTRAVENOUS AS NEEDED
Status: DISCONTINUED | OUTPATIENT
Start: 2021-03-18 | End: 2021-03-18 | Stop reason: SURG

## 2021-03-18 RX ADMIN — SODIUM CHLORIDE, POTASSIUM CHLORIDE, SODIUM LACTATE AND CALCIUM CHLORIDE: 600; 310; 30; 20 INJECTION, SOLUTION INTRAVENOUS at 08:39

## 2021-03-18 RX ADMIN — ALBUTEROL SULFATE 2 PUFF: 90 AEROSOL, METERED RESPIRATORY (INHALATION) at 10:34

## 2021-03-18 RX ADMIN — CARVEDILOL 25 MG: 25 TABLET, FILM COATED ORAL at 07:44

## 2021-03-18 RX ADMIN — SERTRALINE 150 MG: 50 TABLET, FILM COATED ORAL at 10:36

## 2021-03-18 RX ADMIN — AMLODIPINE BESYLATE 10 MG: 10 TABLET ORAL at 07:44

## 2021-03-18 RX ADMIN — HYDROCORTISONE 2.5%: 25 CREAM TOPICAL at 10:34

## 2021-03-18 RX ADMIN — ONDANSETRON 4 MG: 2 INJECTION INTRAMUSCULAR; INTRAVENOUS at 08:51

## 2021-03-18 RX ADMIN — ALBUTEROL SULFATE 2 PUFF: 90 AEROSOL, METERED RESPIRATORY (INHALATION) at 21:28

## 2021-03-18 RX ADMIN — BUPROPION HYDROCHLORIDE 300 MG: 150 TABLET, FILM COATED, EXTENDED RELEASE ORAL at 10:37

## 2021-03-18 RX ADMIN — ALBUTEROL SULFATE 2 PUFF: 90 AEROSOL, METERED RESPIRATORY (INHALATION) at 17:07

## 2021-03-18 RX ADMIN — ARIPIPRAZOLE 5 MG: 10 TABLET ORAL at 10:37

## 2021-03-18 RX ADMIN — CARVEDILOL 25 MG: 25 TABLET, FILM COATED ORAL at 17:08

## 2021-03-18 RX ADMIN — PANTOPRAZOLE SODIUM 40 MG: 40 TABLET, DELAYED RELEASE ORAL at 17:08

## 2021-03-18 RX ADMIN — ASPIRIN 81 MG: 81 TABLET, COATED ORAL at 10:34

## 2021-03-18 RX ADMIN — MIDAZOLAM 2 MG: 1 INJECTION INTRAMUSCULAR; INTRAVENOUS at 08:51

## 2021-03-18 RX ADMIN — POLYETHYLENE GLYCOL (3350) 17 G: 17 POWDER, FOR SOLUTION ORAL at 10:32

## 2021-03-18 RX ADMIN — SUCCINYLCHOLINE CHLORIDE 100 MG: 20 INJECTION, SOLUTION INTRAMUSCULAR; INTRAVENOUS at 08:48

## 2021-03-18 RX ADMIN — RIVAROXABAN 20 MG: 20 TABLET, FILM COATED ORAL at 10:33

## 2021-03-18 RX ADMIN — KETOROLAC TROMETHAMINE 30 MG: 30 INJECTION, SOLUTION INTRAMUSCULAR at 08:51

## 2021-03-18 RX ADMIN — Medication 5 MG: at 21:30

## 2021-03-18 RX ADMIN — METHOHEXITAL SODIUM 100 MG: 500 INJECTION, POWDER, LYOPHILIZED, FOR SOLUTION INTRAMUSCULAR; INTRAVENOUS; RECTAL at 08:47

## 2021-03-18 NOTE — ANESTHESIA POSTPROCEDURE EVALUATION
Patient: Ernesto Easley    Procedure Summary     Date: 03/18/21 Room / Location: [facility] OR  /  [facility] OR    Anesthesia Start: 0839 Anesthesia Stop: 0857    Procedure: ELECTROCONVULSIVE THERAPY (N/A ) Diagnosis:       Severe episode of recurrent major depressive disorder, without psychotic features (CMS/HCC)      (Severe episode of recurrent major depressive disorder, without psychotic features (CMS/HCC) [F33.2])    Surgeons: Thomas De Leon MD Provider: Jn Edge MD    Anesthesia Type: general ASA Status: 2          Anesthesia Type: general    Vitals  Vitals Value Taken Time   BP 98/75 03/18/21 0904   Temp 97.6 °F (36.4 °C) 03/18/21 0858   Pulse 84 03/18/21 0904   Resp 12 03/18/21 0904   SpO2 97 % 03/18/21 0904           Post Anesthesia Care and Evaluation    Patient location during evaluation: bedside  Patient participation: complete - patient participated  Level of consciousness: awake and alert  Pain score: 1  Pain management: adequate  Airway patency: patent  Anesthetic complications: No anesthetic complications  PONV Status: none  Cardiovascular status: acceptable  Respiratory status: acceptable  Hydration status: acceptable

## 2021-03-18 NOTE — PLAN OF CARE
Goal Outcome Evaluation:  Plan of Care Reviewed With: patient  Progress: improving  Outcome Summary: Patient went for an ECT this morning and reports feeling somewhat better but continues to isolate in his room. Patient noted with poor eye contact during assessment with head down. [place] has disrupted sleep and fair appetite. No acute distress noted, will continue to monitor.

## 2021-03-18 NOTE — NURSING NOTE
Patient educated on wearing a mask and social distancing when out of dayroom. Patient verbalized understanding.

## 2021-03-18 NOTE — PLAN OF CARE
Goal Outcome Evaluation:  Plan of Care Reviewed With: patient  Progress: improving  Outcome Summary: Therapist met with Patient to discuss treatment progress and disposition plans; Patient agreeable.    Problem: Adult Behavioral Health Plan of Care  Goal: Plan of Care Review  Outcome: Ongoing, Progressing  Flowsheets (Taken 3/18/2021 1053)  Progress: improving  Plan of Care Reviewed With: patient  Patient Agreement with Plan of Care: agrees  Outcome Summary:   Therapist met with Patient to discuss treatment progress and disposition plans   Patient agreeable.  Goal: Optimized Coping Skills in Response to Life Stressors  Outcome: Ongoing, Progressing  Intervention: Promote Effective Coping Strategies  Flowsheets (Taken 3/18/2021 1053)  Supportive Measures:   active listening utilized   decision-making supported   positive reinforcement provided   verbalization of feelings encouraged  Goal: Develops/Participates in Therapeutic Mission to Support Successful Transition  Outcome: Ongoing, Progressing  Intervention: Foster Therapeutic Mission  Flowsheets (Taken 3/18/2021 1053)  Trust Relationship/Rapport:   care explained   questions encouraged   choices provided   reassurance provided   emotional support provided   thoughts/feelings acknowledged   empathic listening provided   questions answered  Intervention: Mutually Develop Transition Plan  Flowsheets (Taken 3/18/2021 1053)  Transition Support:   community resources reviewed   follow-up care coordinated   crisis management plan promoted   follow-up care discussed   crisis management plan verbalized    1054:    DATA: Therapist met with Patient individually this date. Patient agreeable to discuss current treatment progress and discharge concerns.     Patient received another round of ECT today.    CLINICAL MANUVERING/INTERVENTIONS:  Assisted Patient in processing session content; acknowledged and normalized Patient’s thoughts, feelings, and concerns by utilizing a  person-centered approach in efforts to build appropriate rapport and a positive therapeutic relationship with open and honest communication. Allowed Patient to ventilate regarding current stressors and triggers for negative emotions and thoughts in a safe nonjudgmental environment with unconditional positive regard, active listening skills, and empathy.     ASSESSMENT: Patient was seen today for a follow up. He was very lethargic and stated he was tired from ECT. He does report continued improvement in mood and thoughts.     PLAN: Patient will continue stabilization. Patient will continue to receive services offered by Treatment Team.     Patient will follow-up with the [facility].    Assistance with transportation will not be needed. Family member will provide.

## 2021-03-18 NOTE — PLAN OF CARE
Goal Outcome Evaluation:  Plan of Care Reviewed With: patient  Progress: no change  Outcome Summary: Patient rates Anx 8 Dep 8 Denies SI, HI, and AVH reports a good sleep and eating regiment Signed consent for ECT this AM

## 2021-03-19 VITALS
BODY MASS INDEX: 31.34 KG/M2 | HEIGHT: 68 IN | HEART RATE: 95 BPM | WEIGHT: 206.8 LBS | RESPIRATION RATE: 16 BRPM | DIASTOLIC BLOOD PRESSURE: 66 MMHG | TEMPERATURE: 97.7 F | OXYGEN SATURATION: 96 % | SYSTOLIC BLOOD PRESSURE: 99 MMHG

## 2021-03-19 PROCEDURE — 99239 HOSP IP/OBS DSCHRG MGMT >30: CPT | Performed by: PSYCHIATRY & NEUROLOGY

## 2021-03-19 RX ORDER — ALPRAZOLAM 0.5 MG/1
0.5 TABLET ORAL 2 TIMES DAILY PRN
Qty: 60 TABLET | Refills: 0 | Status: SHIPPED | OUTPATIENT
Start: 2021-03-19 | End: 2021-08-17

## 2021-03-19 RX ORDER — LANOLIN ALCOHOL/MO/W.PET/CERES
3 CREAM (GRAM) TOPICAL NIGHTLY PRN
Qty: 30 TABLET | Refills: 0 | Status: SHIPPED | OUTPATIENT
Start: 2021-03-19

## 2021-03-19 RX ORDER — BUPROPION HYDROCHLORIDE 300 MG/1
300 TABLET ORAL DAILY
Qty: 60 TABLET | Refills: 0 | Status: SHIPPED | OUTPATIENT
Start: 2021-03-20 | End: 2021-04-21 | Stop reason: SDUPTHER

## 2021-03-19 RX ORDER — ARIPIPRAZOLE 5 MG/1
5 TABLET ORAL DAILY
Qty: 30 TABLET | Refills: 0 | Status: SHIPPED | OUTPATIENT
Start: 2021-03-20 | End: 2021-04-21

## 2021-03-19 RX ADMIN — ASPIRIN 81 MG: 81 TABLET, COATED ORAL at 08:07

## 2021-03-19 RX ADMIN — SERTRALINE 150 MG: 50 TABLET, FILM COATED ORAL at 08:08

## 2021-03-19 RX ADMIN — ARIPIPRAZOLE 5 MG: 10 TABLET ORAL at 08:08

## 2021-03-19 RX ADMIN — AMLODIPINE BESYLATE 10 MG: 10 TABLET ORAL at 08:07

## 2021-03-19 RX ADMIN — CARVEDILOL 25 MG: 25 TABLET, FILM COATED ORAL at 08:07

## 2021-03-19 RX ADMIN — RIVAROXABAN 20 MG: 20 TABLET, FILM COATED ORAL at 08:07

## 2021-03-19 RX ADMIN — BUPROPION HYDROCHLORIDE 300 MG: 150 TABLET, FILM COATED, EXTENDED RELEASE ORAL at 08:08

## 2021-03-19 RX ADMIN — ALBUTEROL SULFATE 2 PUFF: 90 AEROSOL, METERED RESPIRATORY (INHALATION) at 08:07

## 2021-03-19 RX ADMIN — POLYETHYLENE GLYCOL (3350) 17 G: 17 POWDER, FOR SOLUTION ORAL at 08:07

## 2021-03-19 NOTE — DISCHARGE SUMMARY
Date of Discharge:  3/19/2021    Discharge Diagnosis:Principal Problem:    Severe episode of recurrent major depressive disorder, without psychotic features (CMS/[facility])        Presenting Problem/History of Present Illness:Patient was admitted to the hospital on February 26 having presented depressed verbalizing suicidal ideation, voluntarily admitted for safety evaluation and treatment, see admission note for details.         Hospital Course:    Patient was admitted for safety and stabilization and was placed on standard precautions.  Routine labs were checked.  Patient was assigned a masters level therapist and provided with an opportunity to participate in group and individual therapy on the unit.  Patient seen on a daily basis for evaluation and supportive therapy.  Patient's psychotropic medications to include sertraline 150 mg, aripiprazole 5 mg, and alprazolam 0.5 mg twice daily as needed daily.  Patient received bifrontal electroconvulsive therapy treatments, a total of 6 during his hospital stay.  There is a gradual improvement of his depression although not completely resolved.  At the time of and is very anxious.  Patient was denying any thoughts of harming self or others or any sense of hopelessness at discharge and was very anxious to return home.  Patient's discharge was discussed with his family, wife assuring safety concerns of hospital, no firearms, and was pleased to have the patient return.  Patient is scheduled to return to see this physician on March 22 in the clinic.      Consults:   Consults     Date and Time Order Name Status Description    3/5/2021  9:14 AM Inpatient Hospitalist Consult      3/4/2021 11:51 AM Inpatient Hospitalist Consult      3/3/2021 11:59 AM Inpatient Pulmonology Consult      3/2/2021 12:03 PM Inpatient Hospitalist Consult Completed     2/27/2021  9:05 AM Inpatient Cardiology Consult Completed           Labs:  Lab Results (all)     Procedure Component Value Units Date/Time     COVID PRE-OP / PRE-PROCEDURE SCREENING ORDER (NO ISOLATION) - Swab, Nasopharynx [[id number]]  (Normal) Collected: 03/15/21 0647    Specimen: Swab from Nasopharynx Updated: 03/15/21 0715    Narrative:      The following orders were created for panel order COVID PRE-OP / PRE-PROCEDURE SCREENING ORDER (NO ISOLATION) - Swab, Nasopharynx.  Procedure                               Abnormality         Status                     ---------                               -----------         ------                     COVID-19 and FLU A/B PCR...[[id number]]  Normal              Final result                 Please view results for these tests on the individual orders.    COVID-19 and FLU A/B PCR - Swab, Nasopharynx [[id number]]  (Normal) Collected: 03/15/21 0647    Specimen: Swab from Nasopharynx Updated: 03/15/21 0715     COVID19 Not Detected     Influenza A PCR Not Detected     Influenza B PCR Not Detected    Narrative:      Fact sheet for providers: https://www.fda.gov/media/349992/download    Fact sheet for patients: https://www.fda.gov/media/311151/download    Test performed by PCR.    Basic Metabolic Panel [[id number]]  (Normal) Collected: 03/12/21 0310    Specimen: Blood Updated: 03/12/21 0346     Glucose 82 mg/dL      BUN 15 mg/dL      Creatinine 1.17 mg/dL      Sodium 144 mmol/L      Potassium 3.8 mmol/L      Comment: Slight hemolysis detected by analyzer. Results may be affected.        Chloride 107 mmol/L      CO2 27.0 mmol/L      Calcium 9.4 mg/dL      eGFR Non African Amer 62 mL/min/1.73      BUN/Creatinine Ratio 12.8     Anion Gap 10.0 mmol/L     Narrative:      GFR Normal >60  Chronic Kidney Disease <60  Kidney Failure <15      COVID PRE-OP / PRE-PROCEDURE SCREENING ORDER (NO ISOLATION) - Swab, Nasopharynx [[id number]]  (Normal) Collected: 03/07/21 1734    Specimen: Swab from Nasopharynx Updated: 03/07/21 1838    Narrative:      The following orders were created for panel order COVID PRE-OP / PRE-PROCEDURE SCREENING  ORDER (NO ISOLATION) - Swab, Nasopharynx.  Procedure                               Abnormality         Status                     ---------                               -----------         ------                     COVID-19 and FLU A/B PCR...[[id number]]  Normal              Final result                 Please view results for these tests on the individual orders.    COVID-19 and FLU A/B PCR - Swab, Nasopharynx [[id number]]  (Normal) Collected: 03/07/21 1734    Specimen: Swab from Nasopharynx Updated: 03/07/21 1838     COVID19 Not Detected     Influenza A PCR Not Detected     Influenza B PCR Not Detected    Narrative:      Fact sheet for providers: https://www.fda.gov/media/413572/download    Fact sheet for patients: https://www.fda.gov/media/441956/download    Test performed by PCR.    Basic Metabolic Panel [[id number]]  (Abnormal) Collected: 03/07/21 1010    Specimen: Blood Updated: 03/07/21 1047     Glucose 137 mg/dL      BUN 20 mg/dL      Creatinine 1.04 mg/dL      Sodium 138 mmol/L      Potassium 4.1 mmol/L      Chloride 102 mmol/L      CO2 26.3 mmol/L      Calcium 9.7 mg/dL      eGFR Non African Amer 71 mL/min/1.73      BUN/Creatinine Ratio 19.2     Anion Gap 9.7 mmol/L     Narrative:      GFR Normal >60  Chronic Kidney Disease <60  Kidney Failure <15      Basic Metabolic Panel [[id number]]  (Abnormal) Collected: 03/05/21 1435    Specimen: Blood Updated: 03/05/21 1524     Glucose 140 mg/dL      BUN 29 mg/dL      Creatinine 1.19 mg/dL      Sodium 143 mmol/L      Potassium 4.2 mmol/L      Chloride 103 mmol/L      CO2 25.8 mmol/L      Calcium 9.9 mg/dL      eGFR Non African Amer 61 mL/min/1.73      BUN/Creatinine Ratio 24.4     Anion Gap 14.2 mmol/L     Narrative:      GFR Normal >60  Chronic Kidney Disease <60  Kidney Failure <15      Basic Metabolic Panel [[id number]]  (Abnormal) Collected: 03/04/21 0421    Specimen: Blood Updated: 03/04/21 0502     Glucose 120 mg/dL      BUN 27 mg/dL      Creatinine 1.41 mg/dL       Sodium 138 mmol/L      Potassium 4.1 mmol/L      Comment: Slight hemolysis detected by analyzer. Results may be affected.        Chloride 100 mmol/L      CO2 25.9 mmol/L      Calcium 10.0 mg/dL      eGFR Non African Amer 50 mL/min/1.73      BUN/Creatinine Ratio 19.1     Anion Gap 12.1 mmol/L     Narrative:      GFR Normal >60  Chronic Kidney Disease <60  Kidney Failure <15      POC Glucose Once [[id number]]  (Normal) Collected: 03/03/21 1559    Specimen: Blood Updated: 03/03/21 1609     Glucose 118 mg/dL     Basic Metabolic Panel [[id number]]  (Abnormal) Collected: 03/03/21 0722    Specimen: Blood Updated: 03/03/21 0832     Glucose 112 mg/dL      BUN 19 mg/dL      Creatinine 1.16 mg/dL      Sodium 136 mmol/L      Potassium 3.9 mmol/L      Comment: Slight hemolysis detected by analyzer. Results may be affected.        Chloride 100 mmol/L      CO2 24.5 mmol/L      Calcium 10.3 mg/dL      eGFR Non African Amer 63 mL/min/1.73      BUN/Creatinine Ratio 16.4     Anion Gap 11.5 mmol/L     Narrative:      GFR Normal >60  Chronic Kidney Disease <60  Kidney Failure <15      CBC & Differential [[id number]]  (Abnormal) Collected: 03/03/21 0722    Specimen: Blood Updated: 03/03/21 0812    Narrative:      The following orders were created for panel order CBC & Differential.  Procedure                               Abnormality         Status                     ---------                               -----------         ------                     CBC Auto Differential[[id number]]        Abnormal            Final result                 Please view results for these tests on the individual orders.    CBC Auto Differential [[id number]]  (Abnormal) Collected: 03/03/21 0722    Specimen: Blood Updated: 03/03/21 0812     WBC 8.45 10*3/mm3      RBC 4.96 10*6/mm3      Hemoglobin 15.7 g/dL      Hematocrit 45.8 %      MCV 92.3 fL      MCH 31.7 pg      MCHC 34.3 g/dL      RDW 14.1 %      RDW-SD 47.3 fl      MPV 9.9 fL      Platelets 336  10*3/mm3      Neutrophil % 65.6 %      Lymphocyte % 20.7 %      Monocyte % 11.4 %      Eosinophil % 1.2 %      Basophil % 0.7 %      Immature Grans % 0.4 %      Neutrophils, Absolute 5.55 10*3/mm3      Lymphocytes, Absolute 1.75 10*3/mm3      Monocytes, Absolute 0.96 10*3/mm3      Eosinophils, Absolute 0.10 10*3/mm3      Basophils, Absolute 0.06 10*3/mm3      Immature Grans, Absolute 0.03 10*3/mm3      nRBC 0.0 /100 WBC     POC Glucose Once [[id number]]  (Normal) Collected: 03/03/21 0736    Specimen: Blood Updated: 03/03/21 0758     Glucose 117 mg/dL     Basic Metabolic Panel [[id number]]  (Abnormal) Collected: 03/02/21 1410    Specimen: Blood Updated: 03/02/21 1455     Glucose 124 mg/dL      BUN 18 mg/dL      Creatinine 1.29 mg/dL      Sodium 138 mmol/L      Potassium 4.2 mmol/L      Chloride 101 mmol/L      CO2 26.3 mmol/L      Calcium 10.5 mg/dL      eGFR Non African Amer 55 mL/min/1.73      BUN/Creatinine Ratio 14.0     Anion Gap 10.7 mmol/L     Narrative:      GFR Normal >60  Chronic Kidney Disease <60  Kidney Failure <15      Magnesium [[id number]]  (Normal) Collected: 03/02/21 1410    Specimen: Blood Updated: 03/02/21 1455     Magnesium 2.0 mg/dL     T4, Free [[id number]]  (Normal) Collected: 03/01/21 1345    Specimen: Blood Updated: 03/01/21 1450     Free T4 1.32 ng/dL     Narrative:      Results may be falsely increased if patient taking Biotin.      TSH [[id number]]  (Normal) Collected: 03/01/21 1345    Specimen: Blood Updated: 03/01/21 1450     TSH 3.000 uIU/mL     Lipid Panel [[id number]]  (Abnormal) Collected: 02/27/21 0711    Specimen: Blood Updated: 02/27/21 0803     Total Cholesterol 162 mg/dL      Triglycerides 328 mg/dL      HDL Cholesterol 28 mg/dL      LDL Cholesterol  80 mg/dL      VLDL Cholesterol 54 mg/dL      LDL/HDL Ratio 2.44    Narrative:      Cholesterol Reference Ranges  (U.S. Department of Health and Human Services ATP III Classifications)    Desirable          <200 mg/dL  Borderline High     200-239 mg/dL  High Risk          >240 mg/dL      Triglyceride Reference Ranges  (U.S. Department of Health and Human Services ATP III Classifications)    Normal           <150 mg/dL  Borderline High  150-199 mg/dL  High             200-499 mg/dL  Very High        >500 mg/dL    HDL Reference Ranges  (U.S. Department of Health and Human Services ATP III Classifcations)    Low     <40 mg/dl (major risk factor for CHD)  High    >60 mg/dl ('negative' risk factor for CHD)        LDL Reference Ranges  (U.S. Department of Health and Human Services ATP III Classifcations)    Optimal          <100 mg/dL  Near Optimal     100-129 mg/dL  Borderline High  130-159 mg/dL  High             160-189 mg/dL  Very High        >189 mg/dL          Imaging:  Imaging Results (All)     Procedure Component Value Units Date/Time    XR Spine Lumbar Lateral [[id number]] Collected: 03/05/21 1237     Updated: 03/05/21 1240    Narrative:      EXAMINATION: XR SPINE LUMBAR LATERAL-      CLINICAL INDICATION: ELECTROCONVULSIVE THERAPY        COMPARISON: 06/11/2018     FINDINGS: 2 lateral views of the lumbar spine show preservation of the  vertebral body heights     There is disc space narrowing at L1-2, L2-3, and L3-4     Small spurs are present.       Impression:      Arthritic change, but no acute bony abnormality      This report was finalized on 3/5/2021 12:38 PM by Dr. Randolph Ma MD.       CT Head With & Without Contrast [[id number]] Collected: 03/03/21 0926     Updated: 03/03/21 0959    Narrative:      CT HEAD WITH AND WITHOUT CONTRAST-     CLINICAL INDICATION: ECT clearance        COMPARISON: 06/07/2018      TECHNIQUE: Axial images of the brain were obtained with out and then  after intravenous contrast.  Reformatted images were created in the  sagittal and coronal planes.     DOSE: 1121.70 mGy.cm     Radiation dose reduction techniques were utilized per ALARA protocol.  Automated exposure control was initiated through either or [employer]  or  DoseRight software packages by  protocol.           FINDINGS:   Today's study shows no mass, hemorrhage, or midline shift.   The ventricles, cisterns, and sulci are unremarkable. There is no  hydrocephalus.   Following contrast, there is no abnormal enhancement.  I do not see epidural or subdural hematoma.  The gray-white differentiation is appropriate.   The bone window setting images show no destructive calvarial lesion or  acute calvarial fracture.   The posterior fossa is unremarkable.          Impression:         1. No parenchymal mass, hemorrhage, or midline shift.  2. No contrast-enhancing abnormalities.     This report was finalized on 3/3/2021 9:57 AM by Dr. Randolph Ma MD.       CT Chest Without Contrast Diagnostic [[id number]] Collected: 03/03/21 0926     Updated: 03/03/21 0959    Narrative:      EXAM: CT CHEST WITHOUT CONTRAST DIAGNOSTIC-      CLINICAL INDICATION:Abnormal xray - lung nodule, >= 1 cm      COMPARISON: NONE.     TECHNIQUE: Multiple axial CT images were obtained from lung apex through  upper abdomen without the administration of IV contrast. Reformatted  images in the coronal and/or sagittal plane(s) were generated from the  axial data set to facilitate diagnostic accuracy and/or surgical  planning.     Radiation dose reduction techniques were utilized per ALARA protocol.  Automated exposure control was initiated through either or [employer] or  DoseRight software packages by  protocol.    DOSE (DLP mGy-cm): 745.53 mGy.cm        FINDINGS:     LUNGS: The area on recent plain radiography corresponds to a density in  the right lung on image 39 of the axial series that measures 3.16 cm. It  is adjacent to the fissure and has density suggestive of fluid.  Also nodule anteriorly on image 43 of the axial series measuring 4.4 mm.  No other parenchymal nodules.  There is minimal ground-glass opacification in the lungs which could  represent residua from previous Covid  pneumonia.     HEART: Coronary artery calcifications.     MEDIASTINUM: No masses. No enlarged lymph nodes.  No fluid collections.     PLEURA: No pleural effusion. No pleural mass or abnormal calcification.  No pneumothorax.     VASCULATURE: No evidence of aneurysm.     BONES: No acute bony abnormality.     VISUALIZED UPPER ABDOMEN:The upper abdomen is unremarkable as  visualized.     Other: None.       Impression:         1. 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  2. Pleural-based nodule in the right upper lobe. Recommend  stratification based on patient's risk factors and follow-up.  3. Ground-glass opacities in the lungs could represent residua if the  patient had previous Covid pneumonia.  4. Other findings as above.           This report was finalized on 3/3/2021 9:57 AM by Dr. Randolph Ma MD.       XR Chest PA & Lateral [[id number]] Collected: 03/02/21 1306     Updated: 03/02/21 1414    Narrative:      EXAMINATION: XR CHEST PA AND LATERAL-      CLINICAL INDICATION: Pre-ECT clearance        COMPARISON: 06/07/2018      TECHNIQUE: XR CHEST PA AND LATERAL-      FINDINGS:   LUNGS: Area of increased density in the right lung. Recommend CT of the  chest.      HEART AND MEDIASTINUM: Heart and mediastinal contours are unremarkable        SKELETON: Bony and soft tissue structures are unremarkable.             Impression:      Area of increased density in the right lung. Recommend CT of the chest.     This report was finalized on 3/2/2021 2:12 PM by Dr. Randolph Ma MD.             Condition on Discharge:  improved    Prognosis: Fair    Vital Signs  Temp:  [97.2 °F (36.2 °C)-98.1 °F (36.7 °C)] 97.5 °F (36.4 °C)  Heart Rate:  [63-98] 90  Resp:  [14-18] 18  BP: (100-149)/(68-95) 135/85    Discharge Disposition  Home or Self Care    Discharge Medications     Discharge Medications      New Medications      Instructions Start Date    ARIPiprazole 5 MG tablet  Commonly known as: ABILIFY   5 mg, Oral, Daily   Start Date: March 20, 2021     buPROPion  MG 24 hr tablet  Commonly known as: WELLBUTRIN XL   300 mg, Oral, Daily   Start Date: March 20, 2021     melatonin 3 MG tablet   3 mg, Oral, Nightly PRN      sertraline 50 MG tablet  Commonly known as: ZOLOFT   75 mg, Oral, Daily         Changes to Medications      Instructions Start Date   ALPRAZolam 0.5 MG tablet  Commonly known as: XANAX  What changed: additional instructions   0.5 mg, Oral, 2 Times Daily PRN         Continue These Medications      Instructions Start Date   albuterol sulfate  (90 Base) MCG/ACT inhaler  Commonly known as: PROVENTIL HFA;VENTOLIN HFA;PROAIR HFA   2 puffs, Inhalation, 3 Times Daily      amLODIPine 10 MG tablet  Commonly known as: NORVASC   10 mg, Oral, Every 24 Hours Scheduled      aspirin 81 MG EC tablet   81 mg, Oral, Daily      carvedilol 25 MG tablet  Commonly known as: COREG   25 mg, Oral, 2 Times Daily With Meals      rivaroxaban 20 MG tablet  Commonly known as: XARELTO   20 mg, Oral, Daily         Stop These Medications    FLUoxetine 20 MG capsule  Commonly known as: PROzac     irbesartan-hydrochlorothiazide 150-12.5 MG tablet  Commonly known as: AVALIDE     pantoprazole 40 MG EC tablet  Commonly known as: PROTONIX            Discharge Diet: Regular    Activity at Discharge: Patient not to drive    Follow-up Appointments: Patient appointment to return to the [facility] March 22 2 PM.          Thomas De Leon MD  03/19/21  09:33 EDT  Time spent with the discharge process >30 minutes.     Dictated utilizing Dragon dictation

## 2021-03-19 NOTE — PROGRESS NOTES
This patient will be following up with Dr. De Leon with the [facility].  The After Visit Summary was hand delivered to Dr. De Leon's office on 3/19/21 at 1400 by BERNIE Mcclain LCSW [facility]

## 2021-03-19 NOTE — PROGRESS NOTES
0947:    Patient is being discharged home on this date. Therapist spoke with Patient who states he is continuing to feel better and has been eating better. Patient denies SI or any negative thoughts. Patient reports he is anxious and excited to return home. Patient's spouse is agreeable for Patient to return home and is very supportive.     Patient will follow up with Dr. De Leon in the clinic on Monday, March 22, 2021.

## 2021-03-22 ENCOUNTER — OFFICE VISIT (OUTPATIENT)
Dept: PSYCHIATRY | Facility: CLINIC | Age: 71
End: 2021-03-22

## 2021-03-22 VITALS
WEIGHT: 205 LBS | DIASTOLIC BLOOD PRESSURE: 86 MMHG | BODY MASS INDEX: 31.07 KG/M2 | SYSTOLIC BLOOD PRESSURE: 120 MMHG | HEIGHT: 68 IN | HEART RATE: 103 BPM

## 2021-03-22 DIAGNOSIS — F33.1 MODERATE EPISODE OF RECURRENT MAJOR DEPRESSIVE DISORDER (HCC): Primary | ICD-10-CM

## 2021-03-22 PROCEDURE — 99214 OFFICE O/P EST MOD 30 MIN: CPT | Performed by: PSYCHIATRY & NEUROLOGY

## 2021-03-22 RX ORDER — ERGOCALCIFEROL 1.25 MG/1
CAPSULE ORAL
COMMUNITY

## 2021-03-22 RX ORDER — SERTRALINE HYDROCHLORIDE 100 MG/1
100 TABLET, FILM COATED ORAL DAILY
Qty: 45 TABLET | Refills: 0 | Status: SHIPPED | OUTPATIENT
Start: 2021-03-22 | End: 2021-04-21 | Stop reason: SDUPTHER

## 2021-03-22 NOTE — PROGRESS NOTES
"Patient ID: Ernesto Easley is a 70 y.o. male    SERVICE TYPE: EVALUATION AND MANAGEMENT (greater than 50% of the time spent for supportive psychotherapy).      /86   Pulse 103   Ht 172.7 cm (67.99\")   Wt 93 kg (205 lb)   BMI 31.18 kg/m²     ALLERGIES:  Patient has no known allergies.    CC/ Focus of the visit: depression     HPI: Patient discharged from the hospital March 19, discharge summary reviewed reviewed.  Patient seen with his wife today, both affirmed that he is improved no longer experiencing any sense of hopelessness nor thoughts of self-harm.  He rates his depression  at a 5/10 and the same for anxiety.  He states his interest have improved, he enjoyed watching the [employer] basketball games this weekend and plans to follow his sons high school baseball team.  He states that he is up to taking a 30-minute walk daily, that his appetite is improved, and did sleep well 2 out of the 3 nights since discharge.  Did correct some of the discharge medication instructions, should be taking the Zoloft 150 mg daily and the Coreg 25 mg daily, not twice daily.  Patient continued to contract for safety.  Patient is not to drive for another week.    PFSH: Wife is very supportive and has taken charge of the patient's medication.    Review of Systems   Respiratory: Negative.    Cardiovascular: Negative.    Gastrointestinal: Negative.    Neurological: Negative.    Patient to follow-up with his PCP and cardiologist.      SUPPORTIVE PSYCHOTHERAPY: continuing efforts to promote the therapeutic alliance, address the patient’s issues, and strengthen self awareness, insights, and positive coping skills such as Exercising, listen to music, spending time in nature and utilizing resources.     Mental Status Exam  Appearance: Appropriate  Attitude toward clinician:  cooperative and agreeable   Speech:    Rate:  regular rate and rhythm   Volume: normal  Motor:  no abnormal movements   Mood:  Good  Affect: Patient had a little " reserve in his affect, his just a hint of hesitancy with his responses.  He did experience humor appropriately during the interview.  Thought Processes:  linear, logical, and goal directed  Thought Content:  Normal   Feeling Hopeless: absent  Suicidal Thoughts or Intent:  absent  Homicidal Thoughts:  absent  Perceptual Disturbance: no perceptual disturbance  Attention and Concentration:  good  Insight and Judgement:  good  Memory:  memory appears to be intact    LABS: No results found for this or any previous visit (from the past 168 hour(s)).    MEDICATION ISSUES: Have discussed with the patient the medications Risks, Benefits, and Side effects including potential falls, possible impaired driving and  metabolic adversities among others. No medication side effects or related complaints today.     TREATMENT PLAN/GOALS: Continue supportive psychotherapy efforts and medications as indicated.     Current Outpatient Medications   Medication Sig Dispense Refill   • albuterol sulfate  (90 Base) MCG/ACT inhaler Inhale 2 puffs 3 (Three) Times a Day.     • ALPRAZolam (XANAX) 0.5 MG tablet Take 1 tablet by mouth 2 (Two) Times a Day As Needed for Anxiety. Indications: depression 60 tablet 0   • amLODIPine (NORVASC) 10 MG tablet Take 1 tablet by mouth Daily. 30 tablet 0   • ARIPiprazole (ABILIFY) 5 MG tablet Take 1 tablet by mouth Daily. Indications: Major Depressive Disorder 30 tablet 0   • aspirin (aspirin) 81 MG EC tablet Take 81 mg by mouth Daily. Indications: hypertension     • buPROPion XL (WELLBUTRIN XL) 300 MG 24 hr tablet Take 1 tablet by mouth Daily. Indications: Major Depressive Disorder 60 tablet 0   • carvedilol (COREG) 25 MG tablet Take 25 mg by mouth once daily With Meals.     • melatonin 3 MG tablet Take 1 tablet by mouth At Night As Needed for Sleep. Indications: Depression 30 tablet 0   • sertraline (ZOLOFT) 100 MG tablet Take 1 & 1/2 tablets by mouth Daily. Indications: Major Depressive Disorder 46  tablet 0   • ergocalciferol (ERGOCALCIFEROL) 1.25 MG (28402 UT) capsule ergocalciferol (vitamin D2) 1,250 mcg (50,000 unit) capsule     • rivaroxaban (Xarelto) 20 MG tablet Xarelto 20 mg tablet       No current facility-administered medications for this visit.       COLLATERAL PSYCHOTHERAPEUTIC INTERVENTION:  patient not interested in additional psychotherapy.    RISK: Moderate to significant    Assessment/Plan     Diagnoses and all orders for this visit:    1. Moderate episode of recurrent major depressive disorder (CMS/HCC) (Primary)      -     sertraline (Zoloft) 100 MG tablet; Take 1 tablet by mouth Daily.  Dispense: 45 tablet; Refill: 0  Patient to continue the bupropion and melatonin as well as a as needed alprazolam (states she is taken 1 since home).  Patient has a supply of these medications.      Return in about 4 weeks (around 4/19/2021).         Patient knows to call if symptoms worsen or fail to improve between appointments.     I spent a total of 30 minutes in direct patient care.    Dictated utilizing Lymbix dictation    JEAN PAUL De Leon MD

## 2021-04-01 ENCOUNTER — TELEPHONE (OUTPATIENT)
Dept: PSYCHIATRY | Facility: CLINIC | Age: 71
End: 2021-04-01

## 2021-04-01 NOTE — TELEPHONE ENCOUNTER
Patient's wife called and said that the patient was not doing well having paranoia. He thinks wife is trying to hide papers from him. He acts like wife it trying to take his money. She said that if she gives him the Xanax in the morning and at night it does calm him down but she is worried about him getting addicted to it. Patient states that he is not coming back to see you at his next appt. Wife wants to know if there are other meds that he can be given. She does not know how she will get him here if you need to see him because he refuses.

## 2021-04-05 ENCOUNTER — TELEPHONE (OUTPATIENT)
Dept: PSYCHIATRY | Facility: CLINIC | Age: 71
End: 2021-04-05

## 2021-04-05 RX ORDER — ARIPIPRAZOLE 10 MG/1
10 TABLET ORAL DAILY
Qty: 30 TABLET | Refills: 0 | Status: SHIPPED | OUTPATIENT
Start: 2021-04-05 | End: 2021-04-21 | Stop reason: SDUPTHER

## 2021-04-05 NOTE — PROGRESS NOTES
Call the patient's family back talking to his wife subsequently the patient.  The patient has been verbalizing some paranoid ideation accusing family members of stealing his money and the patient mentioned the fact that they were not allowing him to drive he was reminded that it had been recommendation post ECT for 2 weeks.  Neither the wife nor the patient described depression as an issue, patient sleeping normal, no thoughts of harming self or others.  No audible hallucinations reported.  Patient was taking medication as dispensed by his wife. Has been walking and enjoyed his son's coaching the  BB.  Patient has been reluctant to return to the office till his appointment April 19.  Advised patient and wife to increase the aripiprazole to 10 mg daily calling in a prescription to cover the time between now and his appointment.  There are no firearms in the household.

## 2021-04-06 ENCOUNTER — DOCUMENTATION (OUTPATIENT)
Dept: PSYCHIATRY | Facility: CLINIC | Age: 71
End: 2021-04-06

## 2021-04-06 NOTE — PROGRESS NOTES
Checked with the patient's pharmacy, they went ahead and fill the prescription for the aripiprazole 10 mg to take 1 daily #30 for cash payment of $15.

## 2021-04-21 ENCOUNTER — OFFICE VISIT (OUTPATIENT)
Dept: PSYCHIATRY | Facility: CLINIC | Age: 71
End: 2021-04-21

## 2021-04-21 VITALS
HEIGHT: 68 IN | WEIGHT: 205 LBS | SYSTOLIC BLOOD PRESSURE: 138 MMHG | HEART RATE: 89 BPM | BODY MASS INDEX: 31.07 KG/M2 | DIASTOLIC BLOOD PRESSURE: 90 MMHG

## 2021-04-21 DIAGNOSIS — F33.2 SEVERE EPISODE OF RECURRENT MAJOR DEPRESSIVE DISORDER, WITHOUT PSYCHOTIC FEATURES (HCC): Primary | ICD-10-CM

## 2021-04-21 PROBLEM — F32.9 MDD (MAJOR DEPRESSIVE DISORDER): Status: RESOLVED | Noted: 2021-02-26 | Resolved: 2021-04-21

## 2021-04-21 PROCEDURE — 99214 OFFICE O/P EST MOD 30 MIN: CPT | Performed by: PSYCHIATRY & NEUROLOGY

## 2021-04-21 RX ORDER — HYDROCHLOROTHIAZIDE 25 MG/1
TABLET ORAL
COMMUNITY
Start: 2021-04-13

## 2021-04-21 RX ORDER — SERTRALINE HYDROCHLORIDE 100 MG/1
100 TABLET, FILM COATED ORAL DAILY
Qty: 45 TABLET | Refills: 0 | Status: SHIPPED | OUTPATIENT
Start: 2021-04-21 | End: 2021-05-27 | Stop reason: SDUPTHER

## 2021-04-21 RX ORDER — BUPROPION HYDROCHLORIDE 300 MG/1
300 TABLET ORAL DAILY
Qty: 60 TABLET | Refills: 0 | Status: SHIPPED | OUTPATIENT
Start: 2021-04-21 | End: 2021-05-27 | Stop reason: SDUPTHER

## 2021-04-21 RX ORDER — ARIPIPRAZOLE 10 MG/1
10 TABLET ORAL DAILY
Qty: 30 TABLET | Refills: 0 | Status: SHIPPED | OUTPATIENT
Start: 2021-04-21 | End: 2021-05-27 | Stop reason: SDUPTHER

## 2021-04-21 NOTE — PROGRESS NOTES
"Patient ID: Ernesto Easley is a 70 y.o. male    SERVICE TYPE: EVALUATION AND MANAGEMENT (greater than 50% of the time spent for supportive psychotherapy).      /90   Pulse 89   Ht 172.7 cm (68\")   Wt 93 kg (205 lb)   BMI 31.17 kg/m²     ALLERGIES:  Patient has no known allergies.    CC/ Focus of the visit: depression     HPI: Taking on projects at home, walking daily, enjoying the [employer] bal lgames that his son's couch although avoids others (perhaps relating to the stigma of mental illness - discussed with the patient). Sleeping 6-7 hours/night. Wife - says that his paranoia has cleared after the increase of Abilify to 10 mg.   On instance of expressed dysphoria associated with thoughts family \"didn't care\". Discused.  No hopelessness,  \"laughing more every day now\".   Wife still concerned about patient's driving solo, patient had mentioned in the past he might \"drive off\". For now the wife will be with him when he drives.     PFSH: family all very supportive.     Review of Systems   Respiratory: Negative.    Cardiovascular: Negative.    Gastrointestinal: Negative.    Neurological: Negative.           SUPPORTIVE PSYCHOTHERAPY: continuing efforts to promote the therapeutic alliance, address the patient’s issues, and strengthen self awareness, insights, and positive coping skills such as Exercising, listen to music, spending time in nature and utilizing resources.     Mental Status Exam  Appearance:  Appropriate.   Attitude toward clinician:  cooperative and agreeable   Speech:    Rate:  regular rate and rhythm   Volume: normal  Motor:  no abnormal movements   Mood:  Good  Affect:  euthymic  Thought Processes:  linear, logical, and goal directed  Thought Content:  Normal   Feeling Hopeless: absent  Suicidal Thoughts or Intent:  absent  Homicidal Thoughts:  absent  Perceptual Disturbance: no perceptual disturbance  Attention and Concentration:  good  Insight and Judgement:  good  Memory:  memory appears to be " intact    LABS: No results found for this or any previous visit (from the past 168 hour(s)).    MEDICATION ISSUES: Have discussed with the patient the medications Risks, Benefits, and Side effects including potential falls, possible impaired driving and  metabolic adversities among others. No medication side effects or related complaints today.     TREATMENT PLAN/GOALS: Continue supportive psychotherapy efforts and medications as indicated.     Current Outpatient Medications   Medication Sig Dispense Refill   • albuterol sulfate  (90 Base) MCG/ACT inhaler Inhale 2 puffs 3 (Three) Times a Day.     • ALPRAZolam (XANAX) 0.5 MG tablet Take 1 tablet by mouth 2 (Two) Times a Day As Needed for Anxiety. Indications: depression 60 tablet 0   • amLODIPine (NORVASC) 10 MG tablet Take 1 tablet by mouth Daily. 30 tablet 0   • ARIPiprazole (ABILIFY) 10 MG tablet Take 1 tablet by mouth Daily. 30 tablet 0   • aspirin (aspirin) 81 MG EC tablet Take 81 mg by mouth Daily. Indications: hypertension     • buPROPion XL (WELLBUTRIN XL) 300 MG 24 hr tablet Take 1 tablet by mouth Daily. Indications: Major Depressive Disorder 60 tablet 0   • carvedilol (COREG) 25 MG tablet Take 25 mg by mouth 2 (Two) Times a Day With Meals.     • ergocalciferol (ERGOCALCIFEROL) 1.25 MG (57740 UT) capsule ergocalciferol (vitamin D2) 1,250 mcg (50,000 unit) capsule     • melatonin 3 MG tablet Take 1 tablet by mouth At Night As Needed for Sleep. Indications: Depression 30 tablet 0   • rivaroxaban (Xarelto) 20 MG tablet Xarelto 20 mg tablet     • sertraline (Zoloft) 100 MG tablet Take 1 tablet by mouth Daily. 45 tablet 0   • hydroCHLOROthiazide (HYDRODIURIL) 25 MG tablet        No current facility-administered medications for this visit.       COLLATERAL PSYCHOTHERAPEUTIC INTERVENTION:  patient not interested in additional psychotherapy.    RISK:  Moderate/ significant    Assessment/Plan     Diagnoses and all orders for this visit:    1. Severe episode of  recurrent major depressive disorder, without psychotic features (CMS/HCC) (Primary)  -     ARIPiprazole (ABILIFY) 10 MG tablet; Take 1 tablet by mouth Daily.  Dispense: 30 tablet; Refill: 0  -     buPROPion XL (WELLBUTRIN XL) 300 MG 24 hr tablet; Take 1 tablet by mouth Daily. Indications: Major Depressive Disorder  Dispense: 60 tablet; Refill: 0  -     sertraline (Zoloft) 100 MG tablet; Take 1 tablet by mouth Daily.  Dispense: 45 tablet; Refill: 0        Return in about 4 weeks (around 5/19/2021).           Patient knows to call if symptoms worsen or fail to improve between appointments.    I spent 30 minutes caring for Ernesto on this date of service. This time includes time spent by me in the following activities: Patient evaluation, support psychotherapy, decisions, medications, and documentation.     Dictated utilizing Dragon dictation    JEAN PAUL De Leon MD

## 2021-05-11 ENCOUNTER — OFFICE VISIT (OUTPATIENT)
Dept: URBAN - METROPOLITAN AREA CLINIC 71 | Facility: CLINIC | Age: 71
End: 2021-05-11
Payer: COMMERCIAL

## 2021-05-11 DIAGNOSIS — S05.91XA UNSPECIFIED INJURY OF RIGHT EYE AND ORBIT, INITIAL ENCOUNTER: Primary | ICD-10-CM

## 2021-05-11 DIAGNOSIS — H11.31 SUBCONJUNCTIVAL HEMORRHAGE OF RIGHT EYE: ICD-10-CM

## 2021-05-11 PROCEDURE — 99204 OFFICE O/P NEW MOD 45 MIN: CPT | Performed by: OPTOMETRIST

## 2021-05-11 ASSESSMENT — INTRAOCULAR PRESSURE
OD: 11
OS: 12

## 2021-05-11 NOTE — IMPRESSION/PLAN
Impression: Subconjunctival hemorrhage of right eye: H11.31. Plan: Subconjunctival hemorrhages are caused by broken blood vessels on the surface of the eye. It is a superficial and benign condition. A subconjunctival hemorrhage will usually resolve with time, without the need for any specific treatment. Contacts office if the hemorrhage does not resolve after 2-3 weeks, it appears to be growing, it causes pain or loss of vision.

## 2021-05-11 NOTE — IMPRESSION/PLAN
Impression: Unspecified injury of right eye and orbit, initial encounter: S05.91xA. Plan: Blunt trauma from rock hitting under his right eye. No abrasion, damage to retina or orbital fracture noted today. Trauma caused subconj hem. Recommend using cold compresses and lubricating GTTS for comfort. ok to continue to use the baby aspirin as recommended for his blood pressure.

## 2021-05-27 ENCOUNTER — OFFICE VISIT (OUTPATIENT)
Dept: PSYCHIATRY | Facility: CLINIC | Age: 71
End: 2021-05-27

## 2021-05-27 VITALS
SYSTOLIC BLOOD PRESSURE: 130 MMHG | DIASTOLIC BLOOD PRESSURE: 89 MMHG | HEART RATE: 60 BPM | WEIGHT: 218.8 LBS | BODY MASS INDEX: 33.16 KG/M2 | HEIGHT: 68 IN

## 2021-05-27 DIAGNOSIS — F32.5 MAJOR DEPRESSIVE DISORDER WITH SINGLE EPISODE, IN REMISSION (HCC): Primary | ICD-10-CM

## 2021-05-27 PROCEDURE — 99214 OFFICE O/P EST MOD 30 MIN: CPT | Performed by: PSYCHIATRY & NEUROLOGY

## 2021-05-27 RX ORDER — BISOPROLOL FUMARATE 5 MG/1
TABLET, FILM COATED ORAL
COMMUNITY
Start: 2021-05-03

## 2021-05-27 RX ORDER — SERTRALINE HYDROCHLORIDE 100 MG/1
150 TABLET, FILM COATED ORAL DAILY
Qty: 46 TABLET | Refills: 2 | Status: SHIPPED | OUTPATIENT
Start: 2021-05-27 | End: 2021-08-17 | Stop reason: SDUPTHER

## 2021-05-27 RX ORDER — ATORVASTATIN CALCIUM 80 MG/1
TABLET, FILM COATED ORAL
COMMUNITY
Start: 2021-05-03

## 2021-05-27 RX ORDER — ARIPIPRAZOLE 10 MG/1
10 TABLET ORAL DAILY
Qty: 30 TABLET | Refills: 2 | Status: SHIPPED | OUTPATIENT
Start: 2021-05-27 | End: 2021-08-17

## 2021-05-27 RX ORDER — PANTOPRAZOLE SODIUM 40 MG/1
TABLET, DELAYED RELEASE ORAL
COMMUNITY
Start: 2021-05-03

## 2021-05-27 RX ORDER — BUPROPION HYDROCHLORIDE 300 MG/1
300 TABLET ORAL DAILY
Qty: 60 TABLET | Refills: 2 | Status: SHIPPED | OUTPATIENT
Start: 2021-05-27 | End: 2021-08-17 | Stop reason: SDUPTHER

## 2021-05-27 RX ORDER — FLECAINIDE ACETATE 100 MG/1
TABLET ORAL
COMMUNITY
Start: 2021-05-10

## 2021-05-27 NOTE — PROGRESS NOTES
"Patient ID: Ernesto Easley is a 70 y.o. male    SERVICE TYPE: EVALUATION AND MANAGEMENT (greater than 50% of the time spent for supportive psychotherapy).      /89   Pulse 60   Ht 172.7 cm (67.99\")   Wt 99.2 kg (218 lb 12.8 oz)   BMI 33.28 kg/m²     ALLERGIES:  Patient has no known allergies.    CC/ Focus of the visit: depression     HPI: Patient continues to do well in regards to his affect disorder,, interest activities at the relative norms considering the fact that he has had the cardiac issues as addressed below.  No paranoia.  There has been some concern about his vivid dreams, discussed the possibility that this might be a side effect of the SSRI.  At this point do not want to discontinue the sertraline nor the Wellbutrin given the apparent benefit.  Patient will address the issue of drug interaction with the Rivaroxagan with his cardiologist.    PFSH: Patient's home situation is stable, wife very supportive and informed.  She is seen with his wife today as usual.  He and his wife have been back to [place].    Review of Systems   GI or neurological complaints.Recent cardiovascular events with Atrial Fib, now has a pacemaker. Wife: \"Amazed how well he's managed this\"    SUPPORTIVE PSYCHOTHERAPY: continuing efforts to promote the therapeutic alliance, address the patient’s issues, and strengthen self awareness, insights, and positive coping skills such as Exercising, listen to music, spending time in nature and utilizing resources.     Mental Status Exam  Appearance:  appropriate  Attitude toward clinician:  cooperative and agreeable   Speech:    Rate:  regular rate and rhythm   Volume: normal  Motor:  no abnormal movements   Mood:  Good  Affect:  euthymic  Thought Processes:  linear, logical, and goal directed  Thought Content:  Normal   Feeling Hopeless: absent  Suicidal Thoughts or Intent:  absent  Homicidal Thoughts:  absent  Perceptual Disturbance: no perceptual disturbance  Attention and " Concentration:  good  Insight and Judgement:  good  Memory:  memory appears to be intact    LABS: No results found for this or any previous visit (from the past 168 hour(s)).    MEDICATION ISSUES: Have discussed with the patient the medications Risks, Benefits, and Side effects including potential falls, possible impaired driving and  metabolic adversities among others. No medication side effects or related complaints today.     TREATMENT PLAN/GOALS: Continue supportive psychotherapy efforts and medications as indicated.     Current Outpatient Medications   Medication Sig Dispense Refill   • ALPRAZolam (XANAX) 0.5 MG tablet Take 1 tablet by mouth 2 (Two) Times a Day As Needed for Anxiety. Indications: depression 60 tablet 0   • amLODIPine (NORVASC) 10 MG tablet Take 1 tablet by mouth Daily. 30 tablet 0   • ARIPiprazole (ABILIFY) 10 MG tablet Take 1 tablet by mouth Daily. 30 tablet 2   • bisoprolol (ZEBeta) 5 MG tablet      • buPROPion XL (WELLBUTRIN XL) 300 MG 24 hr tablet Take 1 tablet by mouth Daily. Indications: Major Depressive Disorder 60 tablet 2   • ergocalciferol (ERGOCALCIFEROL) 1.25 MG (17278 UT) capsule ergocalciferol (vitamin D2) 1,250 mcg (50,000 unit) capsule     • melatonin 3 MG tablet Take 1 tablet by mouth At Night As Needed for Sleep. Indications: Depression 30 tablet 0   • pantoprazole (PROTONIX) 40 MG EC tablet      • rivaroxaban (Xarelto) 20 MG tablet Xarelto 20 mg tablet     • sertraline (Zoloft) 100 MG tablet Take 1.5 tablets by mouth Daily. 46 tablet 2   • albuterol sulfate  (90 Base) MCG/ACT inhaler Inhale 2 puffs 3 (Three) Times a Day.     • aspirin (aspirin) 81 MG EC tablet Take 81 mg by mouth Daily. Indications: hypertension     • atorvastatin (LIPITOR) 80 MG tablet      • flecainide (TAMBOCOR) 100 MG tablet      • hydroCHLOROthiazide (HYDRODIURIL) 25 MG tablet        No current facility-administered medications for this visit.       COLLATERAL PSYCHOTHERAPEUTIC INTERVENTION:   patient not interested in additional psychotherapy.    RISK: Moderate to significant    Assessment/Plan     Diagnoses and all orders for this visit:    1. Major depressive disorder with single episode, in remission (CMS/[facility]) (Primary)  -     sertraline (Zoloft) 100 MG tablet; Take 1.5 tablets by mouth Daily.  Dispense: 46 tablet; Refill: 2  -     buPROPion XL (WELLBUTRIN XL) 300 MG 24 hr tablet; Take 1 tablet by mouth Daily. Indications: Major Depressive Disorder  Dispense: 60 tablet; Refill: 2  -     ARIPiprazole (ABILIFY) 10 MG tablet; Take 1 tablet by mouth Daily.  Dispense: 30 tablet; Refill: 2        Return in about 3 months (around 8/27/2021).           Patient knows to call if symptoms worsen or fail to improve between appointments.    I spent 30 minutes caring for Ernesto on this date of service. This time includes time spent by me in the following activities: Patient evaluation, support psychotherapy, decisions, medications, and documentation.     Dictated utilizing [employer] dictation    JEAN PAUL De Leon MD

## 2021-06-22 NOTE — PROGRESS NOTES
"      Inpatient Psych Progress Note     Clinician: Tej De Leon MD  Admission Date: 2/26/2021  08:38 EDT 03/14/21    Behavioral Health Treatment Plan and Problem List: I have reviewed and approved the Behavioral Health Treatment Plan and Problem list.    Allergies  No Known Allergies    Hospital Day: 16 days      Assessment completed within view of staff    History  CC/clinical focus: Major depressive disorder     Interval HPI: Patient seen and evaluated by me.  Chart reviewed.  Patient  rates depression 10/10.  He denies suicidal thoughts this morning. Has had difficulty sleeping since being diagnosed with COVID19 in December.  When he first started on a medication a few years ago for his mood it was helpful, and doesn't seem to be having the same effects as it used to have.  Is Med Compliant.  Denies any physical concerns this morning.   ROS otherwise as below.    Interval Review of Systems:   General ROS: negative for - fever or malaise  Endocrine ROS: negative for - palpitations  Respiratory ROS: no cough, shortness of breath, or wheezing  Cardiovascular ROS: no chest pain or dyspnea on exertion  Gastrointestinal ROS: no abdominal pain,no black or bloody stools    /80 (BP Location: Right arm) Comment: 99/64 stand Comment (Patient Position): sit and stand  Pulse 87 Comment: 80 stand  Temp 98.2 °F (36.8 °C) (Temporal)   Resp 18   Ht 172.7 cm (68\")   Wt 91.7 kg (202 lb 3.2 oz)   SpO2 96%   BMI 30.74 kg/m²     Mental Status Exam  Mood: depressed  Affect: mood-congruent   Thought Processes: linear, logical, and goal directed  Thought Content: normal  Hallucinations: No  Suicidal Thoughts: denies  Suicidal Plan/Intent: denies  Hopelesness:severe  Homicidal Thoughts:  denies      Medical Decision Making:   Labs:     Lab Results (last 24 hours)     ** No results found for the last 24 hours. **            Radiology:     Imaging Results (Last 24 Hours)     ** No results found for the last 24 hours. **       " CHF Week 3 Survey      Responses   [facility] patient discharged from?  [facility]   Does the patient have one of the following disease processes/diagnoses(primary or secondary)?  CHF   Week 3 attempt successful?  No   Unsuccessful attempts  Attempt 1          Berkley Sharma RN        EKG:     ECG/EMG Results (most recent)     Procedure Component Value Units Date/Time    Adult Transthoracic Echo Complete W/ Cont if Necessary Per Protocol [[id number]] Collected: 02/27/21 1308     Updated: 02/27/21 1335     BSA 2.1 m^2      IVSd 0.64 cm      LVIDd 4.4 cm      LVIDs 2.1 cm      LVPWd 0.84 cm      IVS/LVPW 0.77     FS 52.9 %      EDV(Teich) 86.3 ml      ESV(Teich) 13.7 ml      EF(Teich) 84.1 %      EDV(cubed) 83.5 ml      ESV(cubed) 8.7 ml      EF(cubed) 89.5 %      LV mass(C)d 97.9 grams      LV mass(C)dI 47.5 grams/m^2      SV(Teich) 72.6 ml      SI(Teich) 35.2 ml/m^2      SV(cubed) 74.7 ml      SI(cubed) 36.2 ml/m^2      Ao root diam 2.9 cm      Ao root area 6.4 cm^2      ACS 2.0 cm      LA dimension 3.8 cm      LA/Ao 1.3     LVOT diam 2.2 cm      LVOT area 3.8 cm^2      LVOT area(traced) 3.8 cm^2      LVLd ap4 7.1 cm      EDV(MOD-sp4) 66.8 ml      LVLs ap4 6.4 cm      ESV(MOD-sp4) 21.9 ml      EF(MOD-sp4) 67.2 %      SV(MOD-sp4) 44.9 ml      SI(MOD-sp4) 21.8 ml/m^2      Ao root area (BSA corrected) 1.4     LV Thompson Vol (BSA corrected) 32.4 ml/m^2      LV Sys Vol (BSA corrected) 10.6 ml/m^2      MV E max gabe 101.0 cm/sec      MV A max gabe 77.1 cm/sec      MV E/A 1.3     Ao pk gabe 103.0 cm/sec      Ao max PG 4.2 mmHg      Ao V2 mean 83.8 cm/sec      Ao mean PG 3.0 mmHg      Ao V2 VTI 18.0 cm      SV(Ao) 114.8 ml      SI(Ao) 55.7 ml/m^2      PA acc time 0.11 sec      TR max gabe 213.0 cm/sec      RVSP(TR) 28.1 mmHg      RAP systole 10.0 mmHg      PA pr(Accel) 31.3 mmHg       CV ECHO KEVON - BZI_BMI 31.0 kilograms/m^2       CV ECHO KEVON - BSA(HAYCOCK) 2.1 m^2       CV ECHO KEVON - BZI_METRIC_WEIGHT 92.5 kg       CV ECHO KEVON - BZI_METRIC_HEIGHT 172.7 cm      Target HR (85%) 129 bpm      Max. Pred. HR (100%) 152 bpm     Narrative:      · Left ventricular ejection fraction appears to be 51 - 55%. Left   ventricular systolic function is normal.  · Left ventricular diastolic function was  indeterminate.  · No significant functional valvular abnormalities noted  · There is no evidence of pericardial effusion       ECG 12 Lead [[id number]] Collected: 02/27/21 0153     Updated: 02/27/21 1617     QT Interval 432 ms      QTC Interval 522 ms     Narrative:      Test Reason : Baseline Cardiac Status  Blood Pressure : **/** mmHG  Vent. Rate : 088 BPM     Atrial Rate : 141 BPM     P-R Int : 000 ms          QRS Dur : 102 ms      QT Int : 432 ms       P-R-T Axes : 000 049 -81 degrees     QTc Int : 522 ms    Atrial fibrillation  Nonspecific ST and T wave abnormality  Prolonged QT  Abnormal ECG  When compared with ECG of 26-FEB-2021 18:33, (Unconfirmed)  QT has lengthened  Confirmed by Christopher Aguilar ([id number]) on 2/27/2021 4:17:17 PM    Referred By:             Confirmed By:Christopher Aguilar    ECG 12 Lead [[id number]] Collected: 02/28/21 0721     Updated: 03/03/21 0920     QT Interval 350 ms      QTC Interval 446 ms     Narrative:      Test Reason : qtc monitoring  Blood Pressure : **/** mmHG  Vent. Rate : 098 BPM     Atrial Rate : 441 BPM     P-R Int : 000 ms          QRS Dur : 098 ms      QT Int : 350 ms       P-R-T Axes : 000 046 -82 degrees     QTc Int : 446 ms    Atrial fibrillation  ST & T wave abnormality, consider inferior ischemia  Abnormal ECG  When compared with ECG of 27-FEB-2021 01:53,  QT has shortened  Confirmed by Gabino Napier ([id number]) on 3/3/2021 9:19:51 AM    Referred By:  BAUDILIO           Confirmed By:Gabino Napier    ECG 12 Lead [[id number]] Collected: 03/03/21 1539     Updated: 03/04/21 1822     QT Interval 362 ms      QTC Interval 471 ms     Narrative:      Test Reason : Follow-up monitoring QTC  Blood Pressure : **/** mmHG  Vent. Rate : 102 BPM     Atrial Rate : 093 BPM     P-R Int : 000 ms          QRS Dur : 086 ms      QT Int : 362 ms       P-R-T Axes : 000 050 267 degrees     QTc Int : 471 ms    Atrial fibrillation with rapid ventricular response  ST & T wave abnormality, consider  inferolateral ischemia  Abnormal ECG  When compared with ECG of 28-FEB-2021 07:21,  Inverted T waves have replaced nonspecific T wave abnormality in Lateral  leads  Confirmed by Gabino Napier ([id number]) on 3/4/2021 6:22:03 PM    Referred By:  CYRUS           Confirmed By:Gabino Napier    ECG 12 Lead [[id number]] Collected: 03/06/21 0918     Updated: 03/06/21 1300     QT Interval 364 ms      QTC Interval 440 ms     Narrative:      Test Reason : pre ect workup  Blood Pressure : **/** mmHG  Vent. Rate : 088 BPM     Atrial Rate : 144 BPM     P-R Int : 000 ms          QRS Dur : 088 ms      QT Int : 364 ms       P-R-T Axes : 000 014 -77 degrees     QTc Int : 440 ms    Atrial fibrillation  Nonspecific ST and T wave abnormality  Abnormal ECG  When compared with ECG of 06-MAR-2021 09:17, (Unconfirmed)  No significant change was found  Confirmed by Henry Atkins (2019) on 3/6/2021 1:00:02 PM    Referred By:  CYRUS           Confirmed By:Henry Atkins           Medications:  albuterol sulfate HFA, 2 puff, Inhalation, TID  amLODIPine, 10 mg, Oral, Q24H  ARIPiprazole, 2 mg, Oral, Daily  aspirin, 81 mg, Oral, Daily  buPROPion XL, 300 mg, Oral, Daily  carvedilol, 25 mg, Oral, BID With Meals  Hydrocortisone (Perianal), , Rectal, BID  melatonin, 5 mg, Oral, Nightly  pantoprazole, 40 mg, Oral, Q PM  polyethylene glycol, 17 g, Oral, Daily  rivaroxaban, 20 mg, Oral, Daily  sertraline, 150 mg, Oral, Daily           All medications reviewed.      Assessment and Plan:       Major Depressive Disorder with psychosis, recurrent  -Depression appears to have worsened since patient suffered from COVID this past January, compounded by JUAN ANTONIO [place] diagnosis this past week  - Sertraline 150 mg daily .  - Wellbutrin XL 300 mg daily  - melatonin 5 mg nightly  -Continue ECT treatments      JUAN ANTONIO [place]  -Recent diagnosis  -Continue Xarelto and aspirin     Prolonged QTC  -Improved for 46  -Appreciate recent cardiology involvement   Selection of antidepressant  medications finding least offensive agents in regards to prolonged QTC - Sertraline and bupropion.     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     Continue hospitalization for safety and stabilization.  Continue current level of Special Precautions (q15 minute checks).

## 2021-08-17 ENCOUNTER — OFFICE VISIT (OUTPATIENT)
Dept: PSYCHIATRY | Facility: CLINIC | Age: 71
End: 2021-08-17

## 2021-08-17 ENCOUNTER — TELEPHONE (OUTPATIENT)
Dept: PSYCHIATRY | Facility: CLINIC | Age: 71
End: 2021-08-17

## 2021-08-17 VITALS
OXYGEN SATURATION: 96 % | HEIGHT: 68 IN | HEART RATE: 81 BPM | DIASTOLIC BLOOD PRESSURE: 90 MMHG | WEIGHT: 234.2 LBS | BODY MASS INDEX: 35.49 KG/M2 | SYSTOLIC BLOOD PRESSURE: 128 MMHG | TEMPERATURE: 97.8 F

## 2021-08-17 DIAGNOSIS — Z79.899 MEDICATION MANAGEMENT: ICD-10-CM

## 2021-08-17 DIAGNOSIS — F33.40 RECURRENT MAJOR DEPRESSIVE DISORDER, IN REMISSION (HCC): Primary | ICD-10-CM

## 2021-08-17 PROCEDURE — 99214 OFFICE O/P EST MOD 30 MIN: CPT | Performed by: PSYCHIATRY & NEUROLOGY

## 2021-08-17 RX ORDER — ARIPIPRAZOLE 5 MG/1
5 TABLET ORAL DAILY
Qty: 90 TABLET | Refills: 0 | Status: SHIPPED | OUTPATIENT
Start: 2021-08-17 | End: 2021-11-09 | Stop reason: SDUPTHER

## 2021-08-17 RX ORDER — SERTRALINE HYDROCHLORIDE 100 MG/1
150 TABLET, FILM COATED ORAL DAILY
Qty: 140 TABLET | Refills: 0 | Status: SHIPPED | OUTPATIENT
Start: 2021-08-17 | End: 2021-11-09 | Stop reason: SDUPTHER

## 2021-08-17 RX ORDER — BUPROPION HYDROCHLORIDE 300 MG/1
300 TABLET ORAL DAILY
Qty: 90 TABLET | Refills: 0 | Status: SHIPPED | OUTPATIENT
Start: 2021-08-17 | End: 2021-11-09 | Stop reason: SDUPTHER

## 2021-08-17 NOTE — PROGRESS NOTES
"Patient ID: Ernesto Easley is a 71 y.o. male    SERVICE TYPE: EVALUATION AND MANAGEMENT (greater than 50% of the time spent for supportive psychotherapy).      /90   Pulse 81   Temp 97.8 °F (36.6 °C)   Ht 172.7 cm (67.99\")   Wt 106 kg (234 lb 3.2 oz)   SpO2 96%   BMI 35.62 kg/m²     ALLERGIES:  Patient has no known allergies.    CC/ Focus of the visit: depression     HPI: Reports doing well without recurrent significant affective symptoms, activity, sleep, and appetite \"to good\". Weight up 15 lbs. patient seen with his spouse who confirms his admission from depression.  Patient interacting with others, helping his son with the sons interest in [employer].  Last episode of depression March 2021.  We will be reducing the dose of the Abilify from 10 mg to 5 mg daily while continuing the Wellbutrin and Zoloft same doses.  Patient states that he will resume a daily exercise/walk routine, also advised about a low assistant carbohydrate diet.    PFSH: Home life stable, no Internet    Review of Systems  No cardiopulmonary, GI or neurological complaints.  No subsequent difficulties with his atrial fibrillation/pacemaker.      SUPPORTIVE PSYCHOTHERAPY: continuing efforts to promote the therapeutic alliance, address the patient’s issues, and strengthen self awareness, insights, and positive coping skills such as Exercising, listen to music, spending time in nature and utilizing resources.     Mental Status Exam  Appearance: Appropriate  Attitude toward clinician:  cooperative and agreeable   Speech:    Rate:  regular rate and rhythm   Volume: normal  Motor:  no abnormal movements   Mood:  Good  Affect:  euthymic  Thought Processes:  linear, logical, and goal directed  Thought Content:  Normal   Feeling Hopeless: absent  Suicidal Thoughts or Intent:  absent  Homicidal Thoughts:  absent  Perceptual Disturbance: no perceptual disturbance  Attention and Concentration:  good  Insight and Judgement:  " good  Memory:  memory appears to be intact    LABS: No results found for this or any previous visit (from the past 168 hour(s)).    MEDICATION ISSUES: Have discussed with the patient the medications Risks, Benefits, and Side effects including potential falls, possible impaired driving and  metabolic adversities among others. No medication side effects or related complaints today.     TREATMENT PLAN/GOALS: Continue supportive psychotherapy efforts and medications as indicated.     Current Outpatient Medications   Medication Sig Dispense Refill   • amLODIPine (NORVASC) 10 MG tablet Take 1 tablet by mouth Daily. 30 tablet 0   • bisoprolol (ZEBeta) 5 MG tablet      • buPROPion XL (WELLBUTRIN XL) 300 MG 24 hr tablet Take 1 tablet by mouth Daily. Indications: Major Depressive Disorder 90 tablet 0   • ergocalciferol (ERGOCALCIFEROL) 1.25 MG (44522 UT) capsule ergocalciferol (vitamin D2) 1,250 mcg (50,000 unit) capsule     • flecainide (TAMBOCOR) 100 MG tablet      • hydroCHLOROthiazide (HYDRODIURIL) 25 MG tablet      • melatonin 3 MG tablet Take 1 tablet by mouth At Night As Needed for Sleep. Indications: Depression 30 tablet 0   • pantoprazole (PROTONIX) 40 MG EC tablet      • rivaroxaban (Xarelto) 20 MG tablet Xarelto 20 mg tablet     • sertraline (Zoloft) 100 MG tablet Take 1.5 tablets by mouth Daily. 140 tablet 0   • albuterol sulfate  (90 Base) MCG/ACT inhaler Inhale 2 puffs 3 (Three) Times a Day.     • ARIPiprazole (ABILIFY) 5 MG tablet Take 1 tablet by mouth Daily. 90 tablet 0   • aspirin (aspirin) 81 MG EC tablet Take 81 mg by mouth Daily. Indications: hypertension     • atorvastatin (LIPITOR) 80 MG tablet        No current facility-administered medications for this visit.       COLLATERAL PSYCHOTHERAPEUTIC INTERVENTION:  patient not interested in additional psychotherapy.    RISK:  moderate    Assessment/Plan     Diagnoses and all orders for this visit:    1. Recurrent major depressive disorder, in  remission (CMS/HCC) (Primary)  -     sertraline (Zoloft) 100 MG tablet; Take 1.5 tablets by mouth Daily.  Dispense: 140 tablet; Refill: 0  -     buPROPion XL (WELLBUTRIN XL) 300 MG 24 hr tablet; Take 1 tablet by mouth Daily. Indications: Major Depressive Disorder  Dispense: 90 tablet; Refill: 0  -     ARIPiprazole (ABILIFY) 5 MG tablet; Take 1 tablet by mouth Daily.  Dispense: 90 tablet; Refill: 0    2. Medication management  -     [facility] Drug Screen        Return in about 3 months (around 11/17/2021).           Patient knows to call if symptoms worsen or fail to improve between appointments.    I spent 30 minutes caring for Ernesto on this date of service. This time includes time spent by me in the following activities: Patient evaluation, support psychotherapy, decisions, medications, and documentation.     Dictated utilizing Dragon dictation    JEAN PAUL De Leon MD

## 2021-08-17 NOTE — TELEPHONE ENCOUNTER
PHARMACIST IS WANTING TO VERIFY THAT THE PATIENTS ABILIFY IS SUPPOSED TO BE CHANGED FORM 10MG TO 5MG.

## 2021-11-09 ENCOUNTER — OFFICE VISIT (OUTPATIENT)
Dept: PSYCHIATRY | Facility: CLINIC | Age: 71
End: 2021-11-09

## 2021-11-09 VITALS
BODY MASS INDEX: 36.1 KG/M2 | HEART RATE: 62 BPM | SYSTOLIC BLOOD PRESSURE: 126 MMHG | WEIGHT: 238.2 LBS | DIASTOLIC BLOOD PRESSURE: 70 MMHG | HEIGHT: 68 IN

## 2021-11-09 DIAGNOSIS — F33.40 RECURRENT MAJOR DEPRESSIVE DISORDER, IN REMISSION (HCC): Primary | ICD-10-CM

## 2021-11-09 PROCEDURE — 99214 OFFICE O/P EST MOD 30 MIN: CPT | Performed by: PSYCHIATRY & NEUROLOGY

## 2021-11-09 RX ORDER — SERTRALINE HYDROCHLORIDE 100 MG/1
150 TABLET, FILM COATED ORAL DAILY
Qty: 140 TABLET | Refills: 1 | Status: SHIPPED | OUTPATIENT
Start: 2021-11-09 | End: 2022-03-03 | Stop reason: SDUPTHER

## 2021-11-09 RX ORDER — ARIPIPRAZOLE 5 MG/1
TABLET ORAL
Qty: 90 TABLET | Refills: 0 | Status: SHIPPED | OUTPATIENT
Start: 2021-11-09 | End: 2022-03-03 | Stop reason: SDUPTHER

## 2021-11-09 RX ORDER — BUPROPION HYDROCHLORIDE 300 MG/1
300 TABLET ORAL DAILY
Qty: 90 TABLET | Refills: 1 | Status: SHIPPED | OUTPATIENT
Start: 2021-11-09 | End: 2022-03-03 | Stop reason: SDUPTHER

## 2021-11-09 NOTE — PROGRESS NOTES
"Patient ID: Ernesto Easley is a 71 y.o. male    SERVICE TYPE: EVALUATION AND MANAGEMENT (greater than 50% of the time spent for supportive psychotherapy).      /70   Pulse 62   Ht 172.7 cm (67.99\")   Wt 108 kg (238 lb 3.2 oz)   BMI 36.23 kg/m²     ALLERGIES:  Patient has no known allergies.    CC/ Focus of the visit: depression     HPI: \"Been real good\". Weight up 4 lbs, \"trying to walk daily\". Sleep, interest normal, concentration normal, mood stable not been depressed. Follows [employer], attends [employer], \"cleans house\". No intervening health problems. Discussed medications, need to go slow with any reduction. Will reduce the Abilify to 2.5 mg daily.     PFSH: home stable, spouse supportive.     Review of Systems  No cardiopulmonary, GI or neurological complaints.    SUPPORTIVE PSYCHOTHERAPY: continuing efforts to promote the therapeutic alliance, address the patient’s issues, and strengthen self awareness, insights, and positive coping skills such as Exercising, listen to music, spending time in nature and utilizing resources.     Mental Status Exam  Appearance:  Normal.   Attitude toward clinician:  cooperative and agreeable   Speech:    Rate:  regular rate and rhythm   Volume: normal  Motor:  no abnormal movements   Mood:  Good  Affect:  euthymic  Thought Processes:  linear, logical, and goal directed  Thought Content:  Normal   Feeling Hopeless: absent  Suicidal Thoughts or Intent:  absent  Homicidal Thoughts:  absent  Perceptual Disturbance: no perceptual disturbance  Attention and Concentration:  good  Insight and Judgement:  good  Memory:  memory appears to be intact    LABS: No results found for this or any previous visit (from the past 168 hour(s)).    MEDICATION ISSUES: Have discussed with the patient the medications Risks, Benefits, and Side effects including potential falls, possible impaired driving and  metabolic adversities among others. No medication side effects or related " complaints today.     TREATMENT PLAN/GOALS: Continue supportive psychotherapy efforts and medications as indicated.     Current Outpatient Medications   Medication Sig Dispense Refill   • albuterol sulfate  (90 Base) MCG/ACT inhaler Inhale 2 puffs 3 (Three) Times a Day.     • amLODIPine (NORVASC) 10 MG tablet Take 1 tablet by mouth Daily. 30 tablet 0   • ARIPiprazole (ABILIFY) 5 MG tablet Take 1/2 tablet daily 90 tablet 0   • aspirin (aspirin) 81 MG EC tablet Take 81 mg by mouth Daily. Indications: hypertension     • atorvastatin (LIPITOR) 80 MG tablet      • bisoprolol (ZEBeta) 5 MG tablet      • buPROPion XL (WELLBUTRIN XL) 300 MG 24 hr tablet Take 1 tablet by mouth Daily. Indications: Major Depressive Disorder 90 tablet 1   • ergocalciferol (ERGOCALCIFEROL) 1.25 MG (30692 UT) capsule ergocalciferol (vitamin D2) 1,250 mcg (50,000 unit) capsule     • flecainide (TAMBOCOR) 100 MG tablet      • hydroCHLOROthiazide (HYDRODIURIL) 25 MG tablet      • melatonin 3 MG tablet Take 1 tablet by mouth At Night As Needed for Sleep. Indications: Depression 30 tablet 0   • pantoprazole (PROTONIX) 40 MG EC tablet      • rivaroxaban (Xarelto) 20 MG tablet Xarelto 20 mg tablet     • sertraline (Zoloft) 100 MG tablet Take 1.5 tablets by mouth Daily. 140 tablet 1     No current facility-administered medications for this visit.       COLLATERAL PSYCHOTHERAPEUTIC INTERVENTION:  patient not interested in additional psychotherapy.    RISK:  Moderate to significant    Assessment/Plan     Diagnoses and all orders for this visit:    1. Recurrent major depressive disorder, in remission (HCC) (Primary)  -     sertraline (Zoloft) 100 MG tablet; Take 1.5 tablets by mouth Daily.  Dispense: 140 tablet; Refill: 1  -     buPROPion XL (WELLBUTRIN XL) 300 MG 24 hr tablet; Take 1 tablet by mouth Daily. Indications: Major Depressive Disorder  Dispense: 90 tablet; Refill: 1  -     ARIPiprazole (ABILIFY) 5 MG tablet; Take 1/2 tablet daily   Dispense: 90 tablet; Refill: 0        Return in about 4 months (around 3/9/2022).           Patient knows to call if symptoms worsen or fail to improve between appointments.    I spent 30 minutes caring for Ernesto on this date of service. This time includes time spent by me in the following activities: Patient evaluation, support psychotherapy, decisions, medications, and documentation.     Dictated utilizing [employer] dictation    JEAN PAUL De Leon MD

## 2022-03-03 ENCOUNTER — OFFICE VISIT (OUTPATIENT)
Dept: PSYCHIATRY | Facility: CLINIC | Age: 72
End: 2022-03-03

## 2022-03-03 VITALS
HEIGHT: 68 IN | BODY MASS INDEX: 34.71 KG/M2 | WEIGHT: 229 LBS | SYSTOLIC BLOOD PRESSURE: 124 MMHG | HEART RATE: 62 BPM | DIASTOLIC BLOOD PRESSURE: 78 MMHG

## 2022-03-03 DIAGNOSIS — F33.40 RECURRENT MAJOR DEPRESSIVE DISORDER, IN REMISSION: Primary | ICD-10-CM

## 2022-03-03 PROCEDURE — 99214 OFFICE O/P EST MOD 30 MIN: CPT | Performed by: PSYCHIATRY & NEUROLOGY

## 2022-03-03 RX ORDER — BUPROPION HYDROCHLORIDE 300 MG/1
300 TABLET ORAL DAILY
Qty: 90 TABLET | Refills: 1 | Status: SHIPPED | OUTPATIENT
Start: 2022-03-03 | End: 2022-07-12

## 2022-03-03 RX ORDER — SERTRALINE HYDROCHLORIDE 100 MG/1
150 TABLET, FILM COATED ORAL DAILY
Qty: 140 TABLET | Refills: 1 | Status: SHIPPED | OUTPATIENT
Start: 2022-03-03 | End: 2022-07-12

## 2022-03-03 RX ORDER — ARIPIPRAZOLE 5 MG/1
TABLET ORAL
Qty: 46 TABLET | Refills: 0 | Status: SHIPPED | OUTPATIENT
Start: 2022-03-03 | End: 2022-08-16 | Stop reason: SDUPTHER

## 2022-03-03 RX ORDER — ARIPIPRAZOLE 5 MG/1
TABLET ORAL
Qty: 90 TABLET | Refills: 0 | Status: SHIPPED | OUTPATIENT
Start: 2022-03-03 | End: 2022-03-03 | Stop reason: SDUPTHER

## 2022-03-03 NOTE — PROGRESS NOTES
"Patient ID: Ernesto Easley is a 71 y.o. male    SERVICE TYPE: EVALUATION AND MANAGEMENT (greater than 50% of the time spent for supportive psychotherapy).      /78   Pulse 62   Ht 172.7 cm (67.99\")   Wt 104 kg (229 lb)   BMI 34.83 kg/m²     ALLERGIES:  Patient has no known allergies.    CC/ Focus of the visit: depression    HPI: Has not  Had a recurrence of depressive symptomatology, interest,  activities, relationships all at ltheir normal. Is sleeping excessively with day time fatigue, possibly sleep apnea. Declined a referral for evaluation deferring to his PCP. Not likely his medication. Goes shopping and to [employer] with his wife, follows local sports. Discussed pros and cons of continuing the medication as is, will continue as is. Encouraged to resume exercise.    PFSH: home stable, wife supportive.    Review of Systems  No cardiopulmonary, GI or neurological complaints.    SUPPORTIVE PSYCHOTHERAPY: continuing efforts to promote the therapeutic alliance, address the patient’s issues, and strengthen self awareness, insights, and positive coping skills such as Exercising, listen to music, spending time in nature and utilizing resources.     Mental Status Exam  Appearance:  appropriate  Attitude toward clinician:  cooperative and agreeable   Speech:    Rate:  regular rate and rhythm   Volume: normal  Motor:  no abnormal movements   Mood:  Good  Affect:  euthymic  Thought Processes:  linear, logical, and goal directed  Thought Content:  Normal   Feeling Hopeless: absent  Suicidal Thoughts or Intent:  absent  Homicidal Thoughts:  absent  Perceptual Disturbance: no perceptual disturbance  Attention and Concentration:  good  Insight and Judgement:  good  Memory:  memory appears to be intact    LABS: No results found for this or any previous visit (from the past 168 hour(s)).    MEDICATION ISSUES: Have discussed with the patient the medications Risks, Benefits, and Side effects including potential falls, possible " impaired driving and  metabolic adversities among others. No medication side effects or related complaints today.     TREATMENT PLAN/GOALS: Continue supportive psychotherapy efforts and medications as indicated.     Current Outpatient Medications   Medication Sig Dispense Refill   • albuterol sulfate  (90 Base) MCG/ACT inhaler Inhale 2 puffs 3 (Three) Times a Day.     • amLODIPine (NORVASC) 10 MG tablet Take 1 tablet by mouth Daily. 30 tablet 0   • ARIPiprazole (ABILIFY) 5 MG tablet Take 1/2 tablet daily 46 tablet 0   • aspirin (aspirin) 81 MG EC tablet Take 81 mg by mouth Daily. Indications: hypertension     • atorvastatin (LIPITOR) 80 MG tablet      • bisoprolol (ZEBeta) 5 MG tablet      • buPROPion XL (WELLBUTRIN XL) 300 MG 24 hr tablet Take 1 tablet by mouth Daily. Indications: Major Depressive Disorder 90 tablet 1   • ergocalciferol (ERGOCALCIFEROL) 1.25 MG (39809 UT) capsule ergocalciferol (vitamin D2) 1,250 mcg (50,000 unit) capsule     • flecainide (TAMBOCOR) 100 MG tablet      • hydroCHLOROthiazide (HYDRODIURIL) 25 MG tablet      • melatonin 3 MG tablet Take 1 tablet by mouth At Night As Needed for Sleep. Indications: Depression 30 tablet 0   • pantoprazole (PROTONIX) 40 MG EC tablet      • rivaroxaban (Xarelto) 20 MG tablet Xarelto 20 mg tablet     • sertraline (Zoloft) 100 MG tablet Take 1.5 tablets by mouth Daily. 140 tablet 1     No current facility-administered medications for this visit.       COLLATERAL PSYCHOTHERAPEUTIC INTERVENTION:  patient not interested in additional psychotherapy.    RISK:  Moderate to significant.     Assessment/Plan     Diagnoses and all orders for this visit:    1. Recurrent major depressive disorder, in remission (HCC) (Primary)  -     sertraline (Zoloft) 100 MG tablet; Take 1.5 tablets by mouth Daily.  Dispense: 140 tablet; Refill: 1  -     buPROPion XL (WELLBUTRIN XL) 300 MG 24 hr tablet; Take 1 tablet by mouth Daily. Indications: Major Depressive Disorder   Dispense: 90 tablet; Refill: 1  -     Discontinue: ARIPiprazole (ABILIFY) 5 MG tablet; Take 1/2 tablet daily  Dispense: 90 tablet; Refill: 0  -     ARIPiprazole (ABILIFY) 5 MG tablet; Take 1/2 tablet daily  Dispense: 46 tablet; Refill: 0        Return in about 6 months (around 9/3/2022).           Patient knows to call if symptoms worsen or fail to improve between appointments.    I spent 30 minutes caring for Ernesto on this date of service. This time includes time spent by me in the following activities: Patient evaluation, support psychotherapy, decisions, medications, and documentation.     Dictated utilizing [employer] dictation    JEAN PAUL De Leon MD

## 2022-03-16 ENCOUNTER — TELEPHONE (OUTPATIENT)
Dept: PSYCHIATRY | Facility: CLINIC | Age: 72
End: 2022-03-16

## 2022-03-16 NOTE — TELEPHONE ENCOUNTER
Pt's wife called and said that pharmacy only gave pt abilify 15 tabs even though you send in 46 tabs because that is enough for a month supply. Pt is asking if you can go back and put that the pt needs 1 tab daily instead of 1/2 tab so they can get enough meds for 2 months because is expensive.

## 2022-07-11 DIAGNOSIS — F33.40 RECURRENT MAJOR DEPRESSIVE DISORDER, IN REMISSION: ICD-10-CM

## 2022-07-12 RX ORDER — SERTRALINE HYDROCHLORIDE 100 MG/1
TABLET, FILM COATED ORAL
Qty: 135 TABLET | Refills: 1 | Status: SHIPPED | OUTPATIENT
Start: 2022-07-12 | End: 2022-12-06 | Stop reason: SDUPTHER

## 2022-07-12 RX ORDER — BUPROPION HYDROCHLORIDE 300 MG/1
TABLET ORAL
Qty: 90 TABLET | Refills: 1 | Status: SHIPPED | OUTPATIENT
Start: 2022-07-12 | End: 2022-12-06 | Stop reason: SDUPTHER

## 2022-08-16 ENCOUNTER — OFFICE VISIT (OUTPATIENT)
Dept: PSYCHIATRY | Facility: CLINIC | Age: 72
End: 2022-08-16

## 2022-08-16 ENCOUNTER — TELEPHONE (OUTPATIENT)
Dept: PSYCHIATRY | Facility: CLINIC | Age: 72
End: 2022-08-16

## 2022-08-16 VITALS
HEIGHT: 68 IN | HEART RATE: 75 BPM | DIASTOLIC BLOOD PRESSURE: 74 MMHG | SYSTOLIC BLOOD PRESSURE: 124 MMHG | WEIGHT: 228 LBS | BODY MASS INDEX: 34.56 KG/M2

## 2022-08-16 DIAGNOSIS — F33.40 RECURRENT MAJOR DEPRESSIVE DISORDER, IN REMISSION: Primary | ICD-10-CM

## 2022-08-16 PROCEDURE — 99214 OFFICE O/P EST MOD 30 MIN: CPT | Performed by: PSYCHIATRY & NEUROLOGY

## 2022-08-16 RX ORDER — ARIPIPRAZOLE 5 MG/1
TABLET ORAL
Qty: 30 TABLET | Refills: 1 | Status: SHIPPED | OUTPATIENT
Start: 2022-08-16 | End: 2022-08-22

## 2022-08-16 NOTE — TELEPHONE ENCOUNTER
Pharmacy stated directions for Abilify medication states take as directed- they wanted to ask you the exact directions, if that's still 1.5 tablets a day, etc.

## 2022-08-16 NOTE — PROGRESS NOTES
"Patient ID: Ernesto Easley is a 72 y.o. male    SERVICE TYPE: EVALUATION AND MANAGEMENT (greater than 50% of the time spent for supportive psychotherapy).      /74   Pulse 75   Ht 172.7 cm (67.99\")   Wt 103 kg (228 lb)   BMI 34.68 kg/m²     ALLERGIES:  Patient has no known allergies.    CC/ Focus of the visit: depression.     HPI: Seen with his wife as usual.      \"Doing well\". Not been depressed. Interest, active at there norms. Exercising some, .Keeping busy helping son with his couching/ school responsible. Has not gained weight. Mood stable.   Question of possible Sleep Apnea still is pending, has not follow through with recommended test. Sleeping through the night, no excessive snoring, mild degree of daytime somnolence..     PFSH: home quiet and secure, he and his wife are very compatible with each other.     Review of Systems  No cardiopulmonary, GI or neurological complaints.    SUPPORTIVE PSYCHOTHERAPY: continuing efforts to promote the therapeutic alliance, address the patient’s issues, and strengthen self awareness, insights, and positive coping skills such as Exercising, listen to music, spending time in nature and utilizing resources.     Mental Status Exam  Appearance:  appropriate  Attitude toward clinician:  cooperative and agreeable   Speech:    Rate:  regular rate and rhythm   Volume: normal  Motor:  no abnormal movements   Mood:  Good  Affect:  euthymic  Thought Processes:  linear, logical, and goal directed  Thought Content:  Normal   Feeling Hopeless: absent  Suicidal Thoughts or Intent:  absent  Homicidal Thoughts:  absent  Perceptual Disturbance: no perceptual disturbance  Attention and Concentration:  good  Insight and Judgement:  good  Memory:  memory appears to be intact    LABS: No results found for this or any previous visit (from the past 168 hour(s)).    MEDICATION ISSUES: Have discussed with the patient the medications Risks, Benefits, and Side effects including potential falls, " possible impaired driving and  metabolic adversities among others. No medication side effects or related complaints today.     TREATMENT PLAN/GOALS: Continue supportive psychotherapy efforts and medications as indicated.     Current Outpatient Medications   Medication Sig Dispense Refill   • albuterol sulfate  (90 Base) MCG/ACT inhaler Inhale 2 puffs 3 (Three) Times a Day.     • amLODIPine (NORVASC) 10 MG tablet Take 1 tablet by mouth Daily. 30 tablet 0   • ARIPiprazole (ABILIFY) 5 MG tablet Take as directed 30 tablet 1   • aspirin 81 MG EC tablet Take 81 mg by mouth Daily. Indications: hypertension     • atorvastatin (LIPITOR) 80 MG tablet      • bisoprolol (ZEBeta) 5 MG tablet      • buPROPion XL (WELLBUTRIN XL) 300 MG 24 hr tablet TAKE ONE TABLET BY MOUTH ONCE A DAY 90 tablet 1   • ergocalciferol (ERGOCALCIFEROL) 1.25 MG (28900 UT) capsule ergocalciferol (vitamin D2) 1,250 mcg (50,000 unit) capsule     • flecainide (TAMBOCOR) 100 MG tablet      • hydroCHLOROthiazide (HYDRODIURIL) 25 MG tablet      • melatonin 3 MG tablet Take 1 tablet by mouth At Night As Needed for Sleep. Indications: Depression 30 tablet 0   • pantoprazole (PROTONIX) 40 MG EC tablet      • rivaroxaban (XARELTO) 20 MG tablet Xarelto 20 mg tablet     • sertraline (ZOLOFT) 100 MG tablet TAKE 1 1/2 (ONE AND A HALF)PO ONCE DAILY 135 tablet 1     No current facility-administered medications for this visit.       COLLATERAL PSYCHOTHERAPEUTIC INTERVENTION:  patient not interested in additional psychotherapy.    RISK:  Moderate to significant given the severity of his prior episodes of depression, thus continue the medications.     Assessment & Plan     Diagnoses and all orders for this visit:    1. Recurrent major depressive disorder, in remission (HCC) (Primary)  -     ARIPiprazole (ABILIFY) 5 MG tablet; Take as directed, 1/2 daily  Dispense: 30 tablet; Refill: 1    Continuing the Zoloft 150 mg daily and the Wellbutrin  mg daily - has  supply of both.     Return in about 4 months (around 12/16/2022).           Patient knows to call if symptoms worsen or fail to improve between appointments.    I spent 30 minutes caring for Ernesto on this date of service. This time includes time spent by me in the following activities: Patient evaluation, support psychotherapy, decisions, medications, and documentation.     Dictated utilizing [employer] dictation    JEAN PAUL De Leon MD

## 2022-08-16 NOTE — TELEPHONE ENCOUNTER
Looks like this is Moises's patient but his note states 1.5 tablets of Zoloft daily and one half, 0.5, tablets of Abilify daily.

## 2022-08-22 ENCOUNTER — TELEPHONE (OUTPATIENT)
Dept: PSYCHIATRY | Facility: CLINIC | Age: 72
End: 2022-08-22

## 2022-08-22 RX ORDER — ARIPIPRAZOLE 2 MG/1
2 TABLET ORAL DAILY
Qty: 90 TABLET | Refills: 0 | Status: SHIPPED | OUTPATIENT
Start: 2022-08-22 | End: 2022-11-17

## 2022-08-22 NOTE — TELEPHONE ENCOUNTER
"Ernesto's wife called and stated that they cannot get his meds filled because the med description is \"Take as directed.\"  "

## 2022-08-24 NOTE — TELEPHONE ENCOUNTER
Spoke with pt's wife and made her aware that Dr. De Leon did want the patient to take the abilify 2mg.

## 2022-08-24 NOTE — TELEPHONE ENCOUNTER
Pt's wife called back and wants to make sure that it's ok for daya's abilify to be decreased  from 2.5 mg to 2 mg. She said that he had been given 5mg tablet and they were taking 1/2 of it, she wants to verify before she picks them up at the pharmacy.

## 2022-11-17 RX ORDER — ARIPIPRAZOLE 2 MG/1
TABLET ORAL
Qty: 90 TABLET | Refills: 0 | Status: SHIPPED | OUTPATIENT
Start: 2022-11-17

## 2022-12-06 ENCOUNTER — OFFICE VISIT (OUTPATIENT)
Dept: PSYCHIATRY | Facility: CLINIC | Age: 72
End: 2022-12-06

## 2022-12-06 VITALS
SYSTOLIC BLOOD PRESSURE: 135 MMHG | HEART RATE: 69 BPM | BODY MASS INDEX: 34.89 KG/M2 | HEIGHT: 68 IN | WEIGHT: 230.2 LBS | DIASTOLIC BLOOD PRESSURE: 89 MMHG

## 2022-12-06 DIAGNOSIS — F33.40 RECURRENT MAJOR DEPRESSIVE DISORDER, IN REMISSION: Primary | ICD-10-CM

## 2022-12-06 PROCEDURE — 99214 OFFICE O/P EST MOD 30 MIN: CPT | Performed by: PSYCHIATRY & NEUROLOGY

## 2022-12-06 RX ORDER — SERTRALINE HYDROCHLORIDE 100 MG/1
TABLET, FILM COATED ORAL
Qty: 135 TABLET | Refills: 0 | Status: SHIPPED | OUTPATIENT
Start: 2022-12-06

## 2022-12-06 RX ORDER — BUPROPION HYDROCHLORIDE 300 MG/1
300 TABLET ORAL DAILY
Qty: 90 TABLET | Refills: 0 | Status: SHIPPED | OUTPATIENT
Start: 2022-12-06

## 2022-12-06 RX ORDER — ROSUVASTATIN CALCIUM 20 MG/1
TABLET, COATED ORAL
COMMUNITY

## 2022-12-06 NOTE — PROGRESS NOTES
"Patient ID: Ernesto Easley is a 72 y.o. male    SERVICE TYPE: EVALUATION AND MANAGEMENT (greater than 50% of the time spent for supportive psychotherapy).      /89   Pulse 69   Ht 172.7 cm (67.99\")   Wt 104 kg (230 lb 3.2 oz)   BMI 35.01 kg/m²     ALLERGIES:  Patient has no known allergies.    CC/ Focus of the visit: Depression    HPI: Patient reports that he has done well without recurrent episode of depression since his last clinic appointment.  Patient's activity, sleep, relationships and temperament all at irrelative norms.  He remains involved in the high school support program with his son the  of baseball.  Encouraged him to sustain a regular daily exercise program.  Wife is with the patient today as always and concurs with his report.  Discussed tapering medications slowly although the risk of recurrent episode of depression relatively significant.  Patient has a 90-day supply of the aripiprazole which he will continue till exhausted, at that point will discontinue if continuing to do well.  Patient to continue the sertraline and bupropion as is.    PFSH: At home with his wife who continues to be very supportive, planning [place] with family.    Review of Systems  No cardiopulmonary, GI or neurological complaints.    SUPPORTIVE PSYCHOTHERAPY: continuing efforts to promote the therapeutic alliance, address the patient’s issues, and strengthen self awareness, insights, and positive coping skills such as Exercising, listen to music, spending time in nature and utilizing resources.     Mental Status Exam  Appearance:  appropriate  Attitude toward clinician:  cooperative and agreeable   Speech:    Rate:  regular rate and rhythm   Volume: normal  Motor:  no abnormal movements   Mood:  Good  Affect:  euthymic  Thought Processes:  linear, logical, and goal directed  Thought Content:  Normal   Feeling Hopeless: absent  Suicidal Thoughts or Intent:  absent  Homicidal Thoughts:  absent  Perceptual " Disturbance: no perceptual disturbance  Attention and Concentration:  good  Insight and Judgement:  good  Memory:  memory appears to be intact    LABS: No results found for this or any previous visit (from the past 168 hour(s)).    MEDICATION ISSUES: Have discussed with the patient the medications Risks, Benefits, and Side effects including potential falls, possible impaired driving and  metabolic adversities among others. No medication side effects or related complaints today.     TREATMENT PLAN/GOALS: Continue supportive psychotherapy efforts and medications as indicated.     Current Outpatient Medications   Medication Sig Dispense Refill   • amLODIPine (NORVASC) 10 MG tablet Take 1 tablet by mouth Daily. 30 tablet 0   • ARIPiprazole (ABILIFY) 2 MG tablet TAKE 1 TABLET BY MOUTH ONCE A DAY 90 tablet 0   • bisoprolol (ZEBeta) 5 MG tablet      • buPROPion XL (WELLBUTRIN XL) 300 MG 24 hr tablet Take 1 tablet by mouth Daily. 90 tablet 0   • ergocalciferol (ERGOCALCIFEROL) 1.25 MG (20589 UT) capsule ergocalciferol (vitamin D2) 1,250 mcg (50,000 unit) capsule     • flecainide (TAMBOCOR) 100 MG tablet      • melatonin 3 MG tablet Take 1 tablet by mouth At Night As Needed for Sleep. Indications: Depression 30 tablet 0   • pantoprazole (PROTONIX) 40 MG EC tablet      • rivaroxaban (XARELTO) 20 MG tablet Xarelto 20 mg tablet     • rosuvastatin (CRESTOR) 20 MG tablet rosuvastatin 20 mg tablet     • sertraline (ZOLOFT) 100 MG tablet One and 1/2 nightly 135 tablet 0   • albuterol sulfate  (90 Base) MCG/ACT inhaler Inhale 2 puffs 3 (Three) Times a Day.     • aspirin 81 MG EC tablet Take 81 mg by mouth Daily. Indications: hypertension     • atorvastatin (LIPITOR) 80 MG tablet      • hydroCHLOROthiazide (HYDRODIURIL) 25 MG tablet        No current facility-administered medications for this visit.       COLLATERAL PSYCHOTHERAPEUTIC INTERVENTION:  patient not interested in additional psychotherapy.    RISK:   significant    Assessment & Plan     Diagnoses and all orders for this visit:    1. Recurrent major depressive disorder, in remission (HCC) (Primary)  -     buPROPion XL (WELLBUTRIN XL) 300 MG 24 hr tablet; Take 1 tablet by mouth Daily.  Dispense: 90 tablet; Refill: 0  -     sertraline (ZOLOFT) 100 MG tablet; One and 1/2 nightly  Dispense: 135 tablet; Refill: 0    Plus the aripiprazole 2 mg daily for the next 90 days.    Return in about 5 months (around 5/6/2023).           Patient knows to call if symptoms worsen or fail to improve between appointments.    I spent 30 minutes caring for Ernesto on this date of service. This time includes time spent by me in the following activities: Patient evaluation, support psychotherapy, decisions, medications, and documentation.     Dictated utilizing [employer] dictation    JEAN PAUL De Leon MD

## 2023-02-01 ENCOUNTER — OFFICE VISIT (OUTPATIENT)
Dept: URBAN - METROPOLITAN AREA CLINIC 71 | Facility: CLINIC | Age: 73
End: 2023-02-01
Payer: COMMERCIAL

## 2023-02-01 DIAGNOSIS — H02.831 DERMATOCHALASIS OF RIGHT UPPER EYELID: ICD-10-CM

## 2023-02-01 DIAGNOSIS — R42 VERTIGO: Primary | ICD-10-CM

## 2023-02-01 DIAGNOSIS — H04.123 DRY EYE SYNDROME OF BILATERAL LACRIMAL GLANDS: ICD-10-CM

## 2023-02-01 DIAGNOSIS — H53.002 UNSPECIFIED AMBLYOPIA, LEFT EYE: ICD-10-CM

## 2023-02-01 DIAGNOSIS — H02.834 DERMATOCHALASIS OF LEFT UPPER EYELID: ICD-10-CM

## 2023-02-01 PROCEDURE — 99212 OFFICE O/P EST SF 10 MIN: CPT

## 2023-02-01 ASSESSMENT — INTRAOCULAR PRESSURE
OS: 11
OD: 13

## 2023-02-01 NOTE — IMPRESSION/PLAN
Impression: Dermatochalasis of right upper eyelid: H02.831. Plan: Discussed the nature of the diagnosis with the patient. Explained that there is excess skin that is dropping in front of the eye. Patient is denying any visual interference. Discussed that the treatment option would be to have the eyelid skin be removed surgically however treatment is not recommended at this time. Will continue to monitor. Patient expressed understanding.

## 2023-02-01 NOTE — IMPRESSION/PLAN
Impression: Unspecified amblyopia, left eye: H53.002.

 -- Hx of lazy eye Plan: Discussed diagnosis with the patient. Explained that his vision has probably been reduced for a long period of time. Patient expressed understanding.

## 2023-02-01 NOTE — IMPRESSION/PLAN
Impression: Vertigo: R42.

 -- Patient has been experiencing dizziness since he got the 2nd COVID shot and is currently under the care of neurologist Plan: Discussed diagnosis with the patient. Patient has been experiencing dizziness since he got the 2nd COVID shot and is currently under the care of a neurologist. Neurologist recommended that he get a consultation from an eye care provider to see if the dizziness could be related to the eyes. Health of the eyes came back normal and nothing would indicate a reason for the patient's dizziness. Explained that he should continue to follow up care with his neurologist and RTC if any changes happen with his eyes. Patient expressed understanding.

## 2023-03-29 ENCOUNTER — TELEPHONE (OUTPATIENT)
Dept: PSYCHIATRY | Facility: CLINIC | Age: 73
End: 2023-03-29
Payer: MEDICARE

## 2023-03-29 NOTE — TELEPHONE ENCOUNTER
Pt's wife called and said that at last visit you had mentioned that pt could take his abilify and when he was out of it he could discontinue if he was doing well. Pt's wife said that she stopped it but is wanting to make sure that he did not need to be tapered off of it. Please advise

## 2023-05-09 ENCOUNTER — OFFICE VISIT (OUTPATIENT)
Dept: PSYCHIATRY | Facility: CLINIC | Age: 73
End: 2023-05-09
Payer: MEDICARE

## 2023-05-09 VITALS
DIASTOLIC BLOOD PRESSURE: 72 MMHG | SYSTOLIC BLOOD PRESSURE: 130 MMHG | HEART RATE: 77 BPM | BODY MASS INDEX: 35.13 KG/M2 | HEIGHT: 68 IN | WEIGHT: 231.8 LBS

## 2023-05-09 DIAGNOSIS — F33.40 RECURRENT MAJOR DEPRESSIVE DISORDER, IN REMISSION: Primary | ICD-10-CM

## 2023-05-09 PROCEDURE — 3075F SYST BP GE 130 - 139MM HG: CPT | Performed by: PSYCHIATRY & NEUROLOGY

## 2023-05-09 PROCEDURE — 3078F DIAST BP <80 MM HG: CPT | Performed by: PSYCHIATRY & NEUROLOGY

## 2023-05-09 RX ORDER — BUPROPION HYDROCHLORIDE 150 MG/1
150 TABLET ORAL EVERY MORNING
Qty: 90 TABLET | Refills: 0 | Status: SHIPPED | OUTPATIENT
Start: 2023-05-09 | End: 2024-05-08

## 2023-05-09 RX ORDER — SERTRALINE HYDROCHLORIDE 100 MG/1
TABLET, FILM COATED ORAL
Qty: 138 TABLET | Refills: 0 | Status: SHIPPED | OUTPATIENT
Start: 2023-05-09

## 2023-05-09 NOTE — PROGRESS NOTES
"Patient ID: Ernesto Easley is a 72 y.o. male    SERVICE TYPE: EVALUATION AND MANAGEMENT (greater than 50% of the time spent for supportive psychotherapy).      /72   Pulse 77   Ht 172.7 cm (67.99\")   Wt 105 kg (231 lb 12.8 oz)   BMI 35.25 kg/m²     ALLERGIES:  Patient has no known allergies.    CC/ Focus of the visit: depression.     HPI: Seen with his wife as usual, both report he has done well without recurrent symptoms of depression. Sleep, interest, activities all at their norms. Cheerful cross sectionally. Tapered off the Abilify without repercussions, will start on the bupropion and several months when his current prescription lapses. Has not exercise as recommended - encouraged to do so.     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     Review of Systems  No cardiopulmonary, GI or neurological complaints.    SUPPORTIVE PSYCHOTHERAPY: continuing efforts to promote the therapeutic alliance, address the patient’s issues, and strengthen self awareness, insights, and positive coping skills such as Exercising, listen to music, spending time in nature and utilizing resources.     Mental Status Exam  Appearance:  appropriate  Attitude toward clinician:  cooperative and agreeable   Speech:    Rate:  regular rate and rhythm   Volume: normal  Motor:  no abnormal movements   Mood:  Good  Affect:  euthymic  Thought Processes:  linear, logical, and goal directed  Thought Content:  Normal   Feeling Hopeless: absent  Suicidal Thoughts or Intent:  absent  Homicidal Thoughts:  absent  Perceptual Disturbance: no perceptual disturbance  Attention and Concentration:  good  Insight and Judgement:  good  Memory:  memory appears to be intact    LABS: No results found for this or any previous visit (from the past 168 hour(s)).    MEDICATION ISSUES: Have discussed with the patient the medications Risks, Benefits, and Side effects including potential " falls, possible impaired driving and  metabolic adversities among others. No medication side effects or related complaints today.     TREATMENT PLAN/GOALS: Continue supportive psychotherapy efforts and medications as indicated.     Current Outpatient Medications   Medication Sig Dispense Refill   • amLODIPine (NORVASC) 10 MG tablet Take 1 tablet by mouth Daily. 30 tablet 0   • bisoprolol (ZEBeta) 5 MG tablet      • ergocalciferol (ERGOCALCIFEROL) 1.25 MG (04059 UT) capsule ergocalciferol (vitamin D2) 1,250 mcg (50,000 unit) capsule     • flecainide (TAMBOCOR) 100 MG tablet      • melatonin 3 MG tablet Take 1 tablet by mouth At Night As Needed for Sleep. Indications: Depression 30 tablet 0   • pantoprazole (PROTONIX) 40 MG EC tablet      • rivaroxaban (XARELTO) 20 MG tablet Xarelto 20 mg tablet     • rosuvastatin (CRESTOR) 20 MG tablet rosuvastatin 20 mg tablet     • sertraline (ZOLOFT) 100 MG tablet One and 1/2 nightly 138 tablet 0   • buPROPion XL (Wellbutrin XL) 150 MG 24 hr tablet Take 1 tablet by mouth Every Morning. 90 tablet 0     No current facility-administered medications for this visit.       COLLATERAL PSYCHOTHERAPEUTIC INTERVENTION:  patient not interested in additional psychotherapy.    RISK:  Significant based on severity of his prior episodes of depression.     Assessment & Plan     Diagnoses and all orders for this visit:    1. Recurrent major depressive disorder, in remission (Primary)  -     buPROPion XL (Wellbutrin XL) 150 MG 24 hr tablet; Take 1 tablet by mouth Every Morning.  Dispense: 90 tablet; Refill: 0  -     sertraline (ZOLOFT) 100 MG tablet; One and 1/2 nightly  Dispense: 138 tablet; Refill: 0        Return in about 5 months (around 10/9/2023).           Patient knows to call if symptoms worsen or fail to improve between appointments.    I spent 30 minutes caring for Ernesto on this date of service. This time includes time spent by me in the following activities: Patient evaluation, support  psychotherapy, decisions, medications, and documentation.     Dictated utilizing [employer] dictation    JEAN PAUL De Leon MD

## 2023-08-22 NOTE — OP NOTE
ECT OPERATIVE PROCEDURE REPORT      PATIENT NAME: Ernesto Easley    Assistants: None    Primary Surgeon: JEAN PAUL De Leno MD    Preoperative Diagnosis:   Major Depression Disorder, Severe, Recurrent, with Psychotic Features    Postoperative Diagnosis: Same  : 1950    SSN:     MRN#: [id number]    PHYSICIAN: JEAN PAUL DE LEON M.D.    PROCEDURE DATE: 21    PROCEDURE: Bifrontal ECT utilizing Spectrum 5000 q. Machine. Settings at 0.37 ms, 90Hz, 2.25 seconds, and 800 mA. This is the patient's 5 Tx in this series.    ANESTHESIA: See anesthesia report for medications and monitoring.                           Brevital:100        mg          Succinylcholine: 100        mg    DETAILS: Impulse at 0833 with a resultant 22 second seizure, repeat impulse at 0836 with 30 second seizure. .    Specimens Removed: NA  Blood Administered: NA  Estimated Blood Loss: None  Complications: None    Pt STATUS STABLE: 900  HR    Grafts or Implants: NA    Dictated utilizing Dragon dictation  
ECT OPERATIVE PROCEDURE REPORT      PATIENT NAME: Ernesto Easley    Assistants: None    Primary Surgeon: JEAN PAUL De Leon MD    Preoperative Diagnosis:   Major Depression Disorder, Severe, Recurrent, with Psychotic Features    Postoperative Diagnosis: Same  : 1950    SSN:     MRN#: [id number]    PHYSICIAN: JEAN PAUL DE LEON M.D.    PROCEDURE DATE: 03/15/21    PROCEDURE: Bifrontal ECT utilizing Spectrum 5000 q. Machine. Settings at 0.37 ms, 90Hz, 2.25 seconds, and 800 mA. This is the patient's 4 Tx in this series.    ANESTHESIA: See anesthesia report for medications and monitoring.                           Brevital:100        mg          Succinylcholine: 100        mg    DETAILS: Impulse at 0840 with a resultant 20 second seizure.  Repeated impulse at 844 with resultant 25-second seizure.    Specimens Removed: NA  Blood Administered: NA  Estimated Blood Loss: None  Complications: None    Pt STATUS STABLE: 900 HR    Grafts or Implants: NA    Dictated utilizing Dragon dictation  
ECT OPERATIVE PROCEDURE REPORT      PATIENT NAME: Ernesto Easley    Assistants: None    Primary Surgeon: JEAN PAUL De Leon MD    Preoperative Diagnosis:   Major Depression Disorder, Severe, Recurrent, with Psychotic Features    Postoperative Diagnosis: Same  : 1950    SSN:     MRN#: [id number]    PHYSICIAN: JEAN PAUL DE LEON M.D.    PROCEDURE DATE: 21    PROCEDURE: Bifrontal ECT utilizing Spectrum 5000 q. Machine. Settings at 0.37 ms, 90Hz, 2.25 seconds, and 800 mA. This is the patient's 6 Tx in this series.    ANESTHESIA: See anesthesia report for medications and monitoring.                           Brevital:100        mg          Succinylcholine: 100        mg    DETAILS: Impulse at 0848 with a resultant 37 second seizure.    Specimens Removed: NA  Blood Administered: NA  Estimated Blood Loss: None  Complications: None    Pt STATUS STABLE: 0900  HR    Grafts or Implants: NA    Dictated utilizing Dragon dictation  
ECT OPERATIVE PROCEDURE REPORT      PATIENT NAME: Ernesto Easley    Assistants: None    Primary Surgeon: JEAN PAUL De Leon MD    Preoperative Diagnosis:   Major Depression Disorder, Severe, Recurrent, with Psychotic Features    Postoperative Diagnosis: Same  : 1950    SSN:     MRN#: [id number]    PHYSICIAN: JEAN PAUL DE LEON M.D.    PROCEDURE DATE: 03/10/21    PROCEDURE: Bifrontal ECT utilizing Spectrum 5000 q. Machine. Settings at 0.37 ms, 90 hz, 2.25 seconds, and 800 mA. This is the patient's second tx in this series.    ANESTHESIA: See anesthesia report for medications and monitoring.                           Brevital: 100        mg          Succinylcholine: 100        mg    DETAILS: Impulse at 832 with no seizure resulted.  Repeated impulse at 836 with resulting 27-second seizure.    Specimens Removed: NA  Blood Administered: NA  Estimated Blood Loss: None  Complications: None    Pt STATUS STABLE: 0900 HR    Grafts or Implants: NA    Dictated utilizing Dragon dictation  
ECT OPERATIVE PROCEDURE REPORT      PATIENT NAME: Ernesto Easley    Assistants: None    Primary Surgeon: JEAN PAUL De Leon MD    Preoperative Diagnosis:   Major Depression, Recurrent, Severe Without Psychosis    Postoperative Diagnosis: Same  : 1950    SSN:     MRN#: [id number]    PHYSICIAN: JEAN PAUL DE LEON M.D.    PROCEDURE DATE: 21    PROCEDURE: Bifrontal ECT utilizing Spectrum 5000 q. Machine. Settings at 0.37 ms, 90Hz, 2.0 seconds, and 800 mA. This is the patient's first Tx in this series.    ANESTHESIA: See anesthesia report for medications and monitoring.                           Brevital:100        mg          Succinylcholine: 100        mg    DETAILS: Impulse at 0824 with a no seizure, repeat impulse at 0827 (2.25 seconds) with 25 second seizure. .    Specimens Removed: NA  Blood Administered: NA  Estimated Blood Loss: None  Complications: None    Pt STATUS STABLE: 0900  HR    Grafts or Implants: NA    Dictated utilizing Dragon dictation  
ECT OPERATIVE PROCEDURE REPORT      PATIENT NAME: Ernesto Easley    Assistants: None    Primary Surgeon: JEAN PAUL De Leon MD    Preoperative Diagnosis:   Major Depression, Single Episode, Severe Without Psychosis    Postoperative Diagnosis: Same  : 1950    SSN:     MRN#: [id number]    PHYSICIAN: JEAN PAUL DE LEON M.D.    PROCEDURE DATE: 21    PROCEDURE: Bifrontal ECT utilizing Spectrum 5000 q. Machine. Settings at 0.37 ms, 90Hz, 2.25 seconds, and 800 mA. This is the patient's 3 Tx in this series.    ANESTHESIA: See anesthesia report for medications and monitoring.                           Brevital:100        mg          Succinylcholine: 100        mg    DETAILS: Impulse at 952 with with questionable seizure lasting 33 seconds, impulse repeated at 956 with a resultant 32nd seizure.    Specimens Removed: NA  Blood Administered: NA  Estimated Blood Loss: None  Complications: None    Pt STATUS STABLE: 10 15 HR    Grafts or Implants: NA    Dictated utilizing Dragon dictation  
Diet, Pureed:   DASH/TLC {Sodium & Cholesterol Restricted} (DASH) (08-20-23 @ 12:59) [Active]

## 2023-10-02 DIAGNOSIS — F33.40 RECURRENT MAJOR DEPRESSIVE DISORDER, IN REMISSION: ICD-10-CM

## 2023-10-02 RX ORDER — BUPROPION HYDROCHLORIDE 150 MG/1
150 TABLET ORAL EVERY MORNING
Qty: 90 TABLET | Refills: 0 | Status: SHIPPED | OUTPATIENT
Start: 2023-10-02 | End: 2024-10-01

## 2023-10-25 DIAGNOSIS — F33.40 RECURRENT MAJOR DEPRESSIVE DISORDER, IN REMISSION: ICD-10-CM

## 2023-10-26 RX ORDER — SERTRALINE HYDROCHLORIDE 100 MG/1
TABLET, FILM COATED ORAL
Qty: 138 TABLET | Refills: 0 | Status: SHIPPED | OUTPATIENT
Start: 2023-10-26

## 2023-11-02 ENCOUNTER — OFFICE VISIT (OUTPATIENT)
Dept: PSYCHIATRY | Facility: CLINIC | Age: 73
End: 2023-11-02
Payer: MEDICARE

## 2023-11-02 VITALS
WEIGHT: 226.2 LBS | BODY MASS INDEX: 34.28 KG/M2 | DIASTOLIC BLOOD PRESSURE: 70 MMHG | OXYGEN SATURATION: 97 % | HEART RATE: 59 BPM | HEIGHT: 68 IN | SYSTOLIC BLOOD PRESSURE: 128 MMHG

## 2023-11-02 DIAGNOSIS — F33.40 RECURRENT MAJOR DEPRESSIVE DISORDER, IN REMISSION: Primary | ICD-10-CM

## 2023-11-02 PROCEDURE — 99214 OFFICE O/P EST MOD 30 MIN: CPT | Performed by: PSYCHIATRY & NEUROLOGY

## 2023-11-02 PROCEDURE — 3074F SYST BP LT 130 MM HG: CPT | Performed by: PSYCHIATRY & NEUROLOGY

## 2023-11-02 PROCEDURE — 3078F DIAST BP <80 MM HG: CPT | Performed by: PSYCHIATRY & NEUROLOGY

## 2023-11-02 RX ORDER — SERTRALINE HYDROCHLORIDE 100 MG/1
TABLET, FILM COATED ORAL
Qty: 138 TABLET | Refills: 1 | Status: SHIPPED | OUTPATIENT
Start: 2023-11-02

## 2023-11-02 RX ORDER — BUPROPION HYDROCHLORIDE 150 MG/1
150 TABLET ORAL EVERY MORNING
Qty: 90 TABLET | Refills: 0 | Status: SHIPPED | OUTPATIENT
Start: 2023-11-02 | End: 2024-11-01

## 2023-11-02 RX ORDER — BUPROPION HYDROCHLORIDE 150 MG/1
150 TABLET ORAL EVERY MORNING
Qty: 90 TABLET | Refills: 0 | Status: SHIPPED | OUTPATIENT
Start: 2023-11-02 | End: 2023-11-02 | Stop reason: SDUPTHER

## 2023-11-02 NOTE — PROGRESS NOTES
"Patient ID: Ernesto Easley is a 73 y.o. male    SERVICE TYPE: EVALUATION AND MANAGEMENT (greater than 50% of the time spent for supportive psychotherapy).      /70   Pulse 59   Ht 172.7 cm (67.99\")   Wt 103 kg (226 lb 3.2 oz)   SpO2 97%   BMI 34.40 kg/m² weight down 5 pounds since last contact.    ALLERGIES:  Patient has no known allergies.    CC/ Focus of the visit: depression.     HPI: Patient reports that he has done well in the interim between appointments-interest activities sleep appetite all at their norms.  Enjoying the high school football program.  Seen with his wife who verifies his report.  Patient to continue the bupropion 150 mg daily until 1 April and is doing well then stop while continuing the sertraline 150 mg daily.    PFSH: Home environment supportive and stable.  No change of lifestyle.    Review of Systems  No cardiopulmonary, GI or neurological complaints.    SUPPORTIVE PSYCHOTHERAPY: continuing efforts to promote the therapeutic alliance, address the patient’s issues, and strengthen self awareness, insights, and positive coping skills such as Exercising, listen to music, spending time in nature and utilizing resources.     Mental Status Exam  Appearance:  appropriate  Attitude toward clinician:  cooperative and agreeable   Speech:    Rate:  regular rate and rhythm   Volume: normal  Motor:  no abnormal movements   Mood:  Good  Affect:  euthymic  Thought Processes:  linear, logical, and goal directed  Thought Content:  Normal   Feeling Hopeless: absent  Suicidal Thoughts or Intent:  absent  Homicidal Thoughts:  absent  Perceptual Disturbance: no perceptual disturbance  Attention and Concentration:  good  Insight and Judgement:  good  Memory:  memory appears to be intact    LABS: No results found for this or any previous visit (from the past 168 hour(s)).    MEDICATION ISSUES: Have discussed with the patient the medications Risks, Benefits, and Side effects including potential falls, " possible impaired driving and  metabolic adversities among others. No medication side effects or related complaints today.     TREATMENT PLAN/GOALS: Continue supportive psychotherapy efforts and medications as indicated.     Current Outpatient Medications   Medication Sig Dispense Refill    amLODIPine (NORVASC) 10 MG tablet Take 1 tablet by mouth Daily. 30 tablet 0    bisoprolol (ZEBeta) 5 MG tablet       buPROPion XL (Wellbutrin XL) 150 MG 24 hr tablet Take 1 tablet by mouth Every Morning. 90 tablet 0    ergocalciferol (ERGOCALCIFEROL) 1.25 MG (31324 UT) capsule ergocalciferol (vitamin D2) 1,250 mcg (50,000 unit) capsule      flecainide (TAMBOCOR) 100 MG tablet       melatonin 3 MG tablet Take 1 tablet by mouth At Night As Needed for Sleep. Indications: Depression 30 tablet 0    pantoprazole (PROTONIX) 40 MG EC tablet       rivaroxaban (XARELTO) 20 MG tablet Xarelto 20 mg tablet      rosuvastatin (CRESTOR) 20 MG tablet rosuvastatin 20 mg tablet      sertraline (ZOLOFT) 100 MG tablet One and 1/2 nightly 138 tablet 1     No current facility-administered medications for this visit.       COLLATERAL PSYCHOTHERAPEUTIC INTERVENTION:  patient not interested in additional psychotherapy.    RISK:  moderate    Assessment & Plan     Diagnoses and all orders for this visit:    1. Recurrent major depressive disorder, in remission (Primary)  -     sertraline (ZOLOFT) 100 MG tablet; One and 1/2 nightly  Dispense: 138 tablet; Refill: 1     buPROPion XL (Wellbutrin XL) 150 MG 24 hr tablet; Take 1 tablet by mouth Every Morning.  Dispense: 90 tablet; Refill: 0        Return in about 4 months (around 3/2/2024).           Patient knows to call if symptoms worsen or fail to improve between appointments.    I spent 30 minutes caring for Ernesto on this date of service. This time includes time spent by me in the following activities: Patient evaluation, support psychotherapy, decisions, medications, and documentation.     Dictated utilizing  [employer]omin[employer] dictation    JEAN PAUL De Leon MD

## 2024-02-13 ENCOUNTER — OFFICE VISIT (OUTPATIENT)
Dept: PSYCHIATRY | Facility: CLINIC | Age: 74
End: 2024-02-13
Payer: MEDICARE

## 2024-02-13 VITALS
SYSTOLIC BLOOD PRESSURE: 130 MMHG | OXYGEN SATURATION: 96 % | HEART RATE: 70 BPM | WEIGHT: 228 LBS | DIASTOLIC BLOOD PRESSURE: 78 MMHG | BODY MASS INDEX: 34.56 KG/M2 | HEIGHT: 68 IN

## 2024-02-13 DIAGNOSIS — F33.40 RECURRENT MAJOR DEPRESSIVE DISORDER, IN REMISSION: ICD-10-CM

## 2024-02-13 PROCEDURE — 99214 OFFICE O/P EST MOD 30 MIN: CPT | Performed by: PSYCHIATRY & NEUROLOGY

## 2024-02-13 PROCEDURE — 3075F SYST BP GE 130 - 139MM HG: CPT | Performed by: PSYCHIATRY & NEUROLOGY

## 2024-02-13 PROCEDURE — 3078F DIAST BP <80 MM HG: CPT | Performed by: PSYCHIATRY & NEUROLOGY

## 2024-02-13 RX ORDER — APIXABAN 5 MG/1
1 TABLET, FILM COATED ORAL EVERY 12 HOURS SCHEDULED
COMMUNITY
Start: 2024-01-26

## 2024-07-13 DIAGNOSIS — F33.40 RECURRENT MAJOR DEPRESSIVE DISORDER, IN REMISSION: ICD-10-CM

## 2024-07-16 RX ORDER — SERTRALINE HYDROCHLORIDE 100 MG/1
TABLET, FILM COATED ORAL
Qty: 138 TABLET | Refills: 0 | Status: SHIPPED | OUTPATIENT
Start: 2024-07-16

## 2024-08-29 ENCOUNTER — OFFICE VISIT (OUTPATIENT)
Dept: PSYCHIATRY | Facility: CLINIC | Age: 74
End: 2024-08-29
Payer: MEDICARE

## 2024-08-29 VITALS
DIASTOLIC BLOOD PRESSURE: 70 MMHG | OXYGEN SATURATION: 96 % | HEIGHT: 68 IN | BODY MASS INDEX: 35.43 KG/M2 | SYSTOLIC BLOOD PRESSURE: 122 MMHG | HEART RATE: 71 BPM | WEIGHT: 233.8 LBS

## 2024-08-29 DIAGNOSIS — F33.40 RECURRENT MAJOR DEPRESSIVE DISORDER, IN REMISSION: Primary | ICD-10-CM

## 2024-08-29 PROCEDURE — 3074F SYST BP LT 130 MM HG: CPT | Performed by: PSYCHIATRY & NEUROLOGY

## 2024-08-29 PROCEDURE — 3078F DIAST BP <80 MM HG: CPT | Performed by: PSYCHIATRY & NEUROLOGY

## 2024-08-29 RX ORDER — SERTRALINE HYDROCHLORIDE 100 MG/1
TABLET, FILM COATED ORAL
Qty: 90 TABLET | Refills: 1 | Status: SHIPPED | OUTPATIENT
Start: 2024-08-29

## 2024-08-29 NOTE — PROGRESS NOTES
"Patient ID: Ernesto Easley is a 74 y.o. male    SERVICE TYPE: EVALUATION AND MANAGEMENT (greater than 50% of the time spent for supportive psychotherapy).      /70   Pulse 71   Ht 172.7 cm (67.99\")   Wt 106 kg (233 lb 12.8 oz)   SpO2 96%   BMI 35.56 kg/m²     ALLERGIES:  Patient has no known allergies.    CC/ Focus of the visit: depression    HPI: Patient seen with his wife again, both report that he has done very well in the interim between visits and has not experienced a recurrent episode of clinical depression.  He is actively interested in the high school sports program where his son is a .  The utilization of the CPAP for his sleep apnea has had a major impact on his wellbeing.    Discussed a slow taper off the sertraline as an option.    PFSH: Wife continues to be very supportive, his home life is peaceful and rewarding.    Review of Systems  No cardiopulmonary, GI or neurological complaints.    SUPPORTIVE PSYCHOTHERAPY: continuing efforts to promote the therapeutic alliance, address the patient’s issues, and strengthen self awareness, insights, and positive coping skills such as Exercising, listen to music, spending time in nature and utilizing resources.     Mental Status Exam  Appearance:  appropriate  Attitude toward clinician:  cooperative and agreeable   Speech:    Rate:  regular rate and rhythm   Volume: normal  Motor:  no abnormal movements   Mood:  Good  Affect:  euthymic  Thought Processes:  linear, logical, and goal directed  Thought Content:  Normal   Feeling Hopeless: absent  Suicidal Thoughts or Intent:  absent  Homicidal Thoughts:  absent  Perceptual Disturbance: no perceptual disturbance  Attention and Concentration:  good  Insight and Judgement:  good  Memory:  memory appears to be intact    LABS: No results found for this or any previous visit (from the past 168 hour(s)).    MEDICATION ISSUES: Have discussed with the patient the medications Risks, Benefits, and Side effects " including potential falls, possible impaired driving and  metabolic adversities among others. No medication side effects or related complaints today.     TREATMENT PLAN/GOALS: Continue supportive psychotherapy efforts and medications as indicated.     Current Outpatient Medications   Medication Sig Dispense Refill    amLODIPine (NORVASC) 10 MG tablet Take 1 tablet by mouth Daily. 30 tablet 0    bisoprolol (ZEBeta) 5 MG tablet       Eliquis 5 MG tablet tablet Take 1 tablet by mouth Every 12 (Twelve) Hours.      ergocalciferol (ERGOCALCIFEROL) 1.25 MG (55850 UT) capsule ergocalciferol (vitamin D2) 1,250 mcg (50,000 unit) capsule      flecainide (TAMBOCOR) 100 MG tablet       melatonin 3 MG tablet Take 1 tablet by mouth At Night As Needed for Sleep. Indications: Depression 30 tablet 0    rosuvastatin (CRESTOR) 20 MG tablet rosuvastatin 20 mg tablet      sertraline (ZOLOFT) 100 MG tablet TAKE 1 daily at bedtime. 90 tablet 1    pantoprazole (PROTONIX) 40 MG EC tablet  (Patient not taking: Reported on 8/29/2024)       No current facility-administered medications for this visit.       COLLATERAL PSYCHOTHERAPEUTIC INTERVENTION: patient not interested in additional psychotherapy.    RISK:  moderate    Assessment & Plan     Diagnoses and all orders for this visit:    1. Recurrent major depressive disorder, in remission (Primary)  -     sertraline (ZOLOFT) 100 MG tablet; TAKE 1 daily at bedtime.  Dispense: 90 tablet; Refill: 1        Return in about 6 months (around 2/28/2025).           Patient knows to call if symptoms worsen or fail to improve between appointments.    I spent 30 minutes caring for Ernesto on this date of service. This time includes time spent by me in the following activities: Patient evaluation, support psychotherapy, decisions, medications, and documentation.     Dictated utilizing [employer] dictation    JEAN PAUL De Leon MD

## 2024-09-16 NOTE — PLAN OF CARE
Goal Outcome Evaluation:  Plan of Care Reviewed With: patient  Progress: no change  Outcome Summary: Pt reports sleeping good and fair appetite. Rates A/D 8/8. Denies SI/HI or hallucinations. Reports feeling helpless,hopeless and worthless.   unk

## 2025-02-13 ENCOUNTER — OFFICE VISIT (OUTPATIENT)
Dept: PSYCHIATRY | Facility: CLINIC | Age: 75
End: 2025-02-13
Payer: MEDICARE

## 2025-02-13 VITALS
OXYGEN SATURATION: 92 % | WEIGHT: 233 LBS | SYSTOLIC BLOOD PRESSURE: 130 MMHG | HEIGHT: 68 IN | BODY MASS INDEX: 35.31 KG/M2 | HEART RATE: 100 BPM | DIASTOLIC BLOOD PRESSURE: 86 MMHG

## 2025-02-13 DIAGNOSIS — F33.40 RECURRENT MAJOR DEPRESSIVE DISORDER, IN REMISSION: Primary | ICD-10-CM

## 2025-02-13 PROCEDURE — 3079F DIAST BP 80-89 MM HG: CPT | Performed by: PSYCHIATRY & NEUROLOGY

## 2025-02-13 PROCEDURE — 99214 OFFICE O/P EST MOD 30 MIN: CPT | Performed by: PSYCHIATRY & NEUROLOGY

## 2025-02-13 PROCEDURE — 3075F SYST BP GE 130 - 139MM HG: CPT | Performed by: PSYCHIATRY & NEUROLOGY

## 2025-02-13 NOTE — PROGRESS NOTES
"Patient ID: Ernesto Easley is a 74 y.o. male    SERVICE TYPE: EVALUATION AND MANAGEMENT (greater than 50% of the time spent for supportive psychotherapy).      /86   Pulse 100   Ht 172.7 cm (67.99\")   Wt 106 kg (233 lb)   SpO2 92%   BMI 35.44 kg/m²     ALLERGIES:  Patient has no known allergies.    CC/ Focus of the visit: Depression    HPI: Once again patient and his wife report that he is not experienced a clinical episode of depression since our last contact in August of last year.  His interest, energy, sleep, disposition all at irrelative norms.  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  Discussed tapering his sertraline dosage to 50 mg daily from the 100 mg he has been taking.  Discussed the potential drug interaction between the SSRI and the Eliquis that he takes.  Although overweight his weight has not increased in the past 6 months.  Patient did ask about retrieving his guns from his son's home, they had been removed from the patient's home while he was so depressed.  Advised best not and that if he were again to become depressed it would be problematic.     PFSH: Home life is stable, wife continues to be very supportive.    Review of Systems  No cardiopulmonary, GI or neurological complaints.    SUPPORTIVE PSYCHOTHERAPY: continuing efforts to promote the therapeutic alliance, address the patient’s issues, and strengthen self awareness, insights, and positive coping skills such as Exercising, listen to music, spending time in nature and utilizing resources.     Mental Status Exam  Appearance:  appropriate  Attitude toward clinician:  cooperative and agreeable   Speech:    Rate:  regular rate and rhythm   Volume: normal  Motor:  no abnormal movements   Mood:  Good  Affect:  euthymic  Thought Processes:  linear, logical, and goal directed  Thought Content:  Normal   Feeling Hopeless: absent  Suicidal Thoughts or Intent:  absent  Homicidal " Thoughts:  absent  Perceptual Disturbance: no perceptual disturbance  Attention and Concentration:  good  Insight and Judgement:  good  Memory:  memory appears to be intact    LABS: No results found for this or any previous visit (from the past week).    MEDICATION ISSUES: Have discussed with the patient the medications Risks, Benefits, and Side effects including potential falls, possible impaired driving and  metabolic adversities among others. No medication side effects or related complaints today.     TREATMENT PLAN/GOALS: Continue supportive psychotherapy efforts and medications as indicated.     Current Outpatient Medications   Medication Sig Dispense Refill    amLODIPine (NORVASC) 10 MG tablet Take 1 tablet by mouth Daily. 30 tablet 0    bisoprolol (ZEBeta) 5 MG tablet       Eliquis 5 MG tablet tablet Take 1 tablet by mouth Every 12 (Twelve) Hours.      ergocalciferol (ERGOCALCIFEROL) 1.25 MG (11194 UT) capsule ergocalciferol (vitamin D2) 1,250 mcg (50,000 unit) capsule      flecainide (TAMBOCOR) 100 MG tablet       melatonin 3 MG tablet Take 1 tablet by mouth At Night As Needed for Sleep. Indications: Depression 30 tablet 0    rosuvastatin (CRESTOR) 20 MG tablet rosuvastatin 20 mg tablet      pantoprazole (PROTONIX) 40 MG EC tablet  (Patient not taking: Reported on 2/13/2025)      sertraline (Zoloft) 50 MG tablet Take 1 tablet by mouth Daily. 90 tablet 1     No current facility-administered medications for this visit.       COLLATERAL PSYCHOTHERAPEUTIC INTERVENTION: patient not interested in additional psychotherapy.    RISK:  moderate    Assessment & Plan     Diagnoses and all orders for this visit:    1. Recurrent major depressive disorder, in remission (Primary)  -     sertraline (Zoloft) 50 MG tablet; Take 1 tablet by mouth Daily.  Dispense: 90 tablet; Refill: 1        Return in about 6 months (around 8/13/2025).           Patient knows to call if symptoms worsen or fail to improve between  appointments.    I spent 30 minutes caring for Ernesto on this date of service. This time includes time spent by me in the following activities: Patient evaluation, support psychotherapy, decisions, medications, and documentation.     Dictated utilizing [email or web address] dictation    JEAN PAUL De Leon MD

## 2025-02-24 ENCOUNTER — TELEPHONE (OUTPATIENT)
Age: 75
End: 2025-02-24
Payer: MEDICARE

## 2025-02-24 DIAGNOSIS — I20.9 ANGINA PECTORIS: Primary | ICD-10-CM

## 2025-03-21 ENCOUNTER — HOSPITAL ENCOUNTER (OUTPATIENT)
Facility: HOSPITAL | Age: 75
Discharge: HOME | End: 2025-03-21
Payer: MEDICARE

## 2025-03-21 VITALS
OXYGEN SATURATION: 95 % | HEIGHT: 68 IN | DIASTOLIC BLOOD PRESSURE: 84 MMHG | WEIGHT: 233 LBS | BODY MASS INDEX: 35.31 KG/M2 | HEART RATE: 61 BPM | SYSTOLIC BLOOD PRESSURE: 139 MMHG

## 2025-03-21 DIAGNOSIS — I20.9 ANGINA PECTORIS: ICD-10-CM

## 2025-03-21 PROCEDURE — 25010000002 REGADENOSON 0.4 MG/5ML SOLUTION

## 2025-03-21 PROCEDURE — A9500 TC99M SESTAMIBI: HCPCS

## 2025-03-21 PROCEDURE — 78452 HT MUSCLE IMAGE SPECT MULT: CPT

## 2025-03-21 PROCEDURE — 34310000005 TECHNETIUM SESTAMIBI

## 2025-03-21 PROCEDURE — 93017 CV STRESS TEST TRACING ONLY: CPT

## 2025-03-21 RX ORDER — REGADENOSON 0.08 MG/ML
0.4 INJECTION, SOLUTION INTRAVENOUS ONCE
Status: COMPLETED | OUTPATIENT
Start: 2025-03-21 | End: 2025-03-21

## 2025-03-21 RX ADMIN — TECHNETIUM TC 99M SESTAMIBI 1 DOSE: 1 INJECTION INTRAVENOUS at 12:25

## 2025-03-21 RX ADMIN — REGADENOSON 0.4 MG: 0.08 INJECTION INTRAVENOUS at 13:41

## 2025-03-21 RX ADMIN — TECHNETIUM TC 99M SESTAMIBI 1 DOSE: 1 INJECTION INTRAVENOUS at 13:45

## 2025-03-24 LAB
BH CV REST NUCLEAR ISOTOPE DOSE: 9.9 MCI
BH CV STRESS BP STAGE 2: NORMAL
BH CV STRESS BP STAGE 4: NORMAL
BH CV STRESS COMMENTS STAGE 1: NORMAL
BH CV STRESS DOSE REGADENOSON STAGE 1: 0.4
BH CV STRESS DURATION MIN STAGE 1: 1
BH CV STRESS DURATION MIN STAGE 2: 1
BH CV STRESS DURATION MIN STAGE 3: 1
BH CV STRESS DURATION MIN STAGE 4: 1
BH CV STRESS HR STAGE 1: 62
BH CV STRESS HR STAGE 2: 60
BH CV STRESS HR STAGE 3: 62
BH CV STRESS HR STAGE 4: 63
BH CV STRESS NUCLEAR ISOTOPE DOSE: 33 MCI
BH CV STRESS O2 STAGE 1: 94
BH CV STRESS O2 STAGE 2: 96
BH CV STRESS O2 STAGE 3: 96
BH CV STRESS O2 STAGE 4: 96
BH CV STRESS PROTOCOL 1: NORMAL
BH CV STRESS RECOVERY BP: NORMAL MMHG
BH CV STRESS RECOVERY HR: 63 BPM
BH CV STRESS RECOVERY O2: 96 %
BH CV STRESS STAGE 1: 1
BH CV STRESS STAGE 2: 2
BH CV STRESS STAGE 3: 3
BH CV STRESS STAGE 4: 4
MAXIMAL PREDICTED HEART RATE: 148 BPM
PERCENT MAX PREDICTED HR: 42.57 %
SPECT HRT GATED+EF W RNC IV: 78 %
STRESS BASELINE BP: NORMAL MMHG
STRESS BASELINE HR: 62 BPM
STRESS O2 SAT REST: 96 %
STRESS PERCENT HR: 50 %
STRESS POST EXERCISE DUR MIN: 4 MIN
STRESS POST EXERCISE DUR SEC: 0 SEC
STRESS POST O2 SAT PEAK: 96 %
STRESS POST PEAK BP: NORMAL MMHG
STRESS POST PEAK HR: 63 BPM
STRESS TARGET HR: 126 BPM

## 2025-04-27 PROBLEM — Z95.0 PRESENCE OF PERMANENT CARDIAC PACEMAKER: Status: ACTIVE | Noted: 2025-04-27

## 2025-04-27 PROBLEM — E78.5 HYPERLIPIDEMIA LDL GOAL <70: Status: ACTIVE | Noted: 2025-04-27

## 2025-04-27 PROBLEM — I10 BENIGN ESSENTIAL HYPERTENSION: Status: ACTIVE | Noted: 2025-04-27

## 2025-04-27 PROBLEM — I48.0 PAROXYSMAL ATRIAL FIBRILLATION: Status: ACTIVE | Noted: 2025-04-27

## 2025-04-27 NOTE — ASSESSMENT & PLAN NOTE
The patient has permanent pacemaker last in office check was on 11/4/2024 is at DDDR 62 with 4.3 to 4.7 years left.  Orders:    Adult Transthoracic Echo Complete W/ Cont if Necessary Per Protocol; Future    CBC & Differential; Future    Basic Metabolic Panel; Future    Lipid Panel; Future

## 2025-04-27 NOTE — ASSESSMENT & PLAN NOTE
The patient [id number] score is 2.  He will continue Eliquis 5 mg twice a day.  The patient is also on flecainide we will need to do a nuclear Cardiolite study to rule out any old myocardial infarction or ischemic areas.  Orders:    Adult Transthoracic Echo Complete W/ Cont if Necessary Per Protocol; Future    CBC & Differential; Future    Basic Metabolic Panel; Future    Lipid Panel; Future

## 2025-04-27 NOTE — ASSESSMENT & PLAN NOTE
Hypertension is stable and controlled  Continue current treatment regimen.  Dietary sodium restriction.  Ambulatory blood pressure monitoring.  Blood pressure will be reassessed in 3 months.  Continue amlodipine 10 mg once a day with bisoprolol 5 mg once a day.  Orders:    Adult Transthoracic Echo Complete W/ Cont if Necessary Per Protocol; Future    CBC & Differential; Future    Basic Metabolic Panel; Future    Lipid Panel; Future

## 2025-04-27 NOTE — PROGRESS NOTES
Cardiology Follow-Up Note     Name: Ernesto Easley  :   1950  PCP: Kervin Prince, APRN  Date:   2025  Department: [facility] [place] [facility] CARDIOLOGY  [address]  [facility] [address]  Fax [phone]  Phone [phone]    Chief Complaint   Patient presents with    Atrial Fibrillation    Hypertension    Hyperlipidemia       Problem list:  Sick sinus syndrome status post permanent pacemaker  5/10/2021 [employer] generator model PM [id number] serial [id number]  Stress Test With Myocardial Perfusion One Day (2025 12:26)   2.  Moderate mitral regurgitation  3/26/2024 echocardiogram revealing EF 60% with moderate mitral regurgitation mild tricuspid regurgitation.  Mild pulmonary hypertension  3.  Hypertension benign essential  4.  Hyperlipidemia.  5.  Paroxysmal atrial fibrillation  6.  Obstructive sleep apnea          Subjective     History of Present Illness  Ernesto Easley is a 74 y.o. male who presents today for follow-up on chronic conditions. Patient states feeling , no chest pain or shortness of breath, staying active.          Past Medical History:   Diagnosis Date    A-fib     diagnosed last week. started on xarelto.     Anxiety     Cataract     Cataract     Left eye    Depression     Erectile dysfunction     Helicobacter pylori (H. pylori) infection     Hernia of abdominal cavity     HTN (hypertension)     Hyperlipidemia LDL goal <70 2025    Mitral regurgitation     Palpitations     Pyloric ulcer     Sick sinus syndrome     Sleep apnea     Tricuspid regurgitation       Past Surgical History:   Procedure Laterality Date    ELECTROCONVULSIVE THERAPY Bilateral 2018    Procedure: ELECTROCONVULSIVE THERAPY;  Surgeon: Thomas De Leon MD;  Location: [facility] OR;  Service: ECT    ELECTROCONVULSIVE THERAPY N/A 06/15/2018    Procedure: ELECTROCONVULSIVE THERAPY, 60 seconds;  Surgeon: Thomas De Leon MD;  Location: [facility]  OR;  Service: ECT    ELECTROCONVULSIVE THERAPY N/A 06/19/2018    Procedure: ELECTROCONVULSIVE THERAPY; 80 seconds;  Surgeon: Thomas De Leon MD;  [facility]:  [facility];  Service: ECT    ELECTROCONVULSIVE THERAPY Bilateral 03/08/2021    Procedure: BIFRONTAL ELECTROCONVULSIVE THERAPY;  Surgeon: Thomas De Leon MD;  Location:  [facility];  Service: ECT;  Laterality: Bilateral;  #1-0  #2-27    ELECTROCONVULSIVE THERAPY N/A 03/10/2021    Procedure: ELECTROCONVULSIVE THERAPY;  Surgeon: Thomas De Leon MD;  [facility]:  [facility];  Service: ECT;  Laterality: N/A;    ELECTROCONVULSIVE THERAPY N/A 03/12/2021    Procedure: ELECTROCONVULSIVE THERAPY;  Surgeon: Thomas De Leon MD;  [facility];  Service: ECT;  Laterality: N/A;    ELECTROCONVULSIVE THERAPY Bilateral 03/15/2021    Procedure: ELECTROCONVULSIVE THERAPY;  Surgeon: Thomas De Leon MD;  [facility];  Service: ECT;  Laterality: Bilateral;    ELECTROCONVULSIVE THERAPY Bilateral 03/17/2021    Procedure: ELECTROCONVULSIVE THERAPY;  Surgeon: Thomas De Loen MD;  [facility];  Service: ECT;  Laterality: Bilateral;    ELECTROCONVULSIVE THERAPY N/A 03/18/2021    Procedure: ELECTROCONVULSIVE THERAPY;  Surgeon: Thomas De Leon MD;  [facility];  Service: ECT;  Laterality: N/A;    ENDOSCOPY      EYE SURGERY      PACEMAKER IMPLANTATION  05/2021    [facility]    TOE NAIL AVULSION         Current Outpatient Medications:     amLODIPine (NORVASC) 10 MG tablet, Take 1 tablet by mouth Daily., Disp: 30 tablet, Rfl: 0    bisoprolol (ZEBeta) 10 MG tablet, Take 1 tablet by mouth Daily., Disp: , Rfl:     Eliquis 5 MG tablet tablet, Take 1 tablet by mouth Every 12 (Twelve) Hours., Disp: , Rfl:     ergocalciferol (ERGOCALCIFEROL) 1.25 MG (54625 UT) capsule, ergocalciferol (vitamin D2) 1,250 mcg (50,000 unit) capsule, Disp: , Rfl:     flecainide (TAMBOCOR) 100 MG tablet, , Disp: , Rfl:     " melatonin 3 MG tablet, Take 1 tablet by mouth At Night As Needed for Sleep. Indications: Depression, Disp: 30 tablet, Rfl: 0    polyethyl glycol-propyl glycol (SYSTANE) 0.4-0.3 % solution ophthalmic solution (artificial tears), Administer 2 drops to both eyes 2 (Two) Times a Day., Disp: , Rfl:     rosuvastatin (CRESTOR) 20 MG tablet, rosuvastatin 20 mg tablet (Patient taking differently: Take 1 tablet by mouth Every Night.), Disp: , Rfl:     sertraline (ZOLOFT) 100 MG tablet, Take 0.5 tablets by mouth Daily., Disp: , Rfl:     bisoprolol (ZEBeta) 5 MG tablet, , Disp: , Rfl:     pantoprazole (PROTONIX) 40 MG EC tablet, , Disp: , Rfl:     sertraline (Zoloft) 50 MG tablet, Take 1 tablet by mouth Daily. (Patient not taking: Reported on 4/28/2025), Disp: 90 tablet, Rfl: 1    Objective     Vital Signs:  /81 (BP Location: Left arm, Patient Position: Sitting)   Pulse 62   Ht 172.7 cm (68\")   Wt 106 kg (233 lb 4.8 oz)   BMI 35.47 kg/m²   Estimated body mass index is 35.47 kg/m² as calculated from the following:    Height as of this encounter: 172.7 cm (68\").    Weight as of this encounter: 106 kg (233 lb 4.8 oz).       Class 2 Severe Obesity (BMI >=35 and <=39.9). Obesity-related health conditions include the following: obstructive sleep apnea, hypertension, and impaired fasting glucose. Obesity is improving with lifestyle modifications. BMI is is above average; BMI management plan is completed. We discussed portion control and increasing exercise.      Vitals reviewed.   Constitutional:       Appearance: Normal and healthy appearance.   Eyes:      Pupils: Pupils are equal, round, and reactive to light.   Pulmonary:      Effort: Pulmonary effort is normal.   Chest:      Chest wall: Not tender to palpatation.   Cardiovascular:      PMI at left midclavicular line. Normal rate. Regular rhythm.      No gallop.    Pulses:     Intact distal pulses.   Edema:     Peripheral edema absent.   Skin:     General: Skin is warm. " "  Psychiatric:         Behavior: Behavior is cooperative.              Data Review:   Lab Results   Component Value Date    GLUCOSE 82 03/12/2021    BUN 15 03/12/2021    CREATININE 1.17 03/12/2021    EGFRIFNONA 62 03/12/2021    BCR 12.8 03/12/2021    K 3.8 03/12/2021    CO2 27.0 03/12/2021    CALCIUM 9.4 03/12/2021    ALBUMIN 4.06 02/26/2021    AST 16 02/26/2021    ALT 19 02/26/2021     Lab Results   Component Value Date    CHOL 162 02/27/2021    TRIG 328 (H) 02/27/2021    HDL 28 (L) 02/27/2021    LDL 80 02/27/2021      Lab Results   Component Value Date    WBC 8.45 03/03/2021    RBC 4.96 03/03/2021    HGB 15.7 03/03/2021    HCT 45.8 03/03/2021    MCV 92.3 03/03/2021     03/03/2021     Lab Results   Component Value Date    TSH 3.000 03/01/2021     Lab Results   Component Value Date    HGBA1C 5.00 06/09/2018     No results found for: \"INR\", \"PROTIME\"    Labs dated 4/21/2025 LDL 40 triglyceride 106 LFTs normal    Assessment and Plan     Assessment & Plan  Benign essential hypertension  Hypertension is stable and controlled  Continue current treatment regimen.  Dietary sodium restriction.  Ambulatory blood pressure monitoring.  Blood pressure will be reassessed in 3 months.  Continue amlodipine 10 mg once a day with bisoprolol 5 mg once a day.  Orders:    Adult Transthoracic Echo Complete W/ Cont if Necessary Per Protocol; Future    CBC & Differential; Future    Basic Metabolic Panel; Future    Lipid Panel; Future    Hyperlipidemia LDL goal <70   Lipid abnormalities are stable    Plan:  Continue same medication/s without change.      Discussed medication dosage, use, side effects, and goals of treatment in detail.    Counseled patient on lifestyle modifications to help control hyperlipidemia.   Advised patient to exercise for 150 minutes weekly. (30 minute brisk walk, 5 days a week for example)    Patient Treatment Goals:   LDL goal is less than 55    Followup in 3 months.  Continue rosuvastatin 20 mg once a day " goals discussed in detail.  Orders:    Adult Transthoracic Echo Complete W/ Cont if Necessary Per Protocol; Future    CBC & Differential; Future    Basic Metabolic Panel; Future    Lipid Panel; Future    Presence of permanent cardiac pacemaker  The patient has permanent pacemaker last in office check was on 11/4/2024 is at DDDR 62 with 4.3 to 4.7 years left.  Orders:    Adult Transthoracic Echo Complete W/ Cont if Necessary Per Protocol; Future    CBC & Differential; Future    Basic Metabolic Panel; Future    Lipid Panel; Future    Paroxysmal atrial fibrillation  The patient [id number] score is 2.  He will continue Eliquis 5 mg twice a day.  The patient is also on flecainide we will need to do a nuclear Cardiolite study to rule out any old myocardial infarction or ischemic areas.  Orders:    Adult Transthoracic Echo Complete W/ Cont if Necessary Per Protocol; Future    CBC & Differential; Future    Basic Metabolic Panel; Future    Lipid Panel; Future    Nonrheumatic mitral valve regurgitation  Will do echocardiogram for evaluation of LV function.  Orders:    Adult Transthoracic Echo Complete W/ Cont if Necessary Per Protocol; Future    CBC & Differential; Future    Basic Metabolic Panel; Future    Lipid Panel; Future      Advised to continue current cardiac medications. Please notify of any issues. Discussed with the patient compliance with medical management and follow-up.     Follow Up  Return in about 3 months (around 7/28/2025).    Call if you have any significant symptoms or go to the [facility] if possible.     Cecil Givens MD, FACC,[facility].  [place] [facility]    Part of this note may be an electronic transcription/translation of spoken language to printed text using the Dragon Dictation System.

## 2025-04-27 NOTE — ASSESSMENT & PLAN NOTE
Lipid abnormalities are stable    Plan:  Continue same medication/s without change.      Discussed medication dosage, use, side effects, and goals of treatment in detail.    Counseled patient on lifestyle modifications to help control hyperlipidemia.   Advised patient to exercise for 150 minutes weekly. (30 minute brisk walk, 5 days a week for example)    Patient Treatment Goals:   LDL goal is less than 55    Followup in 3 months.  Continue rosuvastatin 20 mg once a day goals discussed in detail.  Orders:    Adult Transthoracic Echo Complete W/ Cont if Necessary Per Protocol; Future    CBC & Differential; Future    Basic Metabolic Panel; Future    Lipid Panel; Future

## 2025-04-28 ENCOUNTER — OFFICE VISIT (OUTPATIENT)
Age: 75
End: 2025-04-28
Payer: MEDICARE

## 2025-04-28 VITALS
WEIGHT: 233.3 LBS | DIASTOLIC BLOOD PRESSURE: 81 MMHG | HEART RATE: 62 BPM | BODY MASS INDEX: 35.36 KG/M2 | SYSTOLIC BLOOD PRESSURE: 142 MMHG | HEIGHT: 68 IN

## 2025-04-28 DIAGNOSIS — I10 BENIGN ESSENTIAL HYPERTENSION: Primary | ICD-10-CM

## 2025-04-28 DIAGNOSIS — I34.0 NONRHEUMATIC MITRAL VALVE REGURGITATION: ICD-10-CM

## 2025-04-28 DIAGNOSIS — E78.5 HYPERLIPIDEMIA LDL GOAL <70: ICD-10-CM

## 2025-04-28 DIAGNOSIS — I48.0 PAROXYSMAL ATRIAL FIBRILLATION: ICD-10-CM

## 2025-04-28 DIAGNOSIS — Z95.0 PRESENCE OF PERMANENT CARDIAC PACEMAKER: ICD-10-CM

## 2025-04-28 PROCEDURE — 3079F DIAST BP 80-89 MM HG: CPT | Performed by: INTERNAL MEDICINE

## 2025-04-28 PROCEDURE — 99214 OFFICE O/P EST MOD 30 MIN: CPT | Performed by: INTERNAL MEDICINE

## 2025-04-28 PROCEDURE — G2211 COMPLEX E/M VISIT ADD ON: HCPCS | Performed by: INTERNAL MEDICINE

## 2025-04-28 PROCEDURE — 3077F SYST BP >= 140 MM HG: CPT | Performed by: INTERNAL MEDICINE

## 2025-04-28 RX ORDER — BISOPROLOL FUMARATE 10 MG/1
1 TABLET, FILM COATED ORAL DAILY
COMMUNITY

## 2025-04-28 RX ORDER — SERTRALINE HYDROCHLORIDE 100 MG/1
50 TABLET, FILM COATED ORAL DAILY
COMMUNITY

## 2025-06-04 ENCOUNTER — TELEPHONE (OUTPATIENT)
Age: 75
End: 2025-06-04
Payer: MEDICARE

## 2025-06-04 NOTE — TELEPHONE ENCOUNTER
Caller: Ernesto Easley    Relationship: Self    Best call back number: [phone]    What is the best time to reach you: ANY    Who are you requesting to speak with (clinical staff, provider,  specific staff member): CLINICAL       What was the call regarding: PATIENT ASSISTANCE  PROGRAM FOR TRAVISQUIS IS MISSING SOME INFORMATION THEY NEED THE DATE IT WAS SIGNED, AND PATIENT  PLEASE ADVISE.     Is it okay if the provider responds through MyChart: NO

## 2025-06-17 ENCOUNTER — TELEPHONE (OUTPATIENT)
Age: 75
End: 2025-06-17
Payer: MEDICARE

## 2025-06-17 NOTE — TELEPHONE ENCOUNTER
Spoke to patient's wife and she spoke to patient assistance program people. She just wanted to let me know that she had already sent in all the paperwork.

## 2025-06-17 NOTE — TELEPHONE ENCOUNTER
Caller: Katarzyna Easley    Relationship: Emergency Contact    Best call back number: [phone]     What is the best time to reach you: ANY    Who are you requesting to speak with (clinical staff, provider,  specific staff member): CLINICAL: GERDA    What was the call regarding: PT WOULD LIKE TO SPEAK TO GERDA ABOUT PT'S FINANCIAL PAPERWORK FOR HIS ELIQUIS. THERE WAS SOME INFORMATION ON THE PAPER SHE DID NOT TELL HER ABOUT.

## 2025-08-06 LAB
MDC_IDC_MSMT_BATTERY_REMAINING_LONGEVITY: 45 MO
MDC_IDC_MSMT_BATTERY_REMAINING_PERCENTAGE: 47 %
MDC_IDC_MSMT_BATTERY_RRT_TRIGGER: 2.6
MDC_IDC_MSMT_BATTERY_STATUS: NORMAL
MDC_IDC_MSMT_BATTERY_VOLTAGE: 2.96
MDC_IDC_MSMT_LEADCHNL_RA_DTM: NORMAL
MDC_IDC_MSMT_LEADCHNL_RA_IMPEDANCE_VALUE: 360
MDC_IDC_MSMT_LEADCHNL_RA_PACING_THRESHOLD_AMPLITUDE: 1.12
MDC_IDC_MSMT_LEADCHNL_RA_PACING_THRESHOLD_POLARITY: NORMAL
MDC_IDC_MSMT_LEADCHNL_RA_PACING_THRESHOLD_PULSEWIDTH: 0.8
MDC_IDC_MSMT_LEADCHNL_RA_SENSING_INTR_AMPL: 3
MDC_IDC_MSMT_LEADCHNL_RV_DTM: NORMAL
MDC_IDC_MSMT_LEADCHNL_RV_IMPEDANCE_VALUE: 410
MDC_IDC_MSMT_LEADCHNL_RV_PACING_THRESHOLD_AMPLITUDE: 1.62
MDC_IDC_MSMT_LEADCHNL_RV_PACING_THRESHOLD_POLARITY: NORMAL
MDC_IDC_MSMT_LEADCHNL_RV_PACING_THRESHOLD_PULSEWIDTH: 0.5
MDC_IDC_MSMT_LEADCHNL_RV_SENSING_INTR_AMPL: 12
MDC_IDC_PG_IMPLANT_DTM: NORMAL
MDC_IDC_PG_MFG: NORMAL
MDC_IDC_PG_MODEL: NORMAL
MDC_IDC_PG_SERIAL: NORMAL
MDC_IDC_PG_TYPE: NORMAL
MDC_IDC_SESS_DTM: NORMAL
MDC_IDC_SESS_TYPE: NORMAL
MDC_IDC_SET_BRADY_AT_MODE_SWITCH_RATE: 180
MDC_IDC_SET_BRADY_LOWRATE: 60
MDC_IDC_SET_BRADY_MAX_SENSOR_RATE: 130
MDC_IDC_SET_BRADY_MAX_TRACKING_RATE: 130
MDC_IDC_SET_BRADY_MODE: NORMAL
MDC_IDC_SET_BRADY_PAV_DELAY: 250
MDC_IDC_SET_BRADY_SAV_DELAY: 250
MDC_IDC_SET_LEADCHNL_RA_PACING_AMPLITUDE: 2.12
MDC_IDC_SET_LEADCHNL_RA_PACING_POLARITY: NORMAL
MDC_IDC_SET_LEADCHNL_RA_PACING_PULSEWIDTH: 0.8
MDC_IDC_SET_LEADCHNL_RA_SENSING_POLARITY: NORMAL
MDC_IDC_SET_LEADCHNL_RA_SENSING_SENSITIVITY: 0.5
MDC_IDC_SET_LEADCHNL_RV_PACING_AMPLITUDE: 1.88
MDC_IDC_SET_LEADCHNL_RV_PACING_POLARITY: NORMAL
MDC_IDC_SET_LEADCHNL_RV_PACING_PULSEWIDTH: 0.5
MDC_IDC_SET_LEADCHNL_RV_SENSING_POLARITY: NORMAL
MDC_IDC_SET_LEADCHNL_RV_SENSING_SENSITIVITY: 2
MDC_IDC_STAT_AT_BURDEN_PERCENT: 0
MDC_IDC_STAT_BRADY_RA_PERCENT_PACED: 88
MDC_IDC_STAT_BRADY_RV_PERCENT_PACED: 1

## 2025-08-11 ENCOUNTER — OFFICE VISIT (OUTPATIENT)
Age: 75
End: 2025-08-11
Payer: MEDICARE

## 2025-08-11 ENCOUNTER — HOSPITAL ENCOUNTER (OUTPATIENT)
Facility: HOSPITAL | Age: 75
Discharge: HOME | End: 2025-08-11
Admitting: INTERNAL MEDICINE
Payer: MEDICARE

## 2025-08-11 VITALS
WEIGHT: 239 LBS | BODY MASS INDEX: 36.22 KG/M2 | SYSTOLIC BLOOD PRESSURE: 138 MMHG | HEIGHT: 68 IN | HEART RATE: 67 BPM | DIASTOLIC BLOOD PRESSURE: 73 MMHG

## 2025-08-11 VITALS — WEIGHT: 233 LBS | BODY MASS INDEX: 35.31 KG/M2 | HEIGHT: 68 IN

## 2025-08-11 DIAGNOSIS — I48.0 PAROXYSMAL ATRIAL FIBRILLATION: ICD-10-CM

## 2025-08-11 DIAGNOSIS — I34.0 NONRHEUMATIC MITRAL VALVE REGURGITATION: ICD-10-CM

## 2025-08-11 DIAGNOSIS — I48.0 PAROXYSMAL ATRIAL FIBRILLATION: Primary | ICD-10-CM

## 2025-08-11 DIAGNOSIS — I10 BENIGN ESSENTIAL HYPERTENSION: ICD-10-CM

## 2025-08-11 DIAGNOSIS — E78.5 HYPERLIPIDEMIA LDL GOAL <70: ICD-10-CM

## 2025-08-11 DIAGNOSIS — Z95.0 PRESENCE OF PERMANENT CARDIAC PACEMAKER: ICD-10-CM

## 2025-08-11 LAB
AORTIC DIMENSIONLESS INDEX: 0.66 (DI)
ASCENDING AORTA: 3.6 CM
AV MEAN PRESS GRAD SYS DOP V1V2: 4 MMHG
AV VMAX SYS DOP: 141.5 CM/SEC
BH CV ECHO MEAS - AO MAX PG: 8 MMHG
BH CV ECHO MEAS - AO ROOT DIAM: 3.3 CM
BH CV ECHO MEAS - AO V2 VTI: 30.9 CM
BH CV ECHO MEAS - AVA(I,D): 2.27 CM2
BH CV ECHO MEAS - EDV(CUBED): 91.1 ML
BH CV ECHO MEAS - EDV(MOD-SP2): 104 ML
BH CV ECHO MEAS - EDV(MOD-SP4): 115 ML
BH CV ECHO MEAS - EF(MOD-SP2): 61.3 %
BH CV ECHO MEAS - EF(MOD-SP4): 64.4 %
BH CV ECHO MEAS - ESV(CUBED): 46.7 ML
BH CV ECHO MEAS - ESV(MOD-SP2): 40.2 ML
BH CV ECHO MEAS - ESV(MOD-SP4): 40.9 ML
BH CV ECHO MEAS - FS: 20 %
BH CV ECHO MEAS - IVS/LVPW: 1 CM
BH CV ECHO MEAS - IVSD: 1 CM
BH CV ECHO MEAS - LA DIMENSION: 4 CM
BH CV ECHO MEAS - LAT PEAK E' VEL: 8.5 CM/SEC
BH CV ECHO MEAS - LV DIASTOLIC VOL/BSA (35-75): 52.9 CM2
BH CV ECHO MEAS - LV MASS(C)D: 153.3 GRAMS
BH CV ECHO MEAS - LV MAX PG: 3.2 MMHG
BH CV ECHO MEAS - LV MEAN PG: 1.5 MMHG
BH CV ECHO MEAS - LV SYSTOLIC VOL/BSA (12-30): 18.8 CM2
BH CV ECHO MEAS - LV V1 MAX: 89.4 CM/SEC
BH CV ECHO MEAS - LV V1 VTI: 20.3 CM
BH CV ECHO MEAS - LVIDD: 4.5 CM
BH CV ECHO MEAS - LVIDS: 3.6 CM
BH CV ECHO MEAS - LVOT AREA: 3.5 CM2
BH CV ECHO MEAS - LVOT DIAM: 2.1 CM
BH CV ECHO MEAS - LVPWD: 1 CM
BH CV ECHO MEAS - MED PEAK E' VEL: 8.2 CM/SEC
BH CV ECHO MEAS - MV A MAX VEL: 82.5 CM/SEC
BH CV ECHO MEAS - MV DEC SLOPE: 359 CM/SEC2
BH CV ECHO MEAS - MV DEC TIME: 0.24 SEC
BH CV ECHO MEAS - MV E MAX VEL: 85 CM/SEC
BH CV ECHO MEAS - MV E/A: 1.03
BH CV ECHO MEAS - MV MAX PG: 3.9 MMHG
BH CV ECHO MEAS - MV MEAN PG: 2 MMHG
BH CV ECHO MEAS - MV V2 VTI: 30 CM
BH CV ECHO MEAS - MVA(VTI): 2.34 CM2
BH CV ECHO MEAS - PA ACC TIME: 0.19 SEC
BH CV ECHO MEAS - PA V2 MAX: 106.5 CM/SEC
BH CV ECHO MEAS - RAP SYSTOLE: 3 MMHG
BH CV ECHO MEAS - RVSP: 33 MMHG
BH CV ECHO MEAS - SV(LVOT): 70.1 ML
BH CV ECHO MEAS - SV(MOD-SP2): 63.8 ML
BH CV ECHO MEAS - SV(MOD-SP4): 74.1 ML
BH CV ECHO MEAS - SVI(LVOT): 32.3 ML/M2
BH CV ECHO MEAS - SVI(MOD-SP2): 29.3 ML/M2
BH CV ECHO MEAS - SVI(MOD-SP4): 34.1 ML/M2
BH CV ECHO MEAS - TAPSE (>1.6): 3.3 CM
BH CV ECHO MEAS - TR MAX PG: 28.5 MMHG
BH CV ECHO MEAS - TR MAX VEL: 266.5 CM/SEC
BH CV ECHO MEASUREMENTS AVERAGE E/E' RATIO: 10.18
BH CV VAS BP LEFT ARM: NORMAL MMHG
BH CV XLRA - RV BASE: 3.9 CM
BH CV XLRA - RV LENGTH: 8.6 CM
BH CV XLRA - RV MID: 3.5 CM
BH CV XLRA - TDI S': 18.4 CM/SEC
IVRT: 123 MS
LEFT ATRIUM VOLUME INDEX: 28.9 ML/M2
LV EF 3D SEGMENTATION: 54 %
LV EF BIPLANE MOD: 62.7 %

## 2025-08-11 PROCEDURE — 99214 OFFICE O/P EST MOD 30 MIN: CPT | Performed by: INTERNAL MEDICINE

## 2025-08-11 PROCEDURE — 1159F MED LIST DOCD IN RCRD: CPT | Performed by: INTERNAL MEDICINE

## 2025-08-11 PROCEDURE — 3078F DIAST BP <80 MM HG: CPT | Performed by: INTERNAL MEDICINE

## 2025-08-11 PROCEDURE — 93000 ELECTROCARDIOGRAM COMPLETE: CPT | Performed by: INTERNAL MEDICINE

## 2025-08-11 PROCEDURE — 3075F SYST BP GE 130 - 139MM HG: CPT | Performed by: INTERNAL MEDICINE

## 2025-08-11 PROCEDURE — 93306 TTE W/DOPPLER COMPLETE: CPT

## 2025-08-11 PROCEDURE — 93306 TTE W/DOPPLER COMPLETE: CPT | Performed by: INTERNAL MEDICINE

## 2025-08-11 PROCEDURE — 1160F RVW MEDS BY RX/DR IN RCRD: CPT | Performed by: INTERNAL MEDICINE

## 2025-08-11 RX ORDER — BISOPROLOL FUMARATE 10 MG/1
10 TABLET, FILM COATED ORAL DAILY
Qty: 90 TABLET | Refills: 1 | Status: SHIPPED | OUTPATIENT
Start: 2025-08-11

## 2025-08-11 RX ORDER — FLECAINIDE ACETATE 100 MG/1
100 TABLET ORAL 2 TIMES DAILY
Qty: 180 TABLET | Refills: 1 | Status: SHIPPED | OUTPATIENT
Start: 2025-08-11

## 2025-08-11 RX ORDER — ROSUVASTATIN CALCIUM 20 MG/1
20 TABLET, COATED ORAL NIGHTLY
Qty: 90 TABLET | Refills: 1 | Status: SHIPPED | OUTPATIENT
Start: 2025-08-11

## 2025-08-11 RX ORDER — AMLODIPINE BESYLATE 10 MG/1
10 TABLET ORAL
Qty: 90 TABLET | Refills: 1 | Status: SHIPPED | OUTPATIENT
Start: 2025-08-11

## 2025-08-11 RX ORDER — APIXABAN 5 MG/1
5 TABLET, FILM COATED ORAL EVERY 12 HOURS SCHEDULED
Qty: 180 TABLET | Refills: 1 | Status: SHIPPED | OUTPATIENT
Start: 2025-08-11

## 2025-08-12 ENCOUNTER — OFFICE VISIT (OUTPATIENT)
Dept: PSYCHIATRY | Facility: CLINIC | Age: 75
End: 2025-08-12
Payer: MEDICARE

## 2025-08-12 VITALS
WEIGHT: 240.2 LBS | HEIGHT: 68 IN | HEART RATE: 60 BPM | DIASTOLIC BLOOD PRESSURE: 88 MMHG | SYSTOLIC BLOOD PRESSURE: 139 MMHG | BODY MASS INDEX: 36.4 KG/M2

## 2025-08-12 DIAGNOSIS — F33.40 RECURRENT MAJOR DEPRESSIVE DISORDER, IN REMISSION: Primary | ICD-10-CM

## 2025-08-12 PROCEDURE — 3075F SYST BP GE 130 - 139MM HG: CPT | Performed by: PSYCHIATRY & NEUROLOGY

## 2025-08-12 PROCEDURE — 99214 OFFICE O/P EST MOD 30 MIN: CPT | Performed by: PSYCHIATRY & NEUROLOGY

## 2025-08-12 PROCEDURE — 3079F DIAST BP 80-89 MM HG: CPT | Performed by: PSYCHIATRY & NEUROLOGY

## (undated) DEVICE — PCH SURG STRL INST SLF/SEAL 7.5X13IN

## (undated) DEVICE — ELECTRODE,FOAM,MONITORING,SLD GEL,5 PK: Brand: MEDLINE

## (undated) DEVICE — 133 FOAM ELECTRODES,CONDUCTIVE ADHESIVE HYDROGEL: Brand: KENDALL